# Patient Record
Sex: FEMALE | Race: BLACK OR AFRICAN AMERICAN | Employment: OTHER | URBAN - METROPOLITAN AREA
[De-identification: names, ages, dates, MRNs, and addresses within clinical notes are randomized per-mention and may not be internally consistent; named-entity substitution may affect disease eponyms.]

---

## 2017-02-02 ENCOUNTER — OFFICE VISIT (OUTPATIENT)
Dept: INTERNAL MEDICINE CLINIC | Age: 67
End: 2017-02-02

## 2017-02-02 VITALS
WEIGHT: 168 LBS | SYSTOLIC BLOOD PRESSURE: 115 MMHG | HEART RATE: 77 BPM | RESPIRATION RATE: 16 BRPM | BODY MASS INDEX: 28.68 KG/M2 | DIASTOLIC BLOOD PRESSURE: 76 MMHG | TEMPERATURE: 98.5 F | OXYGEN SATURATION: 97 % | HEIGHT: 64 IN

## 2017-02-02 DIAGNOSIS — I10 ESSENTIAL HYPERTENSION: ICD-10-CM

## 2017-02-02 DIAGNOSIS — Z00.00 MEDICARE ANNUAL WELLNESS VISIT, SUBSEQUENT: Primary | ICD-10-CM

## 2017-02-02 DIAGNOSIS — Z13.39 SCREENING FOR ALCOHOLISM: ICD-10-CM

## 2017-02-02 DIAGNOSIS — M76.31 ILIOTIBIAL BAND SYNDROME OF RIGHT SIDE: ICD-10-CM

## 2017-02-02 DIAGNOSIS — M79.604 PAIN OF RIGHT LOWER EXTREMITY: ICD-10-CM

## 2017-02-02 DIAGNOSIS — Z13.31 SCREENING FOR DEPRESSION: ICD-10-CM

## 2017-02-02 DIAGNOSIS — Z71.89 ADVANCED CARE PLANNING/COUNSELING DISCUSSION: ICD-10-CM

## 2017-02-02 DIAGNOSIS — R73.02 GLUCOSE INTOLERANCE (IMPAIRED GLUCOSE TOLERANCE): ICD-10-CM

## 2017-02-02 DIAGNOSIS — E78.00 PURE HYPERCHOLESTEROLEMIA: ICD-10-CM

## 2017-02-02 DIAGNOSIS — Z00.00 ROUTINE GENERAL MEDICAL EXAMINATION AT A HEALTH CARE FACILITY: ICD-10-CM

## 2017-02-02 NOTE — PROGRESS NOTES
Have you been to the ER or urgent care clinic since your last visit? No     Have you been hospitalized since your last visit? No     Have you been seen or consulted any other health care provider outside of [address] [place] [address] since your last visit (including pap smears, colonoscopy screening)?   No

## 2017-02-02 NOTE — PATIENT INSTRUCTIONS
Medicare Part B Preventive Services Guidelines/Limitations Date last completed and Frequency Due Date   Bone Mass Measurement  (age 72 & older, biennial) Requires diagnosis related to osteoporosis or estrogen deficiency. Biennial benefit unless patient has history of long-term glucocorticoid tx or baseline is needed because initial test was by other method Completed 11/2014    Recommended every 2 years As recommended by your PCP or Specialist     Cardiovascular Screening Blood Tests (every 5 years)  Total cholesterol, HDL, Triglycerides Order as a panel if possible Completed 8/2016    As recommended by your PCP As recommended by your PCP or Specialist   Colorectal Cancer Screening  -Fecal occult blood test (annual)  -Flexible sigmoidoscopy (5y)  -Screening colonoscopy (10y)  -Barium Enema Age 49-80; After age [de-identified] if history of abnormal results Completed 7/17/2015     Recommended follow-up in 3 years  As recommended by your PCP or Specialist     Counseling to Prevent Tobacco Use (up to 8 sessions per year)  - Counseling greater than 3 and up to 10 minutes  - Counseling greater than 10 minutes Patients must be asymptomatic of tobacco-related conditions to receive as preventive service N/A N/A   Diabetes Screening Tests (at least every 3 years, Medicare covers annually or at 6-month intervals for prediabetic patients)    Fasting blood sugar (FBS) or glucose tolerance test (GTT) Patient must be diagnosed with one of the following:  -Hypertension, Dyslipidemia, obesity, previous impaired FBS or GTT  Or any two of the following: overweight, FH of diabetes, age ? 72, history of gestational diabetes, birth of baby weighing more than 9 pounds Completed 8/2016    Recommended every 3 years for non-diabetics     As recommended by your PCP or Specialist     Glaucoma Screening (no USPSTF recommendation) Diabetes mellitus, family history, , age 48 or over,  American, age 72 or over Completed 4 years ago    Recommended annually Due now   Seasonal Influenza Vaccination (annually)  Recommended Annually Patient declined for 2016 flu season. TDAP Vaccination  Completed 7/2010    Recommended every 10 years As recommended by your PCP or Specialist   Zoster (Shingles) Vaccination Covered by Medicare Part D through the pharmacy- PCP provides prescription Never received    Recommended once over age 48  As recommended by your PCP or Specialist   Pneumococcal Vaccination (once after 72)  Pneumo 23- 1/2013  Recommended once over the age of 72    Prevnar 15-  Recommended once over the age of 72 Complete        You are due for a Prevnar 13 vaccine. You can get this at your local pharmacy with a prescription from your PCP. If you think you have already received this vaccine, please notify our office of the administration date. Screening Mammography (biennial age 54-69) Annually (age 36 or over) Completed 12/2016   As recommended by your PCP or Specialist     Screening Pap Tests and Pelvic Examination (up to age 79 and after 79 if unknown history or abnormal study last 8 years) Every 25 months except high risk As recommended by your PCP or Specialist   As recommended by your PCP or Specialist     Ultrasound Screening for Abdominal Aortic Aneurysm (AAA) (once) Patient must be referred through IPPE and not have had a screening for abdominal aortic aneurysm before under Medicare. Limited to patients who meet one of the following criteria:  - Men who are 73-68 years old and have smoked more than 100 cigarettes in their lifetime.  -Anyone with a FH of AAA  -Anyone recommended for screening by USPSTF Not indicated unless recommended by PCP   Not indicated unless recommended by PCP     Family Practice Management 2011    Please bring a copy of your completed advance medical directive to the office so it may be added to your medical record. Thank you.     If you have any questions or concerns please feel free to contact me at [phone]. It was a pleasure meeting you today and participating in your healthcare. Yong Murillo RN         Iliotibial Band Syndrome: Care Instructions  Your Care Instructions  Iliotibial band syndrome is pain and swelling of the iliotibial band (also called the IT band). This is a band of tissue that runs down the outside of your thigh. It connects the side of your hip to the side of your knee. It helps keep your knee and hip stable and in their normal position. When you have IT band syndrome, you may feel pain on the outside of your hip. It happens as your IT band snaps back and forth over the bony point of your hip. Sometimes you may only feel pain on the outside of your knee. You can get this syndrome if the IT band is too tight or if you do certain activities over and over that put pressure on your hip or knee. This is a common problem in runners, cyclists, and people who do other aerobic activities. IT band syndrome is treated with rest and medicines. These relieve swelling and pain. Physical therapy is also used. It may include stretching or doing certain exercises that can help strengthen your IT band and hip muscles. Sometimes a steroid shot is given to help relieve pain at the spot that is most sore. Follow-up care is a key part of your treatment and safety. Be sure to make and go to all appointments, and call your doctor if you are having problems. It's also a good idea to know your test results and keep a list of the medicines you take. How can you care for yourself at home? · Stay at a healthy weight. Being overweight puts extra strain on your hip and knee joints. · Take pain medicines exactly as directed. ¨ If the doctor gave you a prescription medicine for pain, take it as prescribed. ¨ If you are not taking a prescription pain medicine, ask your doctor if you can take an over-the-counter medicine.   · Talk to your doctor or physical therapist about exercises that will help ease hip and knee pain. ¨ Stretch before you exercise. This can help prevent stiffness and injury. You can try gentle forms of yoga to help keep your joints and muscles flexible. ¨ Use exercises that are less stressful on the joints. Walk instead of jog. Ride a stationary bike with little resistance. Or you can swim or try water exercise. ¨ Do exercises that can help strengthen your IT band and hip muscles. Your doctor or physical therapist can tell you what kind of exercises are best for you. He or she can help you learn the right way to do the exercises. When should you call for help? Watch closely for changes in your health, and be sure to contact your doctor if:  · You have pain in your hip or knee that doesn't go away. · You do not get better as expected. Where can you learn more? Go to http://john paul-cirilo.info/. Enter L449 in the search box to learn more about \"Iliotibial Band Syndrome: Care Instructions. \"  Current as of: May 23, 2016  Content Version: 11.1  © 8303-5765 [employer]. Care instructions adapted under license by [employer] (which disclaims liability or warranty for this information). If you have questions about a medical condition or this instruction, always ask your healthcare professional. [address] any warranty or liability for your use of this information. Iliotibial Band Syndrome: Exercises  Your Care Instructions  Here are some examples of typical rehabilitation exercises for your condition. Start each exercise slowly. Ease off the exercise if you start to have pain. Your doctor or physical therapist will tell you when you can start these exercises and which ones will work best for you. How to do the exercises  Iliotibial band stretch    1. Lean sideways against a wall. If you are not steady on your feet, hold on to a chair or counter. 2. Stand on the leg with the affected hip, with that leg close to the wall.  Then cross your other leg in front of it. 3. Let your affected hip drop out to the side of your body and against the wall. Then lean away from your affected hip until you feel a stretch. 4. Hold the stretch for 15 to 30 seconds. 5. Repeat 2 to 4 times. Piriformis stretch    1. Lie on your back with your legs straight. 2. Lift your affected leg and bend your knee. With your opposite hand, reach across your body, and then gently pull your knee toward your opposite shoulder. 3. Hold the stretch for 15 to 30 seconds. 4. Repeat 2 to 4 times. Hamstring wall stretch    1. Lie on your back in a doorway, with your good leg through the open door. 2. Slide your affected leg up the wall to straighten your knee. You should feel a gentle stretch down the back of your leg. ¨ Do not arch your back. ¨ Do not bend either knee. ¨ Keep one heel touching the floor and the other heel touching the wall. Do not point your toes. 3. Hold the stretch for at least 1 minute to begin. Then try to lengthen the time you hold the stretch to as long as 6 minutes. 4. Repeat 2 to 4 times. If you do not have a place to do this exercise in a doorway, there is another way to do it:  1. Lie on your back, and bend the knee of your affected leg. 2. Loop a towel under the ball and toes of that foot, and hold the ends of the towel in your hands. 3. Straighten your knee, and slowly pull back on the towel. You should feel a gentle stretch down the back of your leg. 4. Hold the stretch for 15 to 30 seconds. Or even better, hold the stretch for 1 minute if you can. 5. Repeat 2 to 4 times. Follow-up care is a key part of your treatment and safety. Be sure to make and go to all appointments, and call your doctor if you are having problems. It's also a good idea to know your test results and keep a list of the medicines you take. Where can you learn more? Go to http://john paul-cirilo.info/.   Enter P252 in the search box to learn more about \"Iliotibial Band Syndrome: Exercises. \"  Current as of: May 23, 2016  Content Version: 11.1  © 5768-6555 [employer]. Care instructions adapted under license by [employer] (which disclaims liability or warranty for this information). If you have questions about a medical condition or this instruction, always ask your healthcare professional. [address] any warranty or liability for your use of this information. Hamstring Strain: Rehab Exercises  Your Care Instructions  Here are some examples of typical rehabilitation exercises for your condition. Start each exercise slowly. Ease off the exercise if you start to have pain. Your doctor or physical therapist will tell you when you can start these exercises and which ones will work best for you. How to do the exercises  Hamstring set (heel dig)    6. Sit with your affected leg bent. Your good leg should be straight and supported on the floor. 7. Tighten the muscles on the back of your bent leg (hamstring) by pressing your heel into the floor. 8. Hold for about 6 seconds, and then rest for up to 10 seconds. 9. Repeat 8 to 12 times. Hamstring curl    5. Lie on your stomach with your knees straight. Place a pillow under your stomach. If your kneecap is uncomfortable, roll up a washcloth and put it under your leg just above your kneecap. 6. Lift the foot of your affected leg by bending your knee so that you bring your foot up toward your buttock. If this motion hurts, try it without bending your knee quite as far. This may help you avoid any painful motion. 7. Slowly move your leg up and down. 8. Repeat 8 to 12 times. When you can do this exercise with ease and no pain, add some resistance. To do this:  · Tie the ends of an exercise band together to form a loop. Attach one end of the loop to a secure object or shut a door on it to hold it in place.  (Or you can have someone hold one end of the loop to provide resistance.)  · Loop the other end of the exercise band around the lower part of your affected leg. · Repeat steps 1 through 4, slowly pulling back on the exercise band with your leg. Hip extension    6. Stand facing a wall with your hands on the wall at about chest level. 7. Keeping the knee of your affected leg straight, kick that leg straight back behind you. 8. Relax, and lower your leg back to the starting position. 9. Repeat 8 to 12 times. When you can do this exercise with ease and no pain, add some resistance. To do this:  · Tie the ends of an exercise band together to form a loop. Attach one end of the loop to a secure object or shut a door on it to hold it in place. (Or you can have someone hold one end of the loop to provide resistance.)  · Loop the other end of the exercise band around the lower part of your affected leg. · Repeat steps 1 through 4, slowly pulling back on the exercise band with your leg. Hamstring wall stretch    1. Lie on your back in a doorway, with your good leg through the open door. 2. Slide your affected leg up the wall to straighten your knee. You should feel a gentle stretch down the back of your leg. ¨ Do not arch your back. ¨ Do not bend either knee. ¨ Keep one heel touching the floor and the other heel touching the wall. Do not point your toes. 3. Hold the stretch for at least 1 minute to begin. Then try to lengthen the time you hold the stretch to as long as 6 minutes. 4. Repeat 2 to 4 times. If you do not have a place to do this exercise in a doorway, there is another way to do it:  1. Lie on your back, and bend the knee of your affected leg. 2. Loop a towel under the ball and toes of that foot, and hold the ends of the towel in your hands. 3. Straighten your knee, and slowly pull back on the towel. You should feel a gentle stretch down the back of your leg. 4. Hold the stretch for 15 to 30 seconds.  Or even better, hold the stretch for 1 minute if you can. 5. Repeat 2 to 4 times. Calf stretch    1. Stand facing a wall with your hands on the wall at about eye level. Put your affected leg about a step behind your other leg. 2. Keeping your back leg straight and your back heel on the floor, bend your front knee and gently bring your hip and chest toward the wall until you feel a stretch in the calf of your back leg. 3. Hold the stretch for 15 to 30 seconds. 4. Repeat 2 to 4 times. 5. Repeat steps 1 through 4, but this time keep your back knee bent. Single-leg balance    1. Stand on a flat surface with your arms stretched out to your sides like you are making the letter \"T. \" Then lift your good leg off the floor, bending it at the knee. If you are not steady on your feet, use one hand to hold on to a chair, counter, or wall. 2. Standing on your affected leg, keep that knee straight. Try to balance on that leg for up to 30 seconds. Then rest for up to 10 seconds. 3. Repeat 6 to 8 times. 4. When you can balance on your affected leg for 30 seconds with your eyes open, try to balance on it with your eyes closed. When you can do this exercise with your eyes closed for 30 seconds and with ease and no pain, try standing on a pillow or piece of foam, and repeat steps 1 through 4. Follow-up care is a key part of your treatment and safety. Be sure to make and go to all appointments, and call your doctor if you are having problems. It's also a good idea to know your test results and keep a list of the medicines you take. Where can you learn more? Go to http://john paul-cirilo.info/. Enter [id number] in the search box to learn more about \"Hamstring Strain: Rehab Exercises. \"  Current as of: May 23, 2016  Content Version: 11.1  © 7141-2873 [employer]. Care instructions adapted under license by [employer] (which disclaims liability or warranty for this information).  If you have questions about a medical condition or this instruction, always ask your healthcare professional. [place] [address] any warranty or liability for your use of this information. Hamstring Strain: Rehab Exercises  Your Care Instructions  Here are some examples of typical rehabilitation exercises for your condition. Start each exercise slowly. Ease off the exercise if you start to have pain. Your doctor or physical therapist will tell you when you can start these exercises and which ones will work best for you. How to do the exercises  Hamstring set (heel dig)    10. Sit with your affected leg bent. Your good leg should be straight and supported on the floor. 11. Tighten the muscles on the back of your bent leg (hamstring) by pressing your heel into the floor. 12. Hold for about 6 seconds, and then rest for up to 10 seconds. 13. Repeat 8 to 12 times. Hamstring curl    9. Lie on your stomach with your knees straight. Place a pillow under your stomach. If your kneecap is uncomfortable, roll up a washcloth and put it under your leg just above your kneecap. 10. Lift the foot of your affected leg by bending your knee so that you bring your foot up toward your buttock. If this motion hurts, try it without bending your knee quite as far. This may help you avoid any painful motion. 11. Slowly move your leg up and down. 12. Repeat 8 to 12 times. When you can do this exercise with ease and no pain, add some resistance. To do this:  · Tie the ends of an exercise band together to form a loop. Attach one end of the loop to a secure object or shut a door on it to hold it in place. (Or you can have someone hold one end of the loop to provide resistance.)  · Loop the other end of the exercise band around the lower part of your affected leg. · Repeat steps 1 through 4, slowly pulling back on the exercise band with your leg. Hip extension    10. Stand facing a wall with your hands on the wall at about chest level.   11. Keeping the knee of your affected leg straight, kick that leg straight back behind you. 12. Relax, and lower your leg back to the starting position. 13. Repeat 8 to 12 times. When you can do this exercise with ease and no pain, add some resistance. To do this:  · Tie the ends of an exercise band together to form a loop. Attach one end of the loop to a secure object or shut a door on it to hold it in place. (Or you can have someone hold one end of the loop to provide resistance.)  · Loop the other end of the exercise band around the lower part of your affected leg. · Repeat steps 1 through 4, slowly pulling back on the exercise band with your leg. Hamstring wall stretch    5. Lie on your back in a doorway, with your good leg through the open door. 6. Slide your affected leg up the wall to straighten your knee. You should feel a gentle stretch down the back of your leg. ¨ Do not arch your back. ¨ Do not bend either knee. ¨ Keep one heel touching the floor and the other heel touching the wall. Do not point your toes. 7. Hold the stretch for at least 1 minute to begin. Then try to lengthen the time you hold the stretch to as long as 6 minutes. 8. Repeat 2 to 4 times. If you do not have a place to do this exercise in a doorway, there is another way to do it:  6. Lie on your back, and bend the knee of your affected leg. 7. Loop a towel under the ball and toes of that foot, and hold the ends of the towel in your hands. 8. Straighten your knee, and slowly pull back on the towel. You should feel a gentle stretch down the back of your leg. 9. Hold the stretch for 15 to 30 seconds. Or even better, hold the stretch for 1 minute if you can. 10. Repeat 2 to 4 times. Calf stretch    6. Stand facing a wall with your hands on the wall at about eye level. Put your affected leg about a step behind your other leg.   7. Keeping your back leg straight and your back heel on the floor, bend your front knee and gently bring your hip and chest toward the wall until you feel a stretch in the calf of your back leg. 8. Hold the stretch for 15 to 30 seconds. 9. Repeat 2 to 4 times. 10. Repeat steps 1 through 4, but this time keep your back knee bent. Single-leg balance    5. Stand on a flat surface with your arms stretched out to your sides like you are making the letter \"T. \" Then lift your good leg off the floor, bending it at the knee. If you are not steady on your feet, use one hand to hold on to a chair, counter, or wall. 6. Standing on your affected leg, keep that knee straight. Try to balance on that leg for up to 30 seconds. Then rest for up to 10 seconds. 7. Repeat 6 to 8 times. 8. When you can balance on your affected leg for 30 seconds with your eyes open, try to balance on it with your eyes closed. When you can do this exercise with your eyes closed for 30 seconds and with ease and no pain, try standing on a pillow or piece of foam, and repeat steps 1 through 4. Follow-up care is a key part of your treatment and safety. Be sure to make and go to all appointments, and call your doctor if you are having problems. It's also a good idea to know your test results and keep a list of the medicines you take. Where can you learn more? Go to http://john paul-cirilo.info/. Enter [id number] in the search box to learn more about \"Hamstring Strain: Rehab Exercises. \"  Current as of: May 23, 2016  Content Version: 11.1  © 20062034-1903 [employer]. Care instructions adapted under license by [employer] (which disclaims liability or warranty for this information). If you have questions about a medical condition or this instruction, always ask your healthcare professional. [place] [address] any warranty or liability for your use of this information.        Hamstring Strain: Care Instructions  Your Care Instructions    A hamstring strain happens when you overstretch, or pull, the muscles that run down the back of your thigh. It can happen when you exercise or lift something or if you're injured in an accident. You may feel pain and tenderness that's worse when you move your injured leg. The back of your thigh may be swollen and bruised. If you have a bad strain, you may not be able to move your leg normally. While a minor strain often heals well with rest and other treatment, a severe strain may require medical treatment. If a severe strain isn't treated, you may have long-lasting problems. Follow-up care is a key part of your treatment and safety. Be sure to make and go to all appointments, and call your doctor if you are having problems. It's also a good idea to know your test results and keep a list of the medicines you take. How can you care for yourself at home? · Rest your injured leg. Don't put weight on it for a day or two. If your doctor advises you to, use crutches to rest the leg. · Put ice or a cold pack on the back of your thigh for 10 to 20 minutes at a time to stop swelling. Put a thin cloth between the ice and your skin. · Wrapping your thigh with an elastic bandage (such as an Ace wrap), will help decrease swelling. Don't wrap it too tightly, since this can cause more swelling below the affected area. · Elevate your thigh on pillows while applying ice and anytime you are sitting or lying down. · Ask your doctor if you can take an over-the-counter pain medicine, such as acetaminophen (Tylenol), ibuprofen (Advil, Motrin), or naproxen (Aleve). Be safe with medicines. Read and follow all instructions on the label. · Don't do anything that makes the pain worse. Return to your usual level of activity slowly. When should you call for help? Call your doctor now or seek immediate medical care if:  · You have severe or increasing pain. · You have tingling, weakness, or numbness in your injured leg. · You cannot move your injured leg.   Watch closely for changes in your health, and be sure to contact your doctor if:  · You do not get better as expected. Where can you learn more? Go to http://john paul-cirilo.info/. Enter V117 in the search box to learn more about \"Hamstring Strain: Care Instructions. \"  Current as of: May 23, 2016  Content Version: 11.1  © 0650-6129 [employer]. Care instructions adapted under license by [employer] (which disclaims liability or warranty for this information). If you have questions about a medical condition or this instruction, always ask your healthcare professional. [address] any warranty or liability for your use of this information. Hamstring Strain: Care Instructions  Your Care Instructions    A hamstring strain happens when you overstretch, or pull, the muscles that run down the back of your thigh. It can happen when you exercise or lift something or if you're injured in an accident. You may feel pain and tenderness that's worse when you move your injured leg. The back of your thigh may be swollen and bruised. If you have a bad strain, you may not be able to move your leg normally. While a minor strain often heals well with rest and other treatment, a severe strain may require medical treatment. If a severe strain isn't treated, you may have long-lasting problems. Follow-up care is a key part of your treatment and safety. Be sure to make and go to all appointments, and call your doctor if you are having problems. It's also a good idea to know your test results and keep a list of the medicines you take. How can you care for yourself at home? · Rest your injured leg. Don't put weight on it for a day or two. If your doctor advises you to, use crutches to rest the leg. · Put ice or a cold pack on the back of your thigh for 10 to 20 minutes at a time to stop swelling. Put a thin cloth between the ice and your skin.   · Wrapping your thigh with an elastic bandage (such as an Ace wrap), will help decrease swelling. Don't wrap it too tightly, since this can cause more swelling below the affected area. · Elevate your thigh on pillows while applying ice and anytime you are sitting or lying down. · Ask your doctor if you can take an over-the-counter pain medicine, such as acetaminophen (Tylenol), ibuprofen (Advil, Motrin), or naproxen (Aleve). Be safe with medicines. Read and follow all instructions on the label. · Don't do anything that makes the pain worse. Return to your usual level of activity slowly. When should you call for help? Call your doctor now or seek immediate medical care if:  · You have severe or increasing pain. · You have tingling, weakness, or numbness in your injured leg. · You cannot move your injured leg. Watch closely for changes in your health, and be sure to contact your doctor if:  · You do not get better as expected. Where can you learn more? Go to http://john paul-cirilo.info/. Enter O301 in the search box to learn more about \"Hamstring Strain: Care Instructions. \"  Current as of: May 23, 2016  Content Version: 11.1  © 6640-3819 [employer]. Care instructions adapted under license by [employer] (which disclaims liability or warranty for this information). If you have questions about a medical condition or this instruction, always ask your healthcare professional. [address] any warranty or liability for your use of this information.

## 2017-02-02 NOTE — MR AVS SNAPSHOT
Visit Information Date & Time Provider Department Dept. Phone Encounter #  
 2/2/2017  1:15 PM Concepcion Cordero MD [facility] [facility] [id number] Your Appointments 2/14/2017  9:45 AM  
ROUTINE CARE with Concepcion Cordero MD  
[facility] [address]) Appt Note: 6 mo fu/  
 [address] [facility] [id number]  
[phone]  
  
   
 [address] [facility] [address] Upcoming Health Maintenance Date Due Hepatitis C Screening 1950 GLAUCOMA SCREENING Q2Y 4/21/2013 Pneumococcal 65+ Low/Medium Risk (1 of 2 - PCV13) 1/1/2014 MEDICARE YEARLY EXAM 2/3/2018 COLONOSCOPY 7/17/2018 BREAST CANCER SCRN MAMMOGRAM 12/1/2018 DTaP/Tdap/Td series (2 - Td) 7/1/2020 Allergies as of 2/2/2017  Review Complete On: 2/2/2017 By: Concepcion Cordero MD  
 No Known Allergies Current Immunizations  Reviewed on 10/14/2016 Name Date Influenza Vaccine Whole 9/12/2011 Pneumococcal Polysaccharide (PPSV-23) 1/1/2013 TB Skin Test (PPD) 1/1/2012 TDAP Vaccine 7/1/2010 Not reviewed this visit You Were Diagnosed With   
  
 Codes Comments Essential hypertension    -  Primary ICD-10-CM: I10 
ICD-9-CM: 401.9 Pure hypercholesterolemia     ICD-10-CM: E78.00 ICD-9-CM: 272.0 Pain of right lower extremity     ICD-10-CM: M79.604 ICD-9-CM: 729.5 Iliotibial band syndrome of right side     ICD-10-CM: M76.31 
ICD-9-CM: 728.89 Glucose intolerance (impaired glucose tolerance)     ICD-10-CM: R73.02 
ICD-9-CM: 790.22 Vitals BP Pulse Temp Resp Height(growth percentile) Weight(growth percentile) 115/76 (BP 1 Location: Left arm, BP Patient Position: Sitting) 77 98.5 °F (36.9 °C) (Oral) 16 5' 4\" (1.626 m) 168 lb (76.2 kg) SpO2 BMI OB Status Smoking Status 97% 28.84 kg/m2 Hysterectomy Former Smoker BMI and BSA Data Body Mass Index Body Surface Area  
 28.84 kg/m 2 1.85 m 2 Preferred Pharmacy Pharmacy Name Phone [facility] [address] [phone] Your Updated Medication List  
  
   
This list is accurate as of: 17  2:00 PM.  Always use your most recent med list.  
  
  
  
  
 CENTRUM SILVER PO Take 1 Tab by mouth daily. ESTROVEN MOOD & MEMORY PO Take  by mouth as needed. GERITOL PO Take  by mouth daily. IBUPROFEN PO Take  by mouth as needed. meclizine 25 mg tablet Commonly known as:  ANTIVERT  
take 1 tablet by mouth three times a day if needed  
  
 pantoprazole 40 mg tablet Commonly known as:  PROTONIX Take 1 Tab by mouth daily. pneumococcal 13 aneesh conj dip 0.5 mL Syrg injection Commonly known as:  PREVNAR-13  
0.5 mL by IntraMUSCular route once for 1 dose. potassium 99 mg tablet Take 99 mg by mouth daily. promethazine 25 mg tablet Commonly known as:  PHENERGAN Take 25 mg by mouth every six (6) hours as needed for Nausea. simvastatin 20 mg tablet Commonly known as:  ZOCOR Take 1 Tab by mouth nightly. Do NOT take with verapamil. Take chol medication in the evening  
  
 triamterene-hydroCHLOROthiazide 37.5-25 mg per tablet Commonly known as:  Brooke Do Take 1 Tab by mouth daily. verapamil 40 mg tablet Commonly known as:  CALAN Take  by mouth two (2) times a day. Indications: FOR VERTIGO Prescriptions Printed Refills  
 pneumococcal 13 aneesh conj dip (PREVNAR-13) 0.5 mL syrg injection 0 Si.5 mL by IntraMUSCular route once for 1 dose. Class: Print Route: IntraMUSCular We Performed the Following CK [id number] CPT(R)] D DIMER [id number] CPT(R)] HEMOGLOBIN A1C WITH EAG [69898 CPT(R)] LIPID PANEL [98479 CPT(R)] METABOLIC PANEL, COMPREHENSIVE [68855 CPT(R)] REFERRAL TO OPHTHALMOLOGY [REF57 Custom] Comments:  
 Please evaluate patient for htnsive retinopthy. Referral Information Referral ID Referred By Referred To  
  
 [id number] [place] [facility] Not Available Visits Status Start Date End Date 1 New Request 2/2/17 2/2/18 If your referral has a status of pending review or denied, additional information will be sent to support the outcome of this decision. Patient Instructions Medicare Part B Preventive Services Guidelines/Limitations Date last completed and Frequency Due Date Bone Mass Measurement 
(age 72 & older, biennial) Requires diagnosis related to osteoporosis or estrogen deficiency. Biennial benefit unless patient has history of long-term glucocorticoid tx or baseline is needed because initial test was by other method Completed 11/2014 Recommended every 2 years As recommended by your PCP or Specialist 
  
Cardiovascular Screening Blood Tests (every 5 years) Total cholesterol, HDL, Triglycerides Order as a panel if possible Completed 8/2016 As recommended by your PCP As recommended by your PCP or Specialist  
Colorectal Cancer Screening 
-Fecal occult blood test (annual) -Flexible sigmoidoscopy (5y) 
-Screening colonoscopy (10y) -Barium Enema Age 49-80; After age [de-identified] if history of abnormal results Completed 7/17/2015 Recommended follow-up in 3 years  As recommended by your PCP or Specialist 
  
Counseling to Prevent Tobacco Use (up to 8 sessions per year) - Counseling greater than 3 and up to 10 minutes - Counseling greater than 10 minutes Patients must be asymptomatic of tobacco-related conditions to receive as preventive service N/A N/A Diabetes Screening Tests (at least every 3 years, Medicare covers annually or at 6-month intervals for prediabetic patients) Fasting blood sugar (FBS) or glucose tolerance test (GTT) Patient must be diagnosed with one of the following: 
-Hypertension, Dyslipidemia, obesity, previous impaired FBS or GTT Or any two of the following: overweight, FH of diabetes, age ? 72, history of gestational diabetes, birth of baby weighing more than 9 pounds Completed 8/2016 Recommended every 3 years for non-diabetics As recommended by your PCP or Specialist 
  
Glaucoma Screening (no USPSTF recommendation) Diabetes mellitus, family history, , age 48 or over,  American, age 72 or over Completed 4 years ago Recommended annually Due now Seasonal Influenza Vaccination (annually)  Recommended Annually Patient declined for 2016 flu season. TDAP Vaccination  Completed 7/2010 Recommended every 10 years As recommended by your PCP or Specialist  
Zoster (Shingles) Vaccination Covered by Medicare Part D through the pharmacy- PCP provides prescription Never received Recommended once over age 48  As recommended by your PCP or Specialist  
Pneumococcal Vaccination (once after 65)  Pneumo 23- 1/2013 Recommended once over the age of 72 Prevnar 13-  Recommended once over the age of 72 Complete You are due for a Prevnar 13 vaccine. You can get this at your local pharmacy with a prescription from your PCP. If you think you have already received this vaccine, please notify our office of the administration date. Screening Mammography (biennial age 54-69) Annually (age 36 or over) Completed 12/2016 As recommended by your PCP or Specialist 
  
Screening Pap Tests and Pelvic Examination (up to age 79 and after 79 if unknown history or abnormal study last 10 years) Every 24 months except high risk As recommended by your PCP or Specialist 
 As recommended by your PCP or Specialist 
  
Ultrasound Screening for Abdominal Aortic Aneurysm (AAA) (once) Patient must be referred through IPPE and not have had a screening for abdominal aortic aneurysm before under Medicare.   Limited to patients who meet one of the following criteria: 
 - Men who are 73-68 years old and have smoked more than 100 cigarettes in their lifetime. 
-Anyone with a FH of AAA 
-Anyone recommended for screening by USPSTF Not indicated unless recommended by PCP Not indicated unless recommended by PCP Family Practice Management 2011 Please bring a copy of your completed advance medical directive to the office so it may be added to your medical record. Thank you. If you have any questions or concerns please feel free to contact me at [phone]. It was a pleasure meeting you today and participating in your healthcare. Prerna Randle RN Iliotibial Band Syndrome: Care Instructions Your Care Instructions Iliotibial band syndrome is pain and swelling of the iliotibial band (also called the IT band). This is a band of tissue that runs down the outside of your thigh. It connects the side of your hip to the side of your knee. It helps keep your knee and hip stable and in their normal position. When you have IT band syndrome, you may feel pain on the outside of your hip. It happens as your IT band snaps back and forth over the bony point of your hip. Sometimes you may only feel pain on the outside of your knee. You can get this syndrome if the IT band is too tight or if you do certain activities over and over that put pressure on your hip or knee. This is a common problem in runners, cyclists, and people who do other aerobic activities. IT band syndrome is treated with rest and medicines. These relieve swelling and pain. Physical therapy is also used. It may include stretching or doing certain exercises that can help strengthen your IT band and hip muscles. Sometimes a steroid shot is given to help relieve pain at the spot that is most sore. Follow-up care is a key part of your treatment and safety. Be sure to make and go to all appointments, and call your doctor if you are having problems.  It's also a good idea to know your test results and keep a list of the medicines you take. How can you care for yourself at home? · Stay at a healthy weight. Being overweight puts extra strain on your hip and knee joints. · Take pain medicines exactly as directed. ¨ If the doctor gave you a prescription medicine for pain, take it as prescribed. ¨ If you are not taking a prescription pain medicine, ask your doctor if you can take an over-the-counter medicine. · Talk to your doctor or physical therapist about exercises that will help ease hip and knee pain. ¨ Stretch before you exercise. This can help prevent stiffness and injury. You can try gentle forms of yoga to help keep your joints and muscles flexible. ¨ Use exercises that are less stressful on the joints. Walk instead of jog. Ride a stationary bike with little resistance. Or you can swim or try water exercise. ¨ Do exercises that can help strengthen your IT band and hip muscles. Your doctor or physical therapist can tell you what kind of exercises are best for you. He or she can help you learn the right way to do the exercises. When should you call for help? Watch closely for changes in your health, and be sure to contact your doctor if: 
· You have pain in your hip or knee that doesn't go away. · You do not get better as expected. Where can you learn more? Go to http://john paul-cirilo.info/. Enter L449 in the search box to learn more about \"Iliotibial Band Syndrome: Care Instructions. \" Current as of: May 23, 2016 Content Version: 11.1 © 6173-5103 [employer]. Care instructions adapted under license by [employer] (which disclaims liability or warranty for this information). If you have questions about a medical condition or this instruction, always ask your healthcare professional. [place] [address] any warranty or liability for your use of this information. Iliotibial Band Syndrome: Exercises Your Care Instructions Here are some examples of typical rehabilitation exercises for your condition. Start each exercise slowly. Ease off the exercise if you start to have pain. Your doctor or physical therapist will tell you when you can start these exercises and which ones will work best for you. How to do the exercises Iliotibial band stretch 1. Lean sideways against a wall. If you are not steady on your feet, hold on to a chair or counter. 2. Stand on the leg with the affected hip, with that leg close to the wall. Then cross your other leg in front of it. 3. Let your affected hip drop out to the side of your body and against the wall. Then lean away from your affected hip until you feel a stretch. 4. Hold the stretch for 15 to 30 seconds. 5. Repeat 2 to 4 times. Piriformis stretch 1. Lie on your back with your legs straight. 2. Lift your affected leg and bend your knee. With your opposite hand, reach across your body, and then gently pull your knee toward your opposite shoulder. 3. Hold the stretch for 15 to 30 seconds. 4. Repeat 2 to 4 times. Hamstring wall stretch 1. Lie on your back in a doorway, with your good leg through the open door. 2. Slide your affected leg up the wall to straighten your knee. You should feel a gentle stretch down the back of your leg. ¨ Do not arch your back. ¨ Do not bend either knee. ¨ Keep one heel touching the floor and the other heel touching the wall. Do not point your toes. 3. Hold the stretch for at least 1 minute to begin. Then try to lengthen the time you hold the stretch to as long as 6 minutes. 4. Repeat 2 to 4 times. If you do not have a place to do this exercise in a doorway, there is another way to do it: 1. Lie on your back, and bend the knee of your affected leg. 2. Loop a towel under the ball and toes of that foot, and hold the ends of the towel in your hands. 3. Straighten your knee, and slowly pull back on the towel.  You should feel a gentle stretch down the back of your leg. 4. Hold the stretch for 15 to 30 seconds. Or even better, hold the stretch for 1 minute if you can. 5. Repeat 2 to 4 times. Follow-up care is a key part of your treatment and safety. Be sure to make and go to all appointments, and call your doctor if you are having problems. It's also a good idea to know your test results and keep a list of the medicines you take. Where can you learn more? Go to http://john paul-cirilo.info/. Enter P252 in the search box to learn more about \"Iliotibial Band Syndrome: Exercises. \" Current as of: May 23, 2016 Content Version: 11.1 © 1747-0705 [employer]. Care instructions adapted under license by [employer] (which disclaims liability or warranty for this information). If you have questions about a medical condition or this instruction, always ask your healthcare professional. [place] [address] any warranty or liability for your use of this information. Hamstring Strain: Rehab Exercises Your Care Instructions Here are some examples of typical rehabilitation exercises for your condition. Start each exercise slowly. Ease off the exercise if you start to have pain. Your doctor or physical therapist will tell you when you can start these exercises and which ones will work best for you. How to do the exercises Hamstring set (heel dig) 6. Sit with your affected leg bent. Your good leg should be straight and supported on the floor. 7. Tighten the muscles on the back of your bent leg (hamstring) by pressing your heel into the floor. 8. Hold for about 6 seconds, and then rest for up to 10 seconds. 9. Repeat 8 to 12 times. Hamstring curl 5. Lie on your stomach with your knees straight. Place a pillow under your stomach. If your kneecap is uncomfortable, roll up a washcloth and put it under your leg just above your kneecap. 6. Lift the foot of your affected leg by bending your knee so that you bring your foot up toward your buttock. If this motion hurts, try it without bending your knee quite as far. This may help you avoid any painful motion. 7. Slowly move your leg up and down. 8. Repeat 8 to 12 times. When you can do this exercise with ease and no pain, add some resistance. To do this: · Tie the ends of an exercise band together to form a loop. Attach one end of the loop to a secure object or shut a door on it to hold it in place. (Or you can have someone hold one end of the loop to provide resistance.) · Loop the other end of the exercise band around the lower part of your affected leg. · Repeat steps 1 through 4, slowly pulling back on the exercise band with your leg. Hip extension 6. Stand facing a wall with your hands on the wall at about chest level. 7. Keeping the knee of your affected leg straight, kick that leg straight back behind you. 8. Relax, and lower your leg back to the starting position. 9. Repeat 8 to 12 times. When you can do this exercise with ease and no pain, add some resistance. To do this: · Tie the ends of an exercise band together to form a loop. Attach one end of the loop to a secure object or shut a door on it to hold it in place. (Or you can have someone hold one end of the loop to provide resistance.) · Loop the other end of the exercise band around the lower part of your affected leg. · Repeat steps 1 through 4, slowly pulling back on the exercise band with your leg. Hamstring wall stretch 1. Lie on your back in a doorway, with your good leg through the open door. 2. Slide your affected leg up the wall to straighten your knee. You should feel a gentle stretch down the back of your leg. ¨ Do not arch your back. ¨ Do not bend either knee. ¨ Keep one heel touching the floor and the other heel touching the wall. Do not point your toes. 3. Hold the stretch for at least 1 minute to begin. Then try to lengthen the time you hold the stretch to as long as 6 minutes. 4. Repeat 2 to 4 times. If you do not have a place to do this exercise in a doorway, there is another way to do it: 1. Lie on your back, and bend the knee of your affected leg. 2. Loop a towel under the ball and toes of that foot, and hold the ends of the towel in your hands. 3. Straighten your knee, and slowly pull back on the towel. You should feel a gentle stretch down the back of your leg. 4. Hold the stretch for 15 to 30 seconds. Or even better, hold the stretch for 1 minute if you can. 5. Repeat 2 to 4 times. Calf stretch 1. Stand facing a wall with your hands on the wall at about eye level. Put your affected leg about a step behind your other leg. 2. Keeping your back leg straight and your back heel on the floor, bend your front knee and gently bring your hip and chest toward the wall until you feel a stretch in the calf of your back leg. 3. Hold the stretch for 15 to 30 seconds. 4. Repeat 2 to 4 times. 5. Repeat steps 1 through 4, but this time keep your back knee bent. Single-leg balance 1. Stand on a flat surface with your arms stretched out to your sides like you are making the letter \"T. \" Then lift your good leg off the floor, bending it at the knee. If you are not steady on your feet, use one hand to hold on to a chair, counter, or wall. 2. Standing on your affected leg, keep that knee straight. Try to balance on that leg for up to 30 seconds. Then rest for up to 10 seconds. 3. Repeat 6 to 8 times. 4. When you can balance on your affected leg for 30 seconds with your eyes open, try to balance on it with your eyes closed. When you can do this exercise with your eyes closed for 30 seconds and with ease and no pain, try standing on a pillow or piece of foam, and repeat steps 1 through 4. Follow-up care is a key part of your treatment and safety. Be sure to make and go to all appointments, and call your doctor if you are having problems. It's also a good idea to know your test results and keep a list of the medicines you take. Where can you learn more? Go to http://john paul-cirilo.info/. Enter [id number] in the search box to learn more about \"Hamstring Strain: Rehab Exercises. \" Current as of: May 23, 2016 Content Version: 11.1 © 0949-7001 [employer]. Care instructions adapted under license by [employer] (which disclaims liability or warranty for this information). If you have questions about a medical condition or this instruction, always ask your healthcare professional. [address] any warranty or liability for your use of this information. Hamstring Strain: Rehab Exercises Your Care Instructions Here are some examples of typical rehabilitation exercises for your condition. Start each exercise slowly. Ease off the exercise if you start to have pain. Your doctor or physical therapist will tell you when you can start these exercises and which ones will work best for you. How to do the exercises Hamstring set (heel dig) 10. Sit with your affected leg bent. Your good leg should be straight and supported on the floor. 11. Tighten the muscles on the back of your bent leg (hamstring) by pressing your heel into the floor. 12. Hold for about 6 seconds, and then rest for up to 10 seconds. 13. Repeat 8 to 12 times. Hamstring curl 9. Lie on your stomach with your knees straight. Place a pillow under your stomach. If your kneecap is uncomfortable, roll up a washcloth and put it under your leg just above your kneecap. 10. Lift the foot of your affected leg by bending your knee so that you bring your foot up toward your buttock. If this motion hurts, try it without bending your knee quite as far.  This may help you avoid any painful motion. 11. Slowly move your leg up and down. 12. Repeat 8 to 12 times. When you can do this exercise with ease and no pain, add some resistance. To do this: · Tie the ends of an exercise band together to form a loop. Attach one end of the loop to a secure object or shut a door on it to hold it in place. (Or you can have someone hold one end of the loop to provide resistance.) · Loop the other end of the exercise band around the lower part of your affected leg. · Repeat steps 1 through 4, slowly pulling back on the exercise band with your leg. Hip extension 10. Stand facing a wall with your hands on the wall at about chest level. 11. Keeping the knee of your affected leg straight, kick that leg straight back behind you. 12. Relax, and lower your leg back to the starting position. 13. Repeat 8 to 12 times. When you can do this exercise with ease and no pain, add some resistance. To do this: · Tie the ends of an exercise band together to form a loop. Attach one end of the loop to a secure object or shut a door on it to hold it in place. (Or you can have someone hold one end of the loop to provide resistance.) · Loop the other end of the exercise band around the lower part of your affected leg. · Repeat steps 1 through 4, slowly pulling back on the exercise band with your leg. Hamstring wall stretch 5. Lie on your back in a doorway, with your good leg through the open door. 6. Slide your affected leg up the wall to straighten your knee. You should feel a gentle stretch down the back of your leg. ¨ Do not arch your back. ¨ Do not bend either knee. ¨ Keep one heel touching the floor and the other heel touching the wall. Do not point your toes. 7. Hold the stretch for at least 1 minute to begin. Then try to lengthen the time you hold the stretch to as long as 6 minutes. 8. Repeat 2 to 4 times. If you do not have a place to do this exercise in a doorway, there is another way to do it: 6. Lie on your back, and bend the knee of your affected leg. 7. Loop a towel under the ball and toes of that foot, and hold the ends of the towel in your hands. 8. Straighten your knee, and slowly pull back on the towel. You should feel a gentle stretch down the back of your leg. 9. Hold the stretch for 15 to 30 seconds. Or even better, hold the stretch for 1 minute if you can. 10. Repeat 2 to 4 times. Calf stretch 6. Stand facing a wall with your hands on the wall at about eye level. Put your affected leg about a step behind your other leg. 7. Keeping your back leg straight and your back heel on the floor, bend your front knee and gently bring your hip and chest toward the wall until you feel a stretch in the calf of your back leg. 8. Hold the stretch for 15 to 30 seconds. 9. Repeat 2 to 4 times. 10. Repeat steps 1 through 4, but this time keep your back knee bent. Single-leg balance 5. Stand on a flat surface with your arms stretched out to your sides like you are making the letter \"T. \" Then lift your good leg off the floor, bending it at the knee. If you are not steady on your feet, use one hand to hold on to a chair, counter, or wall. 6. Standing on your affected leg, keep that knee straight. Try to balance on that leg for up to 30 seconds. Then rest for up to 10 seconds. 7. Repeat 6 to 8 times. 8. When you can balance on your affected leg for 30 seconds with your eyes open, try to balance on it with your eyes closed. When you can do this exercise with your eyes closed for 30 seconds and with ease and no pain, try standing on a pillow or piece of foam, and repeat steps 1 through 4. Follow-up care is a key part of your treatment and safety. Be sure to make and go to all appointments, and call your doctor if you are having problems. It's also a good idea to know your test results and keep a list of the medicines you take. Where can you learn more? Go to http://john paul-cirilo.info/. Enter [id number] in the search box to learn more about \"Hamstring Strain: Rehab Exercises. \" Current as of: May 23, 2016 Content Version: 11.1 © 0583-8156 "[employer] Part of [employer]". Care instructions adapted under license by [employer] (which disclaims liability or warranty for this information). If you have questions about a medical condition or this instruction, always ask your healthcare professional. [place] [address] any warranty or liability for your use of this information. Hamstring Strain: Care Instructions Your Care Instructions A hamstring strain happens when you overstretch, or pull, the muscles that run down the back of your thigh. It can happen when you exercise or lift something or if you're injured in an accident. You may feel pain and tenderness that's worse when you move your injured leg. The back of your thigh may be swollen and bruised. If you have a bad strain, you may not be able to move your leg normally. While a minor strain often heals well with rest and other treatment, a severe strain may require medical treatment. If a severe strain isn't treated, you may have long-lasting problems. Follow-up care is a key part of your treatment and safety. Be sure to make and go to all appointments, and call your doctor if you are having problems. It's also a good idea to know your test results and keep a list of the medicines you take. How can you care for yourself at home? · Rest your injured leg. Don't put weight on it for a day or two. If your doctor advises you to, use crutches to rest the leg. · Put ice or a cold pack on the back of your thigh for 10 to 20 minutes at a time to stop swelling. Put a thin cloth between the ice and your skin. · Wrapping your thigh with an elastic bandage (such as an Ace wrap), will help decrease swelling.  Don't wrap it too tightly, since this can cause more swelling below the affected area. · Elevate your thigh on pillows while applying ice and anytime you are sitting or lying down. · Ask your doctor if you can take an over-the-counter pain medicine, such as acetaminophen (Tylenol), ibuprofen (Advil, Motrin), or naproxen (Aleve). Be safe with medicines. Read and follow all instructions on the label. · Don't do anything that makes the pain worse. Return to your usual level of activity slowly. When should you call for help? Call your doctor now or seek immediate medical care if: 
· You have severe or increasing pain. · You have tingling, weakness, or numbness in your injured leg. · You cannot move your injured leg. Watch closely for changes in your health, and be sure to contact your doctor if: 
· You do not get better as expected. Where can you learn more? Go to http://john paul-cirilo.info/. Enter H055 in the search box to learn more about \"Hamstring Strain: Care Instructions. \" Current as of: May 23, 2016 Content Version: 11.1 © 3106-8459 [employer]. Care instructions adapted under license by [employer] (which disclaims liability or warranty for this information). If you have questions about a medical condition or this instruction, always ask your healthcare professional. [address] any warranty or liability for your use of this information. Hamstring Strain: Care Instructions Your Care Instructions A hamstring strain happens when you overstretch, or pull, the muscles that run down the back of your thigh. It can happen when you exercise or lift something or if you're injured in an accident. You may feel pain and tenderness that's worse when you move your injured leg. The back of your thigh may be swollen and bruised. If you have a bad strain, you may not be able to move your leg normally.  
While a minor strain often heals well with rest and other treatment, a severe strain may require medical treatment. If a severe strain isn't treated, you may have long-lasting problems. Follow-up care is a key part of your treatment and safety. Be sure to make and go to all appointments, and call your doctor if you are having problems. It's also a good idea to know your test results and keep a list of the medicines you take. How can you care for yourself at home? · Rest your injured leg. Don't put weight on it for a day or two. If your doctor advises you to, use crutches to rest the leg. · Put ice or a cold pack on the back of your thigh for 10 to 20 minutes at a time to stop swelling. Put a thin cloth between the ice and your skin. · Wrapping your thigh with an elastic bandage (such as an Ace wrap), will help decrease swelling. Don't wrap it too tightly, since this can cause more swelling below the affected area. · Elevate your thigh on pillows while applying ice and anytime you are sitting or lying down. · Ask your doctor if you can take an over-the-counter pain medicine, such as acetaminophen (Tylenol), ibuprofen (Advil, Motrin), or naproxen (Aleve). Be safe with medicines. Read and follow all instructions on the label. · Don't do anything that makes the pain worse. Return to your usual level of activity slowly. When should you call for help? Call your doctor now or seek immediate medical care if: 
· You have severe or increasing pain. · You have tingling, weakness, or numbness in your injured leg. · You cannot move your injured leg. Watch closely for changes in your health, and be sure to contact your doctor if: 
· You do not get better as expected. Where can you learn more? Go to http://john paul-cirilo.info/. Enter T337 in the search box to learn more about \"Hamstring Strain: Care Instructions. \" Current as of: May 23, 2016 Content Version: 11.1 © 9340-5861 [employer].  Care instructions adapted under license by [address] (which disclaims liability or warranty for this information). If you have questions about a medical condition or this instruction, always ask your healthcare professional. [address] any warranty or liability for your use of this information. Introducing [facility]! Dear Palma Dunlap: Thank you for requesting a Otogami account. Our records indicate that you already have an active Otogami account. You can access your account anytime at [email or web address] Did you know that you can access your hospital and ER discharge instructions at any time in Otogami? You can also review all of your test results from your hospital stay or ER visit. Additional Information If you have questions, please visit the Frequently Asked Questions section of the Otogami website at [email or web address] Remember, Otogami is NOT to be used for urgent needs. For medical emergencies, dial 911. Now available from your iPhone and Android! Please provide this summary of care documentation to your next provider. Your primary care clinician is listed as Yolanda Plata. If you have any questions after today's visit, please call [phone].

## 2017-02-02 NOTE — PROGRESS NOTES
Chief Complaint   Patient presents with    Leg Swelling     and right knee  weak and painful       chronic   x 1 year    Annual Wellness Visit       Please see JORDY Quan for medicare portion of visit. Pt did not have any additional questions for me and Whitney infomration reviewed with pt. Leg pain  Pt reports right leg pain for 1 year. She has been dealing with it and even continued to go to exercise classes. 7-8/10 pain in side of hip down to knee. She has 5/10 pain on side of right leg and behind leg. She notes she is constantly on the go and exercising vigorously. Subjective:   Stormy Logan is a 74 y.o. female with hypertension. Hypertension ROS: taking medications as instructed, no medication side effects noted, no TIA's, no chest pain on exertion, no dyspnea on exertion, no swelling of ankles. New concerns: none, she is exercisng with a class      Cholesterol  She is no longer taking simvastatin due to elevated cpk but did take 1 dose due to eating lots of ice cream  No cp or sob  Eating a heart healthy diet    Glucose intolerance  Labs and diet reviewed with pt    Past Medical History   Diagnosis Date    Arthritis      IN HIPS    Brain tumor ([facility] [address]      Pt being monitored by Dr Dorinda Diaz Environmental allergies     GERD (gastroesophageal reflux disease)     High cholesterol     Hypertension     Migraine      \"AFFECTS EYES\"; EPISODES START WITH EAR FULLNESS SENSATION    PUD (peptic ulcer disease)     S/P chemotherapy, time since greater than 12 weeks     Uterine cancer ([facility] [address] 1996      was seen by Dr. 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     Hx hysterectomy       WITH BSO    Hx  section  [address] Hx heent       shunt placed behind left ear to help with vertigo Social History     Social History    Marital status:      Spouse name: N/A    Number of children: N/A    Years of education: N/A     Social History Main Topics    Smoking status: Former Smoker     Packs/day: 1.00     Years: 30.00     Types: Cigarettes     Quit date: 1/1/1994    Smokeless tobacco: Never Used    Alcohol use No    Drug use: No    Sexual activity: Not Asked      Comment: no     Other Topics Concern    None     Social History Narrative    Retired at 69 yo but had to stop working due to vertigo        Grandson temporarily living with her        2 daughters both healthy     Family History   Problem Relation Age of Onset    Cancer Mother      colon    Stroke Mother     Heart Disease Mother     Diabetes Father     Cancer Father      kidney    Diabetes Sister     Elevated Lipids Sister     Heart Disease Sister     Hypertension Sister     Breast Cancer Sister     Heart Disease Brother     Breast Cancer Other     Breast Cancer Other     Anesth Problems Neg Hx      Current Outpatient Prescriptions   Medication Sig Dispense Refill    triamterene-hydrochlorothiazide (MAXZIDE) 37.5-25 mg per tablet Take 1 Tab by mouth daily. 90 Tab 1    simvastatin (ZOCOR) 20 mg tablet Take 1 Tab by mouth nightly. Do NOT take with verapamil. Take chol medication in the evening 90 Tab 0    pantoprazole (PROTONIX) 40 mg tablet Take 1 Tab by mouth daily. 90 Tab 0    FOLIC ACID/MULTIVIT-MIN/LUTEIN (CENTRUM SILVER PO) Take 1 Tab by mouth daily.  verapamil (CALAN) 40 mg tablet Take  by mouth two (2) times a day. Indications: FOR VERTIGO      IRON/VITAMIN B COMPLEX (GERITOL PO) Take  by mouth daily.  potassium 99 mg tablet Take 99 mg by mouth daily.  IBUPROFEN PO Take  by mouth as needed.  promethazine (PHENERGAN) 25 mg tablet Take 25 mg by mouth every six (6) hours as needed for Nausea.  MV,CA,MIN/FA/HERBAL COMP #223 (ESTROVEN MOOD & MEMORY PO) Take  by mouth as needed.  meclizine (ANTIVERT) 25 mg tablet take 1 tablet by mouth three times a day if needed 90 Tab 0     No Known Allergies    Review of Systems - General ROS: negative for - chills, fatigue, fever, hot flashes, malaise or night sweats  Cardiovascular ROS: no chest pain or dyspnea on exertion  Respiratory ROS: no cough, shortness of breath, or wheezing    Visit Vitals    /76 (BP 1 Location: Left arm, BP Patient Position: Sitting)    Pulse 77    Temp 98.5 °F (36.9 °C) (Oral)    Resp 16    Ht 5' 4\" (1.626 m)    Wt 168 lb (76.2 kg)    SpO2 97%    BMI 28.84 kg/m2     General Appearance:  Well developed, well nourished,alert and oriented x 3, and individual in no acute distress. Ears/Nose/Mouth/Throat:   Hearing grossly normal.         Neck: Supple, no lad, no bruits   Chest:   Lungs clear to auscultation bilaterally. Cardiovascular:  Regular rate and rhythm, S1, S2 normal, no murmur. Abdomen:   Soft, non-tender, bowel sounds are active. Extremities: No edema bilaterally. Pain on palpation of IT band and posterior knee and hamstring area, neg straight leg raise   Skin: Warm and dry, no suspicious lesions                 Melissa Grant was seen today for leg swelling and annual wellness visit.     Diagnoses and all orders for this visit:    Essential hypertension  Cont meds  Cont exercisie  -     REFERRAL TO OPHTHALMOLOGY  -     METABOLIC PANEL, COMPREHENSIVE    Pure hypercholesterolemia  Cont off statin  Recheck ck  Cont exercise  -     REFERRAL TO OPHTHALMOLOGY  -     METABOLIC PANEL, COMPREHENSIVE  -     LIPID PANEL  -     CK    Pain of right lower extremity  Concern for dvt but pt very active may be hamstring issues  -     D DIMER    Iliotibial band syndrome of right side  Exercises given to pt and discussed on avs with pt    Glucose intolerance (impaired glucose tolerance)  Lab checked with pt  -     HEMOGLOBIN A1C WITH EAG  -     METABOLIC PANEL, COMPREHENSIVE    Other orders  -     pneumococcal 13 aneesh conj dip (PREVNAR-13) 0.5 mL syrg injection; 0.5 mL by IntraMUSCular route once for 1 dose. This note will not be viewable in [address]         Follow up in 6 monhts or earlier if sx not improving

## 2017-02-02 NOTE — PROGRESS NOTES
Nurse Navigator Medicare Wellness Visit performed by RICHARD Sevilla RN    This is a Subsequent Danita Visit providing Personalized Prevention Plan Services (PPPS) (Performed 12 months after initial AWV and PPPS )    I have reviewed the patient's medical history in detail and updated the computerized patient record. History     Past Medical History   Diagnosis Date    Arthritis      IN HIPS    Brain tumor ([facility] [address]      Pt being monitored by Dr Holli Madrigal Environmental allergies     GERD (gastroesophageal reflux disease)     High cholesterol     Hypertension     Migraine      \"AFFECTS EYES\"; EPISODES START WITH EAR FULLNESS SENSATION    PUD (peptic ulcer disease)     S/P chemotherapy, time since greater than 12 weeks     Uterine cancer ([facility] [address]       was seen by Dr. 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  Hx hysterectomy       WITH BSO    Hx  section  ,     Hx heent       shunt placed behind left ear to help with vertigo     Current Outpatient Prescriptions   Medication Sig Dispense Refill    pneumococcal 13 aneesh conj dip (PREVNAR-13) 0.5 mL syrg injection 0.5 mL by IntraMUSCular route once for 1 dose. 0.5 mL 0    triamterene-hydrochlorothiazide (MAXZIDE) 37.5-25 mg per tablet Take 1 Tab by mouth daily. 90 Tab 1    simvastatin (ZOCOR) 20 mg tablet Take 1 Tab by mouth nightly. Do NOT take with verapamil. Take chol medication in the evening 90 Tab 0    pantoprazole (PROTONIX) 40 mg tablet Take 1 Tab by mouth daily. 90 Tab 0    FOLIC ACID/MULTIVIT-MIN/LUTEIN (CENTRUM SILVER PO) Take 1 Tab by mouth daily.  verapamil (CALAN) 40 mg tablet Take  by mouth two (2) times a day. Indications: FOR VERTIGO      IRON/VITAMIN B COMPLEX (GERITOL PO) Take  by mouth daily.       potassium 99 mg tablet Take 99 mg by mouth daily.  IBUPROFEN PO Take  by mouth as needed.  promethazine (PHENERGAN) 25 mg tablet Take 25 mg by mouth every six (6) hours as needed for Nausea.  MV,CA,MIN/FA/HERBAL COMP #223 (ESTROVEN MOOD & MEMORY PO) Take  by mouth as needed.  meclizine (ANTIVERT) 25 mg tablet take 1 tablet by mouth three times a day if needed 90 Tab 0     No Known Allergies  Family History   Problem Relation Age of Onset    Cancer Mother      colon    Stroke Mother     Heart Disease Mother     Diabetes Father     Cancer Father      kidney    Diabetes Sister     Elevated Lipids Sister     Heart Disease Sister     Hypertension Sister     Breast Cancer Sister     Heart Disease Brother     Breast Cancer Other     Breast Cancer Other     Anesth Problems Neg Hx      Social History   Substance Use Topics    Smoking status: Former Smoker     Packs/day: 1.00     Years: 30.00     Types: Cigarettes     Quit date: 1/1/1994    Smokeless tobacco: Never Used    Alcohol use No     Patient Active Problem List   Diagnosis Code    HTN (hypertension) I10    Hyperlipemia E78.5    S/P WILFRID-BSO Z90.710, Z90.722, Z90.79    Gastric ulcer, unspecified as acute or chronic, without mention of hemorrhage, perforation, or obstruction K25.9    Vertigo or labyrinthine disorder H81.90    Pap smear for cervical cancer screening Z12.4    Hx of screening mammography Z92.89    Screening for colon cancer Z12.11    Hypertension I10    Migraine G43.909    GERD (gastroesophageal reflux disease) K21.9    Advanced care planning/counseling discussion Z71.89       Depression Risk Factor Screening:   Patient denies feelings of being down, depressed or hopeless at this time. Patient states that they have a strong support system within their family & friends.    PHQ 2 / 9, over the last two weeks 2/2/2017   Little interest or pleasure in doing things Not at all   Feeling down, depressed or hopeless Not at all   Total Score PHQ 2 0     Alcohol Risk Factor Screening: On any occasion during the past 3 months, have you had more than 3 drinks containing alcohol? No    Do you average more than 7 drinks per week? No      Functional Ability and Level of Safety:     Hearing Loss   normal-to-mild    Activities of Daily Living   Self-care. Patient states that she lives in a private residence. Patient states independence in all ADLs & denies the use of assistive devices for ambulation. NN encouraged patient to continue and/ or introduce routine physical exercise into their daily routine as applicable & as recommended by PCP. Patient verbalized understanding & agreement to take this into consideration; however, patient states that she has been experiencing right leg/ hip/ knee pain & weakness which intermittently limits her physical activity. Patient will discuss this in more detail with PCP today. Requires assistance with:   ADL Assessment 2/2/2017   Feeding yourself No Help Needed   Getting from bed to chair No Help Needed   Getting dressed No Help Needed   Bathing or showering No Help Needed   Walk across the room (includes cane/walker) No Help Needed   Using the telphone No Help Needed   Taking your medications No Help Needed   Preparing meals No Help Needed   Managing money (expenses/bills) No Help Needed   Moderately strenuous housework (laundry) No Help Needed   Shopping for personal items (toiletries/medicines) No Help Needed   Shopping for groceries No Help Needed   Driving No Help Needed   Climbing a flight of stairs No Help Needed   Getting to places beyond walking distances No Help Needed       Fall Risk   Patient denies falls within the past year & verbalizes awareness of fall prevention strategies. Fall Risk Assessment, last 12 mths 2/2/2017   Able to walk? Yes   Fall in past 12 months?  No     Abuse Screen   Patient is not abused    Review of Systems   Medicare Wellness Visit    Physical Examination Evaluation of Cognitive Function:  Mood/affect:  happy  Appearance: age appropriate and casually dressed  Family member/caregiver input: None present; however, patient reports a strong support system. No exam performed today, Medicare Wellness Visit. Patient Care Team:  Christina Mosley MD as PCP - General (Internal Medicine)  Tim Gomez MD (Gastroenterology)    Advice/Referrals/Counseling   Education and counseling provided:  End-of-Life planning (with patient's consent)  Pneumococcal Vaccine  Influenza Vaccine  Screening Mammography  Screening Pap and pelvic (covered once every 2 years)  Colorectal cancer screening tests  Bone mass measurement (DEXA)  Screening for glaucoma  tdap & shingles vaccinations    Assessment/Plan   1. Patient states conversation about AMD has been started, but nothing written down yet. NN encouraged patient to complete AMD paperwork & bring a copy to the office for scanning into the medical record. Patient verbalized understanding & agreement. 2. Patient is up to date on the following immunizations: tdap vaccine (admin 7/2010), pnuemonia 23 vaccine (admin 1/2013). Patient confirmed the aforementioned preventative immunization dates are correct. Patient declined 2016 flu vaccine. PCP notified. Patient denies receiving a shingles vaccine in the past & denies ever having a case of shingles in the past. Patient is due for Prevnar 13. Patient is aware that Prevnar 13 can be given at their local pharmacy with a prescription from their PCP. Patient verbalized understanding. PCP notified. Today, PCP provided patient with a Prevnar 13 vaccination prescription. Patient's health maintenance immunization record has been updated & is current.      3. Patient states that she follows the PCP's recommendations for the following screenings: Mammography (report on file from 12/2016), pap/ pelvic, DEXA scan (report on file from 11/2014) & colonoscopy (report on file from 7/17/2015 performed by Dr. Navarro Loaiza at [place] [address] with recommended routine screening follow-up in 3 years, 2018). Patient confirmed the aforementioned preventative screening dates. 4. Patient wears corrective lenses. Patient admits that she has not been for a routine eye exam in 4 years. Patient adds that at the visit 4 years ago, she did not receive a glaucoma screening due to experiencing vertigo symptoms. NN informed patient of the benefits to annual, routine eye exams & glaucoma screenings. Patient verbalized understanding & awareness of her need to complete. Today, PCP provided patient with a referral to an ophthalmologist.    Patient verbalized understanding of all information discussed. Patient was given the opportunity to ask questions. Medication reconciliation completed by MA/ LPN and reviewed by PCP. Patient provided AVS which includes Medicare Wellness Preventative Screening Table.

## 2017-02-03 LAB
ALBUMIN SERPL-MCNC: 4.3 G/DL (ref 3.6–4.8)
ALBUMIN/GLOB SERPL: 1.3 {RATIO} (ref 1.1–2.5)
ALP SERPL-CCNC: 58 IU/L (ref 39–117)
ALT SERPL-CCNC: 19 IU/L (ref 0–32)
AST SERPL-CCNC: 15 IU/L (ref 0–40)
BILIRUB SERPL-MCNC: 0.3 MG/DL (ref 0–1.2)
BUN SERPL-MCNC: 18 MG/DL (ref 8–27)
BUN/CREAT SERPL: 19 (ref 11–26)
CALCIUM SERPL-MCNC: 9.7 MG/DL (ref 8.7–10.3)
CHLORIDE SERPL-SCNC: 104 MMOL/L (ref 96–106)
CHOLEST SERPL-MCNC: 208 MG/DL (ref 100–199)
CK SERPL-CCNC: 211 U/L (ref 24–173)
CO2 SERPL-SCNC: 22 MMOL/L (ref 18–29)
CREAT SERPL-MCNC: 0.97 MG/DL (ref 0.57–1)
D DIMER PPP FEU-MCNC: 0.95 MG/L FEU (ref 0–0.49)
EST. AVERAGE GLUCOSE BLD GHB EST-MCNC: 137 MG/DL
GLOBULIN SER CALC-MCNC: 3.2 G/DL (ref 1.5–4.5)
GLUCOSE SERPL-MCNC: 80 MG/DL (ref 65–99)
HBA1C MFR BLD: 6.4 % (ref 4.8–5.6)
HDLC SERPL-MCNC: 56 MG/DL
INTERPRETATION, 910389: NORMAL
LDLC SERPL CALC-MCNC: 121 MG/DL (ref 0–99)
POTASSIUM SERPL-SCNC: 4 MMOL/L (ref 3.5–5.2)
PROT SERPL-MCNC: 7.5 G/DL (ref 6–8.5)
SODIUM SERPL-SCNC: 144 MMOL/L (ref 134–144)
TRIGL SERPL-MCNC: 153 MG/DL (ref 0–149)
VLDLC SERPL CALC-MCNC: 31 MG/DL (ref 5–40)

## 2017-02-05 DIAGNOSIS — M79.604 RIGHT LEG PAIN: Primary | ICD-10-CM

## 2017-02-05 NOTE — PROGRESS NOTES
Please contact pt and give her information to set up her doppler us of her right leg to ensure no clot. Please mychart if she can not do.

## 2017-02-06 ENCOUNTER — HOSPITAL ENCOUNTER (OUTPATIENT)
Dept: ULTRASOUND IMAGING | Age: 67
Discharge: HOME | End: 2017-02-06
Attending: INTERNAL MEDICINE
Payer: MEDICARE

## 2017-02-06 DIAGNOSIS — M79.604 RIGHT LEG PAIN: ICD-10-CM

## 2017-02-06 PROCEDURE — 93971 EXTREMITY STUDY: CPT

## 2017-02-13 ENCOUNTER — TELEPHONE (OUTPATIENT)
Dept: INTERNAL MEDICINE CLINIC | Age: 67
End: 2017-02-13

## 2017-02-13 NOTE — TELEPHONE ENCOUNTER
----- Message from Mingo Han sent at 2/11/2017  3:28 PM EST -----  Regarding: Dr. Dave Lim/Telephone  Contact: [phone]  Patient requesting Rx for right leg muscle pain.  Best contact number is [phone]

## 2017-03-16 RX ORDER — TRIAMTERENE/HYDROCHLOROTHIAZID 37.5-25 MG
1 TABLET ORAL DAILY
Qty: 90 TAB | Refills: 1 | Status: SHIPPED | OUTPATIENT
Start: 2017-03-16 | End: 2017-09-20 | Stop reason: SDUPTHER

## 2017-04-10 ENCOUNTER — TELEPHONE (OUTPATIENT)
Dept: INTERNAL MEDICINE CLINIC | Age: 67
End: 2017-04-10

## 2017-04-10 DIAGNOSIS — M25.561 ACUTE PAIN OF RIGHT KNEE: Primary | ICD-10-CM

## 2017-04-10 NOTE — TELEPHONE ENCOUNTER
Referral has been Obtained & Faxed To Dr Marcell Feldman #  [id number]  No # Visits 99  Dates Covered  04/10/2017 - 07/08/2017

## 2017-04-10 NOTE — TELEPHONE ENCOUNTER
Referral request    Dr Nicole Saha  APT 4/25/2017 @  8:50 AM  Dx Right Knee Pain M25.561  p [phone]  f [phone]    [employer] [id number]  [address]

## 2017-06-29 ENCOUNTER — OFFICE VISIT (OUTPATIENT)
Dept: INTERNAL MEDICINE CLINIC | Age: 67
End: 2017-06-29

## 2017-06-29 VITALS
DIASTOLIC BLOOD PRESSURE: 75 MMHG | OXYGEN SATURATION: 98 % | WEIGHT: 161.12 LBS | RESPIRATION RATE: 18 BRPM | HEIGHT: 64 IN | TEMPERATURE: 98.2 F | BODY MASS INDEX: 27.51 KG/M2 | HEART RATE: 78 BPM | SYSTOLIC BLOOD PRESSURE: 110 MMHG

## 2017-06-29 DIAGNOSIS — I10 ESSENTIAL HYPERTENSION: ICD-10-CM

## 2017-06-29 DIAGNOSIS — R73.02 GLUCOSE INTOLERANCE (IMPAIRED GLUCOSE TOLERANCE): Primary | ICD-10-CM

## 2017-06-29 DIAGNOSIS — R10.13 EPIGASTRIC PAIN: ICD-10-CM

## 2017-06-29 DIAGNOSIS — E78.2 MIXED HYPERLIPIDEMIA: ICD-10-CM

## 2017-06-29 DIAGNOSIS — R73.02 GLUCOSE INTOLERANCE (IMPAIRED GLUCOSE TOLERANCE): ICD-10-CM

## 2017-06-29 NOTE — MR AVS SNAPSHOT
Visit Information Date & Time Provider Department Dept. Phone Encounter #  
 6/29/2017 11:00 AM Riley Cervantes MD [facility] [address] [id number] Upcoming Health Maintenance Date Due Hepatitis C Screening 1950 GLAUCOMA SCREENING Q2Y 4/21/2013 Pneumococcal 65+ Low/Medium Risk (1 of 2 - PCV13) 1/1/2014 INFLUENZA AGE 9 TO ADULT 8/1/2017 MEDICARE YEARLY EXAM 2/3/2018 COLONOSCOPY 7/17/2018 BREAST CANCER SCRN MAMMOGRAM 12/1/2018 DTaP/Tdap/Td series (2 - Td) 7/1/2020 Allergies as of 6/29/2017  Review Complete On: 6/29/2017 By: Riley Cervantes MD  
 No Known Allergies Current Immunizations  Reviewed on 10/14/2016 Name Date Influenza Vaccine Whole 9/12/2011 Pneumococcal Polysaccharide (PPSV-23) 1/1/2013 TB Skin Test (PPD) 1/1/2012 TDAP Vaccine 7/1/2010 Not reviewed this visit You Were Diagnosed With   
  
 Codes Comments Glucose intolerance (impaired glucose tolerance)    -  Primary ICD-10-CM: R73.02 
ICD-9-CM: 790.22 Essential hypertension     ICD-10-CM: I10 
ICD-9-CM: 401.9 Mixed hyperlipidemia     ICD-10-CM: E78.2 ICD-9-CM: 272.2 Epigastric pain     ICD-10-CM: R10.13 ICD-9-CM: 789.06 Vitals BP Pulse Temp Resp Height(growth percentile) Weight(growth percentile) 110/75 78 98.2 °F (36.8 °C) (Oral) 18 5' 4\" (1.626 m) 161 lb 1.9 oz (73.1 kg) SpO2 BMI OB Status Smoking Status 98% 27.66 kg/m2 Hysterectomy Former Smoker Vitals History BMI and BSA Data Body Mass Index Body Surface Area  
 27.66 kg/m 2 1.82 m 2 Preferred Pharmacy Pharmacy Name Phone [facility] [address] [phone] Your Updated Medication List  
  
   
This list is accurate as of: 6/29/17 12:24 PM.  Always use your most recent med list.  
  
  
  
  
 CENTRUM SILVER PO Take 1 Tab by mouth daily. ESTROVEN MOOD & MEMORY PO Take  by mouth as needed. GERITOL PO Take  by mouth daily. IBUPROFEN PO Take  by mouth as needed. meclizine 25 mg tablet Commonly known as:  ANTIVERT  
take 1 tablet by mouth three times a day if needed  
  
 pantoprazole 40 mg tablet Commonly known as:  PROTONIX Take 1 Tab by mouth daily. potassium 99 mg tablet Take 99 mg by mouth daily. promethazine 25 mg tablet Commonly known as:  PHENERGAN Take 25 mg by mouth every six (6) hours as needed for Nausea. simvastatin 20 mg tablet Commonly known as:  ZOCOR Take 1 Tab by mouth nightly. Do NOT take with verapamil. Take chol medication in the evening  
  
 triamterene-hydroCHLOROthiazide 37.5-25 mg per tablet Commonly known as:  Goldberg Rota Take 1 Tab by mouth daily. verapamil 40 mg tablet Commonly known as:  CALAN Take  by mouth two (2) times a day. Indications: FOR VERTIGO To-Do List   
 06/29/2017 Lab:  CBC W/O DIFF   
  
 06/29/2017 Lab:  HEMOGLOBIN A1C WITH EAG   
  
 06/29/2017 Lab:  LIPID PANEL   
  
 06/29/2017 Lab:  METABOLIC PANEL, COMPREHENSIVE [facility]! Dear Aimee Dies: Thank you for requesting a [employer] account. Our records indicate that you already have an active [employer] account. You can access your account anytime at [email or web address] Did you know that you can access your hospital and ER discharge instructions at any time in Green[employer]? You can also review all of your test results from your hospital stay or ER visit. Additional Information If you have questions, please visit the Frequently Asked Questions section of the Green[employer] website at [email or web address] Remember, [employer] is NOT to be used for urgent needs. For medical emergencies, dial 911. Now available from your iPhone and Android! Please provide this summary of care documentation to your next provider. Your primary care clinician is listed as Lillian Boykin. If you have any questions after today's visit, please call [phone].

## 2017-06-29 NOTE — PROGRESS NOTES
Chief Complaint   Patient presents with    Hip Injury     F/U from Hip Replacement Surgery         Leg pain  She is s/p hip surgery and doing well. She does have fatigue and some cold intolerance. Her gait is good. She is doing PT with rehab    Subjective:   Aguila Arriola is a 69 y.o. female with hypertension. Hypertension ROS: taking medications as instructed, no medication side effects noted, no TIA's, no chest pain on exertion, no dyspnea on exertion, no swelling of ankles. New concerns: none, she is losing weight      Cholesterol  She is no longer taking simvastatin due to elevated cpk but did take 1 dose due to eating lots of ice cream  No cp or sob  Eating a heart healthy diet    Glucose intolerance  Labs and diet reviewed with pt    gerd  She has lost 10 pounds since surgery  She notes she initially has appetite but when eats her appetite goes away and has burning in her upper abdomen    Past Medical History:   Diagnosis Date    Arthritis     IN HIPS    Brain tumor ([facility] [address]     Pt being monitored by Dr Raul Dyer Environmental allergies     GERD (gastroesophageal reflux disease)     High cholesterol     Hypertension     Migraine     \"AFFECTS EYES\"; EPISODES START WITH EAR FULLNESS SENSATION    PUD (peptic ulcer disease)     S/P chemotherapy, time since greater than 12 weeks 1996    Uterine cancer ([facility] [address] 1996     was seen by Dr. 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    Jazmyn HX HEENT  2012    shunt placed behind left ear to help with vertigo    HX HYSTERECTOMY  1996    WITH BSO     Social History     Social History    Marital status:      Spouse name: N/A    Number of children: N/A    Years of education: N/A     Social History Main Topics    Smoking status: Former Smoker     Packs/day: 1.00     Years: 30.00     Types: Cigarettes     Quit date: 1/1/1994    Smokeless tobacco: Never Used    Alcohol use No    Drug use: No    Sexual activity: Not on file      Comment: no     Other Topics Concern    Not on file     Social History Narrative    Retired at 69 yo but had to stop working due to vertigo        Grandson temporarily living with her        2 daughters both healthy     Family History   Problem Relation Age of Onset    Cancer Mother      colon    Stroke Mother     Heart Disease Mother     Diabetes Father     Cancer Father      kidney    Diabetes Sister     Elevated Lipids Sister     Heart Disease Sister     Hypertension Sister     Breast Cancer Sister     Heart Disease Brother     Breast Cancer Other     Breast Cancer Other     Anesth Problems Neg Hx      Current Outpatient Prescriptions   Medication Sig Dispense Refill    triamterene-hydroCHLOROthiazide (MAXZIDE) 37.5-25 mg per tablet Take 1 Tab by mouth daily. 90 Tab 1    pantoprazole (PROTONIX) 40 mg tablet Take 1 Tab by mouth daily. 90 Tab 0    FOLIC ACID/MULTIVIT-MIN/LUTEIN (CENTRUM SILVER PO) Take 1 Tab by mouth daily.  verapamil (CALAN) 40 mg tablet Take  by mouth two (2) times a day. Indications: FOR VERTIGO      IRON/VITAMIN B COMPLEX (GERITOL PO) Take  by mouth daily.  potassium 99 mg tablet Take 99 mg by mouth daily.  IBUPROFEN PO Take  by mouth as needed.  simvastatin (ZOCOR) 20 mg tablet Take 1 Tab by mouth nightly. Do NOT take with verapamil. Take chol medication in the evening 90 Tab 0    promethazine (PHENERGAN) 25 mg tablet Take 25 mg by mouth every six (6) hours as needed for Nausea.  MV,CA,MIN/FA/HERBAL COMP #223 (ESTROVEN MOOD & MEMORY PO) Take  by mouth as needed.       meclizine (ANTIVERT) 25 mg tablet take 1 tablet by mouth three times a day if needed 90 Tab 0     No Known Allergies    Review of Systems - General ROS: negative for - chills, fatigue, fever, hot flashes, malaise or night sweats  Cardiovascular ROS: no chest pain or dyspnea on exertion  Respiratory ROS: no cough, shortness of breath, or wheezing    Visit Vitals    /75    Pulse 78    Temp 98.2 °F (36.8 °C) (Oral)    Resp 18    Ht 5' 4\" (1.626 m)    Wt 161 lb 1.9 oz (73.1 kg)    SpO2 98%    BMI 27.66 kg/m2     General Appearance:  Well developed, well nourished,alert and oriented x 3, and individual in no acute distress. Ears/Nose/Mouth/Throat:   Hearing grossly normal.         Neck: Supple, no lad, no bruits   Chest:   Lungs clear to auscultation bilaterally. Cardiovascular:  Regular rate and rhythm, S1, S2 normal, no murmur. Abdomen:   Soft, epigastric pain on palpation, bowel sounds are active. Extremities: No edema bilaterally. Pain on palpation of IT band and posterior knee and hamstring area, neg straight leg raise   Skin: Warm and dry, no suspicious lesions                 Nimo Castro was seen today for hip injury. Diagnoses and all orders for this visit:    Glucose intolerance (impaired glucose tolerance)  + weight loss  Anticipate improvement   -     HEMOGLOBIN A1C WITH EAG; Future    Essential hypertension  Cont meds  Monitor for hypotension with weight loss, pt is awre  -     METABOLIC PANEL, COMPREHENSIVE; Future    Mixed hyperlipidemia  -     HEMOGLOBIN A1C WITH EAG; Future  -     LIPID PANEL; Future    Epigastric pain  Will check labs to ensure no anemia  Will restart 1 month of protonix as she may have gastritis  -     CBC W/O DIFF; Future        Follow up in 3 monhts or earlier if sx not improving    Monitor weight loss and assess in NON intentional    This note will not be viewable in MyChart.

## 2017-07-16 LAB
ALBUMIN SERPL-MCNC: 4.2 G/DL (ref 3.6–4.8)
ALBUMIN/GLOB SERPL: 1.3 {RATIO} (ref 1.2–2.2)
ALP SERPL-CCNC: 72 IU/L (ref 39–117)
ALT SERPL-CCNC: 14 IU/L (ref 0–32)
AST SERPL-CCNC: 18 IU/L (ref 0–40)
BILIRUB SERPL-MCNC: 0.4 MG/DL (ref 0–1.2)
BUN SERPL-MCNC: 16 MG/DL (ref 8–27)
BUN/CREAT SERPL: 15 (ref 12–28)
CALCIUM SERPL-MCNC: 10.1 MG/DL (ref 8.7–10.3)
CHLORIDE SERPL-SCNC: 100 MMOL/L (ref 96–106)
CHOLEST SERPL-MCNC: 217 MG/DL (ref 100–199)
CO2 SERPL-SCNC: 23 MMOL/L (ref 18–29)
CREAT SERPL-MCNC: 1.1 MG/DL (ref 0.57–1)
ERYTHROCYTE [DISTWIDTH] IN BLOOD BY AUTOMATED COUNT: 16.5 % (ref 12.3–15.4)
EST. AVERAGE GLUCOSE BLD GHB EST-MCNC: 123 MG/DL
GLOBULIN SER CALC-MCNC: 3.3 G/DL (ref 1.5–4.5)
GLUCOSE SERPL-MCNC: 96 MG/DL (ref 65–99)
HBA1C MFR BLD: 5.9 % (ref 4.8–5.6)
HCT VFR BLD AUTO: 34.2 % (ref 34–46.6)
HDLC SERPL-MCNC: 58 MG/DL
HGB BLD-MCNC: 10.9 G/DL (ref 11.1–15.9)
INTERPRETATION, 910389: NORMAL
INTERPRETATION: NORMAL
LDLC SERPL CALC-MCNC: 139 MG/DL (ref 0–99)
MCH RBC QN AUTO: 24.8 PG (ref 26.6–33)
MCHC RBC AUTO-ENTMCNC: 31.9 G/DL (ref 31.5–35.7)
MCV RBC AUTO: 78 FL (ref 79–97)
PDF IMAGE, 910387: NORMAL
PLATELET # BLD AUTO: 295 X10E3/UL (ref 150–379)
POTASSIUM SERPL-SCNC: 3.8 MMOL/L (ref 3.5–5.2)
PROT SERPL-MCNC: 7.5 G/DL (ref 6–8.5)
RBC # BLD AUTO: 4.39 X10E6/UL (ref 3.77–5.28)
SODIUM SERPL-SCNC: 140 MMOL/L (ref 134–144)
TRIGL SERPL-MCNC: 98 MG/DL (ref 0–149)
VLDLC SERPL CALC-MCNC: 20 MG/DL (ref 5–40)
WBC # BLD AUTO: 5.5 X10E3/UL (ref 3.4–10.8)

## 2017-08-25 ENCOUNTER — TELEPHONE (OUTPATIENT)
Dept: INTERNAL MEDICINE CLINIC | Age: 67
End: 2017-08-25

## 2017-12-08 ENCOUNTER — TELEPHONE (OUTPATIENT)
Dept: INTERNAL MEDICINE CLINIC | Age: 67
End: 2017-12-08

## 2017-12-08 DIAGNOSIS — H81.4 VERTIGO OF CENTRAL ORIGIN, UNSPECIFIED LATERALITY: ICD-10-CM

## 2017-12-08 DIAGNOSIS — R42 VERTIGO: Primary | ICD-10-CM

## 2017-12-08 NOTE — TELEPHONE ENCOUNTER
Ref to:    Dr Roxie Domingo. [facility]  At [address], [facility], [address]    Patient states she sees him for her vertigo   Apt : 12/13/2017  Fax [phone]        [employer] (MEDICARE REPLACEMENT/ADVANTAGE - HMO)             Pt ID : [id number] MASSEY H 3 G M

## 2017-12-12 NOTE — TELEPHONE ENCOUNTER
Referral has been Obtained & Faxed To Dr Jeni Goltz (F) [phone] #  [id number]  No # Visits 99  Dates Covered  12/12/2017 - 03/11/2018

## 2018-01-05 ENCOUNTER — HOSPITAL ENCOUNTER (OUTPATIENT)
Dept: MRI IMAGING | Age: 68
Discharge: HOME | End: 2018-01-05
Attending: SPECIALIST
Payer: MEDICARE

## 2018-01-05 DIAGNOSIS — G43.009 MIGRAINE WITHOUT AURA, NOT INTRACTABLE, WITHOUT STATUS MIGRAINOSUS: ICD-10-CM

## 2018-01-05 DIAGNOSIS — D32.0 BENIGN NEOPLASM OF CEREBRAL MENINGES (HCC): ICD-10-CM

## 2018-01-05 DIAGNOSIS — H90.3 SENSORY HEARING LOSS, BILATERAL: ICD-10-CM

## 2018-01-05 LAB — CREAT BLD-MCNC: 0.9 MG/DL (ref 0.6–1.3)

## 2018-01-05 PROCEDURE — 74011250636 HC RX REV CODE- 250/636: Performed by: SPECIALIST

## 2018-01-05 PROCEDURE — A9576 INJ PROHANCE MULTIPACK: HCPCS | Performed by: SPECIALIST

## 2018-01-05 PROCEDURE — 70553 MRI BRAIN STEM W/O & W/DYE: CPT

## 2018-01-05 PROCEDURE — 82565 ASSAY OF CREATININE: CPT

## 2018-01-05 RX ADMIN — GADOTERIDOL 15 ML: 279.3 INJECTION, SOLUTION INTRAVENOUS at 09:34

## 2018-01-15 ENCOUNTER — HOSPITAL ENCOUNTER (OUTPATIENT)
Dept: MAMMOGRAPHY | Age: 68
Discharge: HOME | End: 2018-01-15
Attending: INTERNAL MEDICINE
Payer: MEDICARE

## 2018-01-15 DIAGNOSIS — Z12.31 VISIT FOR SCREENING MAMMOGRAM: ICD-10-CM

## 2018-01-15 PROCEDURE — 77067 SCR MAMMO BI INCL CAD: CPT

## 2018-02-12 ENCOUNTER — TELEPHONE (OUTPATIENT)
Dept: OBGYN CLINIC | Age: 68
End: 2018-02-12

## 2018-02-20 ENCOUNTER — OFFICE VISIT (OUTPATIENT)
Dept: INTERNAL MEDICINE CLINIC | Age: 68
End: 2018-02-20

## 2018-02-20 VITALS
RESPIRATION RATE: 16 BRPM | BODY MASS INDEX: 28.34 KG/M2 | HEIGHT: 64 IN | WEIGHT: 166 LBS | SYSTOLIC BLOOD PRESSURE: 122 MMHG | OXYGEN SATURATION: 98 % | HEART RATE: 75 BPM | DIASTOLIC BLOOD PRESSURE: 85 MMHG | TEMPERATURE: 97.2 F

## 2018-02-20 DIAGNOSIS — Z23 IMMUNIZATION DUE: ICD-10-CM

## 2018-02-20 DIAGNOSIS — R53.83 FATIGUE, UNSPECIFIED TYPE: ICD-10-CM

## 2018-02-20 DIAGNOSIS — Z12.31 ENCOUNTER FOR SCREENING MAMMOGRAM FOR MALIGNANT NEOPLASM OF BREAST: ICD-10-CM

## 2018-02-20 DIAGNOSIS — Z00.00 MEDICARE ANNUAL WELLNESS VISIT, SUBSEQUENT: Primary | ICD-10-CM

## 2018-02-20 DIAGNOSIS — M89.9 DISORDER OF BONE AND CARTILAGE: ICD-10-CM

## 2018-02-20 DIAGNOSIS — D50.9 IRON DEFICIENCY ANEMIA, UNSPECIFIED IRON DEFICIENCY ANEMIA TYPE: ICD-10-CM

## 2018-02-20 DIAGNOSIS — Z12.11 SCREEN FOR COLON CANCER: ICD-10-CM

## 2018-02-20 DIAGNOSIS — Z13.31 SCREENING FOR DEPRESSION: ICD-10-CM

## 2018-02-20 DIAGNOSIS — Z13.39 SCREENING FOR ALCOHOLISM: ICD-10-CM

## 2018-02-20 DIAGNOSIS — E55.9 VITAMIN D DEFICIENCY: ICD-10-CM

## 2018-02-20 DIAGNOSIS — E78.2 MIXED HYPERLIPIDEMIA: ICD-10-CM

## 2018-02-20 DIAGNOSIS — Z78.0 POST-MENOPAUSE: ICD-10-CM

## 2018-02-20 DIAGNOSIS — Z11.59 ENCOUNTER FOR HEPATITIS C SCREENING TEST FOR LOW RISK PATIENT: ICD-10-CM

## 2018-02-20 DIAGNOSIS — M94.9 DISORDER OF BONE AND CARTILAGE: ICD-10-CM

## 2018-02-20 DIAGNOSIS — R73.02 GLUCOSE INTOLERANCE (IMPAIRED GLUCOSE TOLERANCE): ICD-10-CM

## 2018-02-20 NOTE — PROGRESS NOTES
Pt presents for her medicare wellness visit. She is also here to follow up on her anemia and htn. This part of visit will be in latter half of note    This is a Subsequent Medicare Annual Wellness Exam (AWV) (Performed 12 months after IPPE or effective date of Medicare Part B enrollment)    I have reviewed the patient's medical history in detail and updated the computerized patient record. History     Past Medical History:   Diagnosis Date    Arthritis     IN HIPS    Brain tumor ([facility] [address]     Pt being monitored by Dr Halle Mason Environmental allergies     GERD (gastroesophageal reflux disease)     High cholesterol     Hypertension     Migraine     \"AFFECTS EYES\"; EPISODES START WITH EAR FULLNESS SENSATION    PUD (peptic ulcer disease)     S/P chemotherapy, time since greater than 12 weeks 1996    Uterine cancer ([facility] [address] 1996     was seen by Dr. Hollie Cuevas Vertigo or labyrinthine disorder 10/31/2011    ENT shunt placed on left side behind ear      Past Surgical History:   Procedure Laterality Date    ENDOSCOPY, COLON, DIAGNOSTIC  3/12/2010    (Gelrud)-f/u 5 yrs. Jazmyn HX HEENT  2012    shunt placed behind left ear to help with vertigo    HX HYSTERECTOMY  1996    WITH BSO     Current Outpatient Prescriptions   Medication Sig Dispense Refill    pneumococcal 13 aneesh conj dip (PREVNAR 13, PF,) 0.5 mL syrg injection 0.5 mL by IntraMUSCular route once for 1 dose. 0.5 mL 0    pneumococcal 23-aneesh ps vaccine (PNEUMOVAX 23) 25 mcg/0.5 mL injection 0.5 mL by IntraMUSCular route once for 1 dose. 0.5 mL 0    triamterene-hydroCHLOROthiazide (MAXZIDE) 37.5-25 mg per tablet take 1 tablet by mouth once daily 90 Tab 1    pantoprazole (PROTONIX) 40 mg tablet Take 1 Tab by mouth daily. 90 Tab 0    FOLIC ACID/MULTIVIT-MIN/LUTEIN (CENTRUM SILVER PO) Take 1 Tab by mouth daily.  verapamil (CALAN) 40 mg tablet Take  by mouth two (2) times a day.  Indications: FOR VERTIGO      IRON/VITAMIN B COMPLEX (GERITOL PO) Take  by mouth daily.  potassium 99 mg tablet Take 99 mg by mouth daily.  IBUPROFEN PO Take  by mouth as needed.  simvastatin (ZOCOR) 20 mg tablet Take 1 Tab by mouth nightly. Do NOT take with verapamil. Take chol medication in the evening 90 Tab 0    promethazine (PHENERGAN) 25 mg tablet Take 25 mg by mouth every six (6) hours as needed for Nausea.  MV,CA,MIN/FA/HERBAL COMP #223 (ESTROVEN MOOD & MEMORY PO) Take  by mouth as needed.       meclizine (ANTIVERT) 25 mg tablet take 1 tablet by mouth three times a day if needed 90 Tab 0     No Known Allergies  Family History   Problem Relation Age of Onset    Cancer Mother      colon   [facility] Stroke Mother     Heart Disease Mother     Diabetes Father     Cancer Father      kidney    Diabetes Sister     Elevated Lipids Sister     Heart Disease Sister     Hypertension Sister     Breast Cancer Sister 61    Heart Disease Brother     Breast Cancer Other     Breast Cancer Other     Anesth Problems Neg Hx      Social History   Substance Use Topics    Smoking status: Former Smoker     Packs/day: 1.00     Years: 30.00     Types: Cigarettes     Quit date: 1/1/1994    Smokeless tobacco: Never Used    Alcohol use No     Patient Active Problem List   Diagnosis Code    HTN (hypertension) I10    Hyperlipemia E78.5    S/P WILFRID-BSO Z90.710, Z90.722, Z90.79    Gastric ulcer, unspecified as acute or chronic, without mention of hemorrhage, perforation, or obstruction K25.9    Vertigo or labyrinthine disorder H81.90    Pap smear for cervical cancer screening Z12.4    Hx of screening mammography Z92.89    Screening for colon cancer Z12.11    Hypertension I10    Migraine G43.909    GERD (gastroesophageal reflux disease) K21.9    Advanced care planning/counseling discussion Z71.89       Depression Risk Factor Screening:     PHQ over the last two weeks 2/20/2018   Little interest or pleasure in doing things Not at all Feeling down, depressed or hopeless Not at all   Total Score PHQ 2 0     Alcohol Risk Factor Screening: You do not drink alcohol or very rarely. Functional Ability and Level of Safety:   Hearing Loss  Hearing is good. Activities of Daily Living  The home contains: no safety equipment. Patient does total self care    Fall Risk  Fall Risk Assessment, last 12 mths 2/20/2018   Able to walk? Yes   Fall in past 12 months? No       Abuse Screen  Patient is not abused    Cognitive Screening   Evaluation of Cognitive Function:  Has your family/caregiver stated any concerns about your memory: no  Normal    Patient Care Team   Patient Care Team:  Ajay Davila MD as PCP - General (Internal Medicine)  Eric Jc MD (Gastroenterology)    Assessment/Plan   Education and counseling provided:  Are appropriate based on today's review and evaluation  End-of-Life planning (with patient's consent) pt does not have advance directives. Discussed with pt and will give her booklet and form and she will return to me. Pneumococcal Vaccine prv 13 rx written  Influenza Vaccine received 2017  Screening Mammography ordered should get annually  Screening Pap and pelvic (covered once every 2 years) no sx, WILFRID BSO no vaginal bleeding, pelvic pain or bloating  Colorectal cancer screening tests ordered screening  Cardiovascular screening blood test 10/2017 wnl  Bone mass measurement (DEXA) ordered  Diabetes screening  ttet ordered    Diagnoses and all orders for this visit:    1. Immunization due  -     pneumococcal 13 aneesh conj dip (PREVNAR 13, PF,) 0.5 mL syrg injection; 0.5 mL by IntraMUSCular route once for 1 dose. -     pneumococcal 23-aneesh ps vaccine (PNEUMOVAX 23) 25 mcg/0.5 mL injection; 0.5 mL by IntraMUSCular route once for 1 dose. 2. Encounter for hepatitis C screening test for low risk patient  -     HEPATITIS C AB    3. Post-menopause  -     Dexa Bone Density Study Axial ([id number]); Future    4.  Medicare annual wellness visit, subsequent    5. Screening for alcoholism    6. Screening for depression    7. Screen for colon cancer  -     OCCULT BLOOD, IMMUNOASSAY (FIT) (53595); Future  -     Referral for Colonoscopy (options for GI, Colon &  Rectal Surgery, & General Surgery)    8. Encounter for screening mammogram for malignant neoplasm of breast  -     Bilateral Digital Screening Mammography; Future    9. Disorder of bone and cartilage        Health Maintenance Due   Topic Date Due    Hepatitis C Screening  1950    GLAUCOMA SCREENING Q2Y  04/21/2013    Pneumococcal 65+ Low/Medium Risk (1 of 2 - PCV13) 01/01/2014    Influenza Age 9 to Adult  08/01/2017    MEDICARE YEARLY EXAM  02/03/2018    COLONOSCOPY  07/17/2018     Chief Complaint   Patient presents with    Annual Wellness Visit     Please see [address] first part of note. 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 She is taking an otc every other day with some vit c. She has some fatigue. 6/10. She denies cold intolerance. cholsterol  Eating heart healthy, Not eating fast food  Exercise at home twice a week does video will increase to 3-4 /week  She is no longer taking simvastatin due to elevated cpk but did take 1 dose due to eating lots of ice cream in her past but no se      Leg pain  She is s/p right hip surgery and doing well. She was anemic after surgery still taking iron every other day. Subjective:   Latrice Miranda is a 69 y.o. female with hypertension. Hypertension ROS: taking medications as instructed, no medication side effects noted, no TIA's, no chest pain on exertion, no dyspnea on exertion, no swelling of ankles.    New concerns: none      Glucose intolerance  Labs and diet reviewed with pt    Past Medical History:   Diagnosis Date    Arthritis     IN HIPS    Brain tumor ([facility]     Pt being monitored by Dr Tristen Roth, meningioma    Environmental allergies     GERD (gastroesophageal reflux disease)     High cholesterol     Hypertension     Migraine     \"AFFECTS EYES\"; EPISODES START WITH EAR FULLNESS SENSATION    S/P chemotherapy, time since greater than 12 weeks 1996    [facility]     was seen by Dr. 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    Jazmyn HX HEENT  2012    shunt placed behind left ear to help with vertigo    HX HYSTERECTOMY  1996    WITH BSO     Social History     Social History    Marital status:      Spouse name: N/A    Number of children: N/A    Years of education: N/A     Social History Main Topics    Smoking status: Former Smoker     Packs/day: 0.50     Years: 20.00     Types: Cigarettes     Quit date: 1/1/1994    Smokeless tobacco: Never Used      Comment: not anymore    Alcohol use No    Drug use: No    Sexual activity: Not Asked      Comment: no     Other Topics Concern    None     Social History Narrative    Retired at 71 yo but had to stop working due to vertigo    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        Grandson, 25, great nephew,21,  temporarily living with her        2 daughters both healthy     Family History   Problem Relation Age of Onset    Cancer Mother      colon    Stroke Mother     Heart Disease Mother     Diabetes Father     Cancer Father      kidney    Diabetes Sister     Elevated Lipids Sister     Heart Disease Sister     Hypertension Sister     Breast Cancer Sister 61    Heart Disease Brother     Breast Cancer Other     Breast Cancer Other     Anesth Problems Neg Hx      Current Outpatient Prescriptions   Medication Sig Dispense Refill    pneumococcal 13 aneesh conj dip (PREVNAR 13, PF,) 0.5 mL syrg injection 0.5 mL by IntraMUSCular route once for 1 dose. 0.5 mL 0    pneumococcal 23-aneesh ps vaccine (PNEUMOVAX 23) 25 mcg/0.5 mL injection 0.5 mL by IntraMUSCular route once for 1 dose. 0.5 mL 0    triamterene-hydroCHLOROthiazide (MAXZIDE) 37.5-25 mg per tablet take 1 tablet by mouth once daily 90 Tab 1    pantoprazole (PROTONIX) 40 mg tablet Take 1 Tab by mouth daily. 90 Tab 0    FOLIC ACID/MULTIVIT-MIN/LUTEIN (CENTRUM SILVER PO) Take 1 Tab by mouth daily.  verapamil (CALAN) 40 mg tablet Take  by mouth two (2) times a day. Indications: FOR VERTIGO      IRON/VITAMIN B COMPLEX (GERITOL PO) Take  by mouth daily.  potassium 99 mg tablet Take 99 mg by mouth daily.  IBUPROFEN PO Take  by mouth as needed.  simvastatin (ZOCOR) 20 mg tablet Take 1 Tab by mouth nightly. Do NOT take with verapamil. Take chol medication in the evening 90 Tab 0    promethazine (PHENERGAN) 25 mg tablet Take 25 mg by mouth every six (6) hours as needed for Nausea.  MV,CA,MIN/FA/HERBAL COMP #223 (ESTROVEN MOOD & MEMORY PO) Take  by mouth as needed.  meclizine (ANTIVERT) 25 mg tablet take 1 tablet by mouth three times a day if needed 90 Tab 0     No Known Allergies    Review of Systems - General ROS: negative for - chills, fatigue, fever, hot flashes, malaise or night sweats  Cardiovascular ROS: no chest pain or dyspnea on exertion  Respiratory ROS: no cough, shortness of breath, or wheezing    Visit Vitals    /85 (BP 1 Location: Left arm, BP Patient Position: Sitting)    Pulse 75    Temp 97.2 °F (36.2 °C) (Oral)    Resp 16    Ht 5' 4\" (1.626 m)    Wt 166 lb (75.3 kg)    SpO2 98%    BMI 28.49 kg/m2     General Appearance:  Well developed, well nourished,alert and oriented x 3, and individual in no acute distress. Ears/Nose/Mouth/Throat:   Hearing grossly normal.         Neck: Supple, no lad, no bruits   Chest:   Lungs clear to auscultation bilaterally. Cardiovascular:  Regular rate and rhythm, S1, S2 normal, no murmur. Abdomen:   Soft, epigastric pain on palpation, bowel sounds are active. Extremities: No edema bilaterally. Pain on palpation of IT band and posterior knee and hamstring area, neg straight leg raise   Skin: Warm and dry, no suspicious lesions                 Diagnoses and all orders for this visit:    Pt presents for follow up on her medications. She reports still taking iron supplement. She still has some fatigue. 10. Iron deficiency anemia, unspecified iron deficiency anemia type  Need to assess if she still needs repletion  -     CBC W/O DIFF  -     FERRITIN  -     IRON PROFILE  -     METABOLIC PANEL, COMPREHENSIVE    11. Glucose intolerance (impaired glucose tolerance)  -     HEMOGLOBIN A1C WITH EAG  -     METABOLIC PANEL, COMPREHENSIVE    12. Vitamin D deficiency  -     VITAMIN D, 25 HYDROXY    13. Mixed hyperlipidemia  Cont meds  Will check 10 year cv risk and increase statin if needed  -     LIPID PANEL    14. Fatigue, unspecified type  -     CBC W/O DIFF  -     HEMOGLOBIN A1C WITH EAG  -     FERRITIN  -     IRON PROFILE  -     TSH 3RD GENERATION  Follow up in 6 months or prn    This note will not be viewable in MyChart.

## 2018-02-20 NOTE — MR AVS SNAPSHOT
[facility] 
 
 
 [address] [place] [address] 
[phone] Patient: Fred Arredondo MRN: [id number] Visit Information Date & Time Provider Department Dept. Phone Encounter #  
 2/20/2018  9:00 AM Aleksandra Stone MD [facility] [address] [facility] [id number] Upcoming Health Maintenance Date Due Hepatitis C Screening 1950 GLAUCOMA SCREENING Q2Y 4/21/2013 Pneumococcal 65+ Low/Medium Risk (1 of 2 - PCV13) 1/1/2014 Influenza Age 5 to Adult 8/1/2017 MEDICARE YEARLY EXAM 2/3/2018 COLONOSCOPY 7/17/2018 BREAST CANCER SCRN MAMMOGRAM 1/15/2020 DTaP/Tdap/Td series (2 - Td) 7/1/2020 Allergies as of 2/20/2018  Review Complete On: 2/20/2018 By: Aleksandra Stone MD  
 No Known Allergies Current Immunizations  Reviewed on 10/14/2016 Name Date Influenza Vaccine Whole 9/12/2011 Pneumococcal Polysaccharide (PPSV-23) 1/1/2013 TB Skin Test (PPD) 1/1/2012 TDAP Vaccine 7/1/2010 Not reviewed this visit You Were Diagnosed With   
  
 Codes Comments Medicare annual wellness visit, subsequent    -  Primary ICD-10-CM: Z00.00 ICD-9-CM: V70.0 Immunization due     ICD-10-CM: Z23 ICD-9-CM: V05.9 Encounter for hepatitis C screening test for low risk patient     ICD-10-CM: Z11.59 
ICD-9-CM: V73.89 Post-menopause     ICD-10-CM: Z78.0 ICD-9-CM: V49.81 Screening for alcoholism     ICD-10-CM: Z13.89 ICD-9-CM: V79.1 Screening for depression     ICD-10-CM: Z13.89 ICD-9-CM: V79.0 Screen for colon cancer     ICD-10-CM: Z12.11 ICD-9-CM: V76.51 Encounter for screening mammogram for malignant neoplasm of breast     ICD-10-CM: Z12.31 
ICD-9-CM: V76.12 Disorder of bone and cartilage     ICD-10-CM: M89.9, M94.9 ICD-9-CM: 733.90 Iron deficiency anemia, unspecified iron deficiency anemia type     ICD-10-CM: D50.9 ICD-9-CM: 280.9 Glucose intolerance (impaired glucose tolerance)     ICD-10-CM: R73.02 
ICD-9-CM: 790.22 Vitamin D deficiency     ICD-10-CM: E55.9 ICD-9-CM: 268.9 Mixed hyperlipidemia     ICD-10-CM: E78.2 ICD-9-CM: 272.2 Fatigue, unspecified type     ICD-10-CM: R53.83 ICD-9-CM: 780.79 Vitals BP Pulse Temp Resp Height(growth percentile) Weight(growth percentile) 122/85 (BP 1 Location: Left arm, BP Patient Position: Sitting) 75 97.2 °F (36.2 °C) (Oral) 16 5' 4\" (1.626 m) 166 lb (75.3 kg) SpO2 BMI OB Status Smoking Status 98% 28.49 kg/m2 Hysterectomy Former Smoker BMI and BSA Data Body Mass Index Body Surface Area  
 28.49 kg/m 2 1.84 m 2 Your Updated Medication List  
  
   
This list is accurate as of: 2/20/18 10:01 AM.  Always use your most recent med list.  
  
  
  
  
 CENTRUM SILVER PO Take 1 Tab by mouth daily. ESTROVEN MOOD & MEMORY PO Take  by mouth as needed. GERITOL PO Take  by mouth daily. IBUPROFEN PO Take  by mouth as needed. meclizine 25 mg tablet Commonly known as:  ANTIVERT  
take 1 tablet by mouth three times a day if needed  
  
 pantoprazole 40 mg tablet Commonly known as:  PROTONIX Take 1 Tab by mouth daily. pneumococcal 13 aneesh conj dip 0.5 mL Syrg injection Commonly known as:  PREVNAR 13 (PF)  
0.5 mL by IntraMUSCular route once for 1 dose. potassium 99 mg tablet Take 99 mg by mouth daily. promethazine 25 mg tablet Commonly known as:  PHENERGAN Take 25 mg by mouth every six (6) hours as needed for Nausea. simvastatin 20 mg tablet Commonly known as:  ZOCOR Take 1 Tab by mouth nightly. Do NOT take with verapamil. Take chol medication in the evening  
  
 triamterene-hydroCHLOROthiazide 37.5-25 mg per tablet Commonly known as:  MAXZIDE  
take 1 tablet by mouth once daily  
  
 verapamil 40 mg tablet Commonly known as:  CALAN  
 Take  by mouth two (2) times a day. Indications: FOR VERTIGO Prescriptions Printed Refills  
 pneumococcal 13 aneesh conj dip (PREVNAR 13, PF,) 0.5 mL syrg injection 0 Si.5 mL by IntraMUSCular route once for 1 dose. Class: Print Route: IntraMUSCular We Performed the Following CBC W/O DIFF [08691 CPT(R)] FERRITIN [91317 CPT(R)] HEMOGLOBIN A1C WITH EAG [55314 CPT(R)] HEPATITIS C AB [65324 CPT(R)] IRON PROFILE V8229793 CPT(R)] LIPID PANEL [10437 CPT(R)] METABOLIC PANEL, COMPREHENSIVE [37547 CPT(R)] REFERRAL FOR COLONOSCOPY [ZDK590 Custom] TSH 3RD GENERATION [73406 CPT(R)] VITAMIN D, 25 HYDROXY D3435418 CPT(R)] To-Do List   
 2018 Imaging:  DEXA BONE DENSITY STUDY AXIAL   
  
 2018 Imaging:  EZRA MAMMO BI SCREENING INCL CAD   
  
 2018 Lab:  OCCULT BLOOD, IMMUNOASSAY (FIT) Referral Information Referral ID Referred By Referred To  
  
 [id number] Himanshu PUENTES Not Available Visits Status Start Date End Date 1 New Request 18 If your referral has a status of pending review or denied, additional information will be sent to support the outcome of this decision. Patient Instructions Medicare Wellness Visit, Female The best way to live healthy is to have a healthy lifestyle by eating a well-balanced diet, exercising regularly, limiting alcohol and stopping smoking. Regular physical exams and screening tests are another way to keep healthy. Preventive exams provided by your health care provider can find health problems before they become diseases or illnesses. Preventive services including immunizations, screening tests, monitoring and exams can help you take care of your own health. All people over age 72 should have a pneumovax  and and a prevnar shot to prevent pneumonia. These are once in a lifetime unless you and your provider decide differently. All people over 65 should have a yearly flu shot and a tetanus vaccine every 10 years. A bone mass density to screen for osteoporosis or thinning of the bones should be done every 2 years after 65. Screening for diabetes mellitus with a blood sugar test should be done every year. Glaucoma is a disease of the eye due to increased ocular pressure that can lead to blindness and it should be done every year by an eye professional. 
 
Cardiovascular screening tests that check for elevated lipids (fatty part of blood) which can lead to heart disease and strokes should be done every 5 years. Colorectal screening that evaluates for blood or polyps in your colon should be done yearly as a stool test or every five years as a flexible sigmoidoscope or every 10 years as a colonoscopy up to age 76. Breast cancer screening with a mammogram is recommended biennially  for women age 54-69. Screening for cervical cancer with a pap smear and pelvic exam is recommended for women after age 72 years every 2 years up to age 79 or when the provider and patient decide to stop. If there is a history of cervical abnormalities or other increased risk for cancer then the test is recommended yearly. Hepatitis C screening is also recommended for anyone born between [birth year] through [address]. A shingles vaccine is also recommended once in a lifetime after age 61. Your Medicare Wellness Exam is recommended annually. Here is a list of your current Health Maintenance items with a due date: 
Health Maintenance Due Topic Date Due  
 Hepatitis C Test  1950  Glaucoma Screening   04/21/2013  Pneumococcal Vaccine (1 of 2 - PCV13) 01/01/2014  Flu Vaccine  08/01/2017 [employer] Annual Well Visit  02/03/2018  Colonoscopy  07/17/2018 Introducing [facility]! Dear Awilda Sosa: Thank you for requesting a [employer] account. Our records indicate that you already have an active [employer] [facility] account.   You can access your account anytime at [email or web address] Did you know that you can access your hospital and ER discharge instructions at any time in [employer]? You can also review all of your test results from your hospital stay or ER visit. Additional Information If you have questions, please visit the Frequently Asked Questions section of the [employer] website at [email or web address] Remember, [employer] is NOT to be used for urgent needs. For medical emergencies, dial 911. Now available from your iPhone and Android! Please provide this summary of care documentation to your next provider. Your primary care clinician is listed as [facility]. If you have any questions after today's visit, please call [phone].

## 2018-02-20 NOTE — PROGRESS NOTES
Chief Complaint   Patient presents with   [facility] Annual Wellness Visit     1. Have you been to the ER, urgent care clinic since your last visit? Hospitalized since your last visit? No    2. Have you seen or consulted any other health care providers outside of the [address] [place] [address] since your last visit? Include any pap smears or colon screening.  no

## 2018-02-20 NOTE — PATIENT INSTRUCTIONS

## 2018-02-21 LAB
25(OH)D3+25(OH)D2 SERPL-MCNC: 30.5 NG/ML (ref 30–100)
ALBUMIN SERPL-MCNC: 4.3 G/DL (ref 3.6–4.8)
ALBUMIN/GLOB SERPL: 1.3 {RATIO} (ref 1.2–2.2)
ALP SERPL-CCNC: 63 IU/L (ref 39–117)
ALT SERPL-CCNC: 16 IU/L (ref 0–32)
AST SERPL-CCNC: 16 IU/L (ref 0–40)
BILIRUB SERPL-MCNC: 0.4 MG/DL (ref 0–1.2)
BUN SERPL-MCNC: 14 MG/DL (ref 8–27)
BUN/CREAT SERPL: 16 (ref 12–28)
CALCIUM SERPL-MCNC: 9.9 MG/DL (ref 8.7–10.3)
CHLORIDE SERPL-SCNC: 101 MMOL/L (ref 96–106)
CHOLEST SERPL-MCNC: 252 MG/DL (ref 100–199)
CO2 SERPL-SCNC: 23 MMOL/L (ref 18–29)
CREAT SERPL-MCNC: 0.86 MG/DL (ref 0.57–1)
ERYTHROCYTE [DISTWIDTH] IN BLOOD BY AUTOMATED COUNT: 16 % (ref 12.3–15.4)
EST. AVERAGE GLUCOSE BLD GHB EST-MCNC: 126 MG/DL
FERRITIN SERPL-MCNC: 138 NG/ML (ref 15–150)
GFR SERPLBLD CREATININE-BSD FMLA CKD-EPI: 69 ML/MIN/{1.73_M2}
GFR SERPLBLD CREATININE-BSD FMLA CKD-EPI: 80 ML/MIN/{1.73_M2}
GLOBULIN SER CALC-MCNC: 3.4 G/L (ref 1.5–4.5)
GLUCOSE SERPL-MCNC: 96 MG/DL (ref 65–99)
HBA1C MFR BLD: 6 % (ref 4.8–5.6)
HCT VFR BLD AUTO: 39 % (ref 34–46.6)
HCV AB S/CO SERPL IA: 0.3 S/CO RATIO (ref 0–0.9)
HDLC SERPL-MCNC: 59 MG/DL
HGB BLD-MCNC: 12.6 G/DL (ref 11.1–15.9)
INTERPRETATION, 910389: NORMAL
IRON SATN MFR SERPL: 21 % (ref 15–55)
IRON SERPL-MCNC: 71 UG/DL (ref 27–139)
LDLC SERPL CALC-MCNC: 169 MG/DL (ref 0–99)
MCH RBC QN AUTO: 25.1 PG (ref 26.6–33)
MCHC RBC AUTO-ENTMCNC: 32.3 G/DL (ref 31.5–35.7)
MCV RBC AUTO: 78 FL (ref 79–97)
PLATELET # BLD AUTO: 289 X10E3/UL (ref 150–379)
POTASSIUM SERPL-SCNC: 3.7 MMOL/L (ref 3.5–5.2)
PROT SERPL-MCNC: 7.7 G/DL (ref 6–8.5)
RBC # BLD AUTO: 5.01 X10E6/UL (ref 3.77–5.28)
SODIUM SERPL-SCNC: 139 MMOL/L (ref 134–144)
TIBC SERPL-MCNC: 335 UG/DL (ref 250–450)
TRIGL SERPL-MCNC: 121 MG/DL (ref 0–149)
TSH SERPL DL<=0.005 MIU/L-ACNC: 3.16 UIU/ML (ref 0.45–4.5)
UIBC SERPL-MCNC: 264 UG/DL (ref 118–369)
VLDLC SERPL CALC-MCNC: 24 MG/DL (ref 5–40)
WBC # BLD AUTO: 6.3 X10E3/UL (ref 3.4–10.8)

## 2018-02-24 RX ORDER — SIMVASTATIN 40 MG/1
40 TABLET, FILM COATED ORAL
Qty: 90 TAB | Refills: 1 | Status: SHIPPED | OUTPATIENT
Start: 2018-02-24 | End: 2018-02-28

## 2018-02-28 ENCOUNTER — OFFICE VISIT (OUTPATIENT)
Dept: OBGYN CLINIC | Age: 68
End: 2018-02-28

## 2018-02-28 VITALS — BODY MASS INDEX: 29.01 KG/M2 | SYSTOLIC BLOOD PRESSURE: 124 MMHG | DIASTOLIC BLOOD PRESSURE: 80 MMHG | WEIGHT: 169 LBS

## 2018-02-28 DIAGNOSIS — Z01.419 ENCOUNTER FOR GYNECOLOGICAL EXAMINATION WITHOUT ABNORMAL FINDING: Primary | ICD-10-CM

## 2018-02-28 DIAGNOSIS — Z85.42 HISTORY OF ENDOMETRIAL CANCER: ICD-10-CM

## 2018-02-28 NOTE — PROGRESS NOTES
Annual exam ages 40-58 post hysterectomy    Philip Tanner is a No obstetric history on file. ,  69 y.o. female [address] No LMP recorded. Patient has had a hysterectomy. .    She presents for her annual checkup. She is having a vaginal odor. . Noticed odor approx 9m ago. Last sexual intercourse 2 years ago  S/p WILFRID/ BSO  for cervical cancer; also received chemo    Hormonal status:  She reports no perimenstrual type symptoms. She is not having vasomotor symptoms. The patient is not using any ERT. Sexual history:    She  reports that she does not currently engage in sexual activity. Medical conditions:    Since her last annual GYN exam about 10 years ago, she has not the following changes in her health history: none. Surgical history confirmed with patient. has a past surgical history that includes endoscopy, colon, diagnostic (3/12/2010); hx hysterectomy (); hx  section (, ); and hx heent (). Pap and Mammogram History:    Her most recent Pap smear was normal, obtained many year(s) ago. The patient had a recent mammogram in October which was negative for malignancy. Breast Cancer History/Substance Abuse: negative    Osteoporosis History:    Family history does not include a first or second degree relative with osteopenia or osteoporosis.       Past Medical History:   Diagnosis Date    Arthritis     IN HIPS    Brain tumor ([address]     Pt being monitored by Dr Warner Barnes, meningioma    Environmental allergies     GERD (gastroesophageal reflux disease)     High cholesterol     Hypertension     Migraine     \"AFFECTS EYES\"; EPISODES START WITH EAR FULLNESS SENSATION    S/P chemotherapy, time since greater than 12 weeks     Uterine cancer ([address]      was seen by Dr. 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 J[facility]yn HX HEENT  2012    shunt placed behind left ear to help with vertigo    HX HYSTERECTOMY  1996    WITH BSO       Current Outpatient Prescriptions   Medication Sig Dispense Refill    triamterene-hydroCHLOROthiazide (MAXZIDE) 37.5-25 mg per tablet take 1 tablet by mouth once daily 90 Tab 1    pantoprazole (PROTONIX) 40 mg tablet Take 1 Tab by mouth daily. 90 Tab 0    verapamil (CALAN) 40 mg tablet Take  by mouth two (2) times a day. Indications: FOR VERTIGO      IRON/VITAMIN B COMPLEX (GERITOL PO) Take  by mouth daily.  potassium 99 mg tablet Take 99 mg by mouth daily.  MV,CA,MIN/FA/HERBAL COMP #223 (ESTROVEN MOOD & MEMORY PO) Take  by mouth as needed.  IBUPROFEN PO Take  by mouth as needed.  meclizine (ANTIVERT) 25 mg tablet take 1 tablet by mouth three times a day if needed 90 Tab 0    simvastatin (ZOCOR) 40 mg tablet Take 1 Tab by mouth nightly. Do NOT take with verapamil. Take chol medication in the evening 90 Tab 1    FOLIC ACID/MULTIVIT-MIN/LUTEIN (CENTRUM SILVER PO) Take 1 Tab by mouth daily.  promethazine (PHENERGAN) 25 mg tablet Take 25 mg by mouth every six (6) hours as needed for Nausea. Allergies: Review of patient's allergies indicates no known allergies. Tobacco History:  reports that she quit smoking about 24 years ago. Her smoking use included Cigarettes. She has a 10.00 pack-year smoking history. She has never used smokeless tobacco.  Alcohol Abuse:  reports that she does not drink alcohol. Drug Abuse:  reports that she does not use illicit drugs.     Family Medical/Cancer History:   Family History   Problem Relation Age of Onset    Cancer Mother      colon    Stroke Mother     Heart Disease Mother     Diabetes Father     Cancer Father      kidney    Diabetes Sister     Elevated Lipids Sister     Heart Disease Sister     Hypertension Sister     Breast Cancer Sister 61  Heart Disease Brother     Breast Cancer Other     Breast Cancer Other     Anesth Problems Neg Hx         Review of Systems - History obtained from the patient  Constitutional: negative for weight loss, fever, night sweats  HEENT: negative for hearing loss, earache, congestion, snoring, sorethroat  CV: negative for chest pain, palpitations, edema  Resp: negative for cough, shortness of breath, wheezing  GI: negative for change in bowel habits, abdominal pain, black or bloody stools  : negative for frequency, dysuria, hematuria, vaginal discharge  MSK: negative for back pain, joint pain, muscle pain  Breast: negative for breast lumps, nipple discharge, galactorrhea  Skin :negative for itching, rash, hives  Neuro: negative for dizziness, headache, confusion, weakness  Psych: negative for anxiety, depression, change in mood  Heme/lymph: negative for bleeding, bruising, pallor    Physical Exam    Visit Vitals    /80 (BP 1 Location: Left arm, BP Patient Position: Sitting)    Wt 169 lb (76.7 kg)    BMI 29.01 kg/m2     Constitutional  · Appearance: well-nourished, well developed, alert, in no acute distress    HENT  · Head and Face: appears normal      Chest  · Respiratory Effort: breathing unlabored    Breasts  · Inspection of Breasts: breasts symmetrical, no skin changes, no discharge present, nipple appearance normal, no skin retraction present  · Palpation of Breasts and Axillae: no masses present on palpation, no breast tenderness  · Axillary Lymph Nodes: no lymphadenopathy present    Gastrointestinal  · Abdominal Examination: abdomen non-tender to palpation, normal bowel sounds, no masses present  · Liver and spleen: no hepatomegaly present, spleen not palpable  · Hernias: no hernias identified    Genitourinary  · External Genitalia: normal appearance for age, no discharge present, no tenderness present, no inflammatory lesions present, no masses present, no atrophy present  · Vagina: normal vaginal vault without central or paravaginal defects, scant yellow discharge present, no inflammatory lesions present, no masses present  · Bladder: non-tender to palpation  · Urethra: appears normal  · Cervix: absent  · Uterus: absent  · Adnexa: no adnexal tenderness present, no adnexal masses present  · Perineum: perineum within normal limits, no evidence of trauma, no rashes or skin lesions present  · Anus: anus within normal limits, no hemorrhoids present  · Inguinal Lymph Nodes: no lymphadenopathy present    Skin  · General Inspection: no rash, no lesions identified    Neurologic/Psychiatric  · Mental Status:  · Orientation: grossly oriented to person, place and time  · Mood and Affect: mood normal, affect appropriate    Assessment:  Routine gynecologic examination; postmenopausal  Hx cervical cancer; s/p WILFRID/ BSO and chemo 1996  Her current medical status is satisfactory with no evidence of significant gynecologic issues.     Plan:  Pap sent today  Recommend trial of cultrelle; fu for cultures if not marked improvement  Counseled re: diet, exercise, healthy lifestyle  Return for yearly wellness visits  Rec annual mammogram

## 2018-02-28 NOTE — PATIENT INSTRUCTIONS
Pelvic Exam: Care Instructions  Your Care Instructions    When your doctor examines all of your pelvic organs, it's called a pelvic exam. Two good reasons to have this kind of exam are to check for sexually transmitted infections (STIs) and to get a Pap test. A Pap test is also called a Pap smear. It checks for early changes that can lead to cancer of the cervix. Sometimes a pelvic exam is part of a regular checkup. In this case, you can do some things to make your test results as accurate as possible. · Try to schedule the exam when you don't have your period. · Don't use douches, tampons, or vaginal medicines, sprays, or powders for 24 hours before your exam.  · Don't have sex for 24 hours before your exam.  Other times, women have this kind of exam at any time of the month. This is because they have pelvic pain, bleeding, or discharge. Or they may have another pelvic problem. Before your exam, it's important to share some information with your doctor. For example, if you are a survivor of rape or sexual abuse, you can talk about any concerns you may have. Your doctor will also want to know if you are pregnant or use birth control. And he or she will want to hear about any problems, surgeries, or procedures you have had in your pelvic area. You will also need to tell your doctor when your last period was. Follow-up care is a key part of your treatment and safety. Be sure to make and go to all appointments, and call your doctor if you are having problems. It's also a good idea to know your test results and keep a list of the medicines you take. How is a pelvic exam done? · During a pelvic exam, you will:  ¨ Take off your clothes below the waist. You will get a paper or cloth cover to put over the lower half of your body. Yoseph Alar on your back on an exam table. Your feet will be raised above you. Stirrups will support your feet. · The doctor will:  Umm Mckeono you to relax your knees.  Your knees need to lean out, toward the walls. ¨ Check the opening of your vagina for sores or swelling. ¨ Gently put a tool called a speculum into your vagina. It opens the vagina a little bit. You will feel some pressure. But if you are relaxed, it will not hurt. It lets your doctor see inside the vagina. ¨ Use a small brush, spatula, or swab to get a sample of cells, if you are having a Pap test or culture. The doctor then removes the speculum. ¨ Put on gloves and put one or two fingers of one hand into your vagina. The other hand goes on your lower belly. This lets your doctor feel your pelvic organs. You will probably feel some pressure. Try to stay relaxed. ¨ Put one gloved finger into your rectum and one into your vagina, if needed. This can also help check your pelvic organs. This exam takes about 10 minutes. At the end, you will get a washcloth or tissue to clean your vaginal area. It's normal to have some discharge after this exam. You can then get dressed. Some test results may be ready right away. But results from a culture or a Pap test may take several days or a few weeks. Why should you have a pelvic exam?  · You want to have recommended screening tests. This includes a Pap test.  · You think you have a vaginal infection. Signs include itching, burning, or unusual discharge. · You might have been exposed to a sexually transmitted infection (STI), such as chlamydia or herpes. · You have vaginal bleeding that is not part of your normal menstrual period. · You have pain in your belly or pelvis. · You have been sexually assaulted. A pelvic exam lets your doctor collect evidence and check for STIs. · You are pregnant. · You are having trouble getting pregnant. What are the risks of a pelvic exam?  There are no risks from a pelvic exam.  When should you call for help? Watch closely for changes in your health, and be sure to contact your doctor if you have any problems. Where can you learn more?   Go to http://john paul-cirilo.info/. Enter K935 in the search box to learn more about \"Pelvic Exam: Care Instructions. \"  Current as of: October 13, 2016  Content Version: 11.4  © 7234-0646 Healthwise, [employer]. Care instructions adapted under license by [employer] (which disclaims liability or warranty for this information). If you have questions about a medical condition or this instruction, always ask your healthcare professional. [place] [address] any warranty or liability for your use of this information.

## 2018-03-02 LAB
CYTOLOGIST CVX/VAG CYTO: NORMAL
CYTOLOGY CVX/VAG DOC THIN PREP: NORMAL
DX ICD CODE: NORMAL
LABCORP, 190119: NORMAL
Lab: NORMAL
OTHER STN SPEC: NORMAL
PATH REPORT.FINAL DX SPEC: NORMAL
STAT OF ADQ CVX/VAG CYTO-IMP: NORMAL

## 2018-03-20 RX ORDER — TRIAMTERENE/HYDROCHLOROTHIAZID 37.5-25 MG
TABLET ORAL
Qty: 90 TAB | Refills: 1 | Status: SHIPPED | OUTPATIENT
Start: 2018-03-20 | End: 2018-08-21 | Stop reason: SDUPTHER

## 2018-03-22 DIAGNOSIS — Z12.11 SCREEN FOR COLON CANCER: ICD-10-CM

## 2018-03-24 LAB — HEMOCCULT STL QL IA: NEGATIVE

## 2018-05-15 ENCOUNTER — OFFICE VISIT (OUTPATIENT)
Dept: INTERNAL MEDICINE CLINIC | Age: 68
End: 2018-05-15

## 2018-05-15 VITALS
HEART RATE: 83 BPM | TEMPERATURE: 98.2 F | HEIGHT: 64 IN | RESPIRATION RATE: 16 BRPM | SYSTOLIC BLOOD PRESSURE: 118 MMHG | OXYGEN SATURATION: 98 % | BODY MASS INDEX: 28.85 KG/M2 | DIASTOLIC BLOOD PRESSURE: 80 MMHG | WEIGHT: 169 LBS

## 2018-05-15 DIAGNOSIS — E78.2 MIXED HYPERLIPIDEMIA: Primary | ICD-10-CM

## 2018-05-15 DIAGNOSIS — J01.00 SUBACUTE MAXILLARY SINUSITIS: ICD-10-CM

## 2018-05-15 DIAGNOSIS — I10 ESSENTIAL HYPERTENSION: ICD-10-CM

## 2018-05-15 RX ORDER — MONTELUKAST SODIUM 10 MG/1
10 TABLET ORAL DAILY
Qty: 30 TAB | Refills: 3 | Status: SHIPPED | OUTPATIENT
Start: 2018-05-15 | End: 2018-08-21 | Stop reason: ALTCHOICE

## 2018-05-15 RX ORDER — PRAVASTATIN SODIUM 10 MG/1
10 TABLET ORAL
Qty: 90 TAB | Refills: 3 | Status: SHIPPED | OUTPATIENT
Start: 2018-05-15 | End: 2018-05-15 | Stop reason: SDUPTHER

## 2018-05-15 RX ORDER — DOXYCYCLINE 100 MG/1
100 TABLET ORAL 2 TIMES DAILY
Qty: 20 TAB | Refills: 0 | Status: SHIPPED | OUTPATIENT
Start: 2018-05-15 | End: 2018-08-21 | Stop reason: ALTCHOICE

## 2018-05-15 RX ORDER — PRAVASTATIN SODIUM 10 MG/1
10 TABLET ORAL
Qty: 30 TAB | Refills: 0 | Status: SHIPPED | OUTPATIENT
Start: 2018-05-15 | End: 2018-08-21 | Stop reason: SDUPTHER

## 2018-05-15 NOTE — PROGRESS NOTES
Chief Complaint   Patient presents with    Cold Symptoms     Sinusitis  Pt reports developed cold sx 3 weeks ago. She reports she actually got better but was still nasal.  She took lewis seltzer plus, mucinex, dayquil. No fevers. She has sinus pain and pressure up in her head. She initially had green thick nasal discharge. No sob, no wheezing. She report she is feeling better but just not resolving. Anemia  She reports long hx of anemia as a child. cholsterol  Eating heart healthy, Not eating fast food  Exercise at home twice a week does video will increase to 3-4 /week  She is no longer taking simvastatin due to elevated cpk but did take 1 dose due to eating lots of ice cream in her past but no se      Leg pain  She is s/p right hip surgery and doing well. She was anemic after surgery still taking iron every other day. Subjective:   Meera Brasher is a 77 y.o. female with hypertension. Hypertension ROS: taking medications as instructed, no medication side effects noted, no TIA's, no chest pain on exertion, no dyspnea on exertion, no swelling of ankles. New concerns: none      Glucose intolerance  Labs and diet reviewed with pt    Past Medical History:   Diagnosis Date    Arthritis     IN HIPS    Brain tumor ([address]     Pt being monitored by Dr Dajuan Gómez, meningioma    Environmental allergies     GERD (gastroesophageal reflux disease)     High cholesterol     Hypertension     Migraine     \"AFFECTS EYES\"; EPISODES START WITH EAR FULLNESS SENSATION    S/P chemotherapy, time since greater than 12 weeks 1996    Uterine cancer ([address] 1996     was seen by Dr. Eligio Qureshi Vertigo or labyrinthine disorder 10/31/2011    ENT shunt placed on left side behind ear     Past Surgical History:   Procedure Laterality Date    ENDOSCOPY, COLON, DIAGNOSTIC  3/12/2010    ([facility])-f/u 5 yrs.     [address] HX HEENT  2012    shunt placed behind left ear to help with vertigo  HX HYSTERECTOMY  1996    WITH BSO     Social History     Social History    Marital status:      Spouse name: N/A    Number of children: N/A    Years of education: N/A     Social History Main Topics    Smoking status: Former Smoker     Packs/day: 0.50     Years: 20.00     Types: Cigarettes     Quit date: 1/1/1994    Smokeless tobacco: Never Used      Comment: not anymore    Alcohol use No    Drug use: No    Sexual activity: Not Currently      Comment: no     Other Topics Concern    None     Social History Narrative    Retired at 69 yo but had to stop working due to vertigo    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        Grandson, 25, great nephew,21,  temporarily living with her        2 daughters both healthy     Family History   Problem Relation Age of Onset    Cancer Mother      colon    Stroke Mother     Heart Disease Mother     Diabetes Father     Cancer Father      kidney    Diabetes Sister     Elevated Lipids Sister     Heart Disease Sister     Hypertension Sister     Breast Cancer Sister 61    Heart Disease Brother     Breast Cancer Other     Breast Cancer Other     Anesth Problems Neg Hx      Current Outpatient Prescriptions   Medication Sig Dispense Refill    folic acid/multivit-min/lutein (CENTRUM SILVER PO) Take  by mouth.  cyanocobalamin, vitamin B-12, (VITAMIN B-12 PO) Take  by mouth.  triamterene-hydroCHLOROthiazide (MAXZIDE) 37.5-25 mg per tablet take 1 tablet by mouth once daily 90 Tab 1    pantoprazole (PROTONIX) 40 mg tablet Take 1 Tab by mouth daily. 90 Tab 0    verapamil (CALAN) 40 mg tablet Take  by mouth two (2) times a day. Indications: FOR VERTIGO      potassium 99 mg tablet Take 99 mg by mouth daily.  IRON/VITAMIN B COMPLEX (GERITOL PO) Take  by mouth daily.  MV,CA,MIN/FA/HERBAL COMP #223 (ESTROVEN MOOD & MEMORY PO) Take  by mouth as needed.  IBUPROFEN PO Take  by mouth as needed.       meclizine (ANTIVERT) 25 mg tablet take 1 tablet by mouth three times a day if needed 90 Tab 0     No Known Allergies    Review of Systems - General ROS: negative for - chills, fatigue, fever, hot flashes, malaise or night sweats  Cardiovascular ROS: no chest pain or dyspnea on exertion  Respiratory ROS: no cough, shortness of breath, or wheezing    Visit Vitals    /80 (BP 1 Location: Left arm, BP Patient Position: Sitting)    Pulse 83    Temp 98.2 °F (36.8 °C) (Oral)    Resp 16    Ht 5' 4\" (1.626 m)    Wt 169 lb (76.7 kg)    SpO2 98%    BMI 29.01 kg/m2     General Appearance:  Well developed, well nourished,alert and oriented x 3, and individual in no acute distress. Ears/Nose/Mouth/Throat:   Hearing grossly normal.enlarged right nasal turbinate         Neck: Supple, no lad, no bruits   Chest:   Lungs clear to auscultation bilaterally. Cardiovascular:  Regular rate and rhythm, S1, S2 normal, no murmur. Abdomen:   Soft, epigastric pain on palpation, bowel sounds are active. Extremities: No edema bilaterally. Pain on palpation of IT band and posterior knee and hamstring area, neg straight leg raise   Skin: Warm and dry, no suspicious lesions                 Diagnoses and all orders for this visit:    1. Mixed hyperlipidemia  Take 1/2 po daily  Cholesterol not controlled but with se increased cpk with simvastatin  Will try prav and if sx with 1/2 tablet will not take  -     pravastatin (PRAVACHOL) 10 mg tablet; Take 1 Tab by mouth nightly. 2. Essential hypertension  Cont meds  stable    3. Subacute maxillary sinusitis  I think this is allergy related   If sx increase /ini will start doxy due to longevity of sx  -     montelukast (SINGULAIR) 10 mg tablet; Take 1 Tab by mouth daily. -     doxycycline (ADOXA) 100 mg tablet; Take 1 Tab by mouth two (2) times a day. Do not take any mvi with med      This note will not be viewable in MyChart.

## 2018-05-15 NOTE — PATIENT INSTRUCTIONS
Saline Nasal Washes: Care Instructions  Your Care Instructions  Saline nasal washes help keep the nasal passages open by washing out thick or dried mucus. This simple remedy can help relieve symptoms of allergies, sinusitis, and colds. It also can make the nose feel more comfortable by keeping the mucous membranes moist. You may notice a little burning sensation in your nose the first few times you use the solution, but this usually gets better in a few days. Follow-up care is a key part of your treatment and safety. Be sure to make and go to all appointments, and call your doctor if you are having problems. It's also a good idea to know your test results and keep a list of the medicines you take. How can you care for yourself at home? · You can buy premixed saline solution in a squeeze bottle or other sinus rinse products at a drugstore. Read and follow the instructions on the label. · You also can make your own saline solution by adding 1 teaspoon of salt and 1 teaspoon of baking soda to 2 cups of distilled water. · If you use a homemade solution, pour a small amount into a clean bowl. Using a rubber bulb syringe, squeeze the syringe and place the tip in the salt water. Pull a small amount of the salt water into the syringe by relaxing your hand. · Sit down with your head tilted slightly back. Do not lie down. Put the tip of the bulb syringe or the squeeze bottle a little way into one of your nostrils. Gently drip or squirt a few drops into the nostril. Repeat with the other nostril. Some sneezing and gagging are normal at first.  · Gently blow your nose. · Wipe the syringe or bottle tip clean after each use. · Repeat this 2 or 3 times a day. · Use nasal washes gently if you have nosebleeds often. When should you call for help? Watch closely for changes in your health, and be sure to contact your doctor if:  ? · You often get nosebleeds. ? · You have problems doing the nasal washes.    Where can you learn more? Go to http://john paul-cirilo.info/. Enter [id number] [id number] [phone] in the search box to learn more about \"Saline Nasal Washes: Care Instructions. \"  Current as of: May 12, 2017  Content Version: 11.4  © 0628-5764 Healthwise, [employer]. Care instructions adapted under license by [employer] (which disclaims liability or warranty for this information). If you have questions about a medical condition or this instruction, always ask your healthcare professional. [address] any warranty or liability for your use of this information.

## 2018-05-15 NOTE — MR AVS SNAPSHOT
[address] [place] [address] 
 
 
 [address] [place] [address] [place] 
[phone] Patient: Anjelica Lowry MRN: [id number] Visit Information Date & Time Provider Department Dept. Phone Encounter #  
 5/15/2018  2:00 PM Juancarlos Monroe MD [facility] [id number] Your Appointments 8/21/2018  9:40 AM  
ROUTINE CARE with Juancarlos Monroe MD  
[facility] Appt Note: 6 month [address] [place], [place] [address] [address]  
[phone]  
  
   
 [address] [place] [id number] Upcoming Health Maintenance Date Due  
 GLAUCOMA SCREENING Q2Y 4/21/2013 Pneumococcal 65+ Low/Medium Risk (1 of 2 - PCV13) 1/1/2014 COLONOSCOPY 7/17/2018 Influenza Age 5 to Adult 8/1/2018 MEDICARE YEARLY EXAM 2/21/2019 BREAST CANCER SCRN MAMMOGRAM 1/15/2020 DTaP/Tdap/Td series (2 - Td) 7/1/2020 Allergies as of 5/15/2018  Review Complete On: 5/15/2018 By: Juancarlos Monroe MD  
 No Known Allergies Current Immunizations  Reviewed on 10/14/2016 Name Date Influenza Vaccine Whole 9/12/2011 Pneumococcal Polysaccharide (PPSV-23) 1/1/2013 TB Skin Test (PPD) 1/1/2012 TDAP Vaccine 7/1/2010 Not reviewed this visit You Were Diagnosed With   
  
 Codes Comments Mixed hyperlipidemia    -  Primary ICD-10-CM: U38.7 ICD-9-CM: 272.2 Essential hypertension     ICD-10-CM: I10 
ICD-9-CM: 401.9 Subacute maxillary sinusitis     ICD-10-CM: J01.00 ICD-9-CM: 461.0 Vitals BP Pulse Temp Resp Height(growth percentile) Weight(growth percentile) 118/80 (BP 1 Location: Left arm, BP Patient Position: Sitting) 83 98.2 °F (36.8 °C) (Oral) 16 5' 4\" (1.626 m) 169 lb (76.7 kg) SpO2 BMI OB Status Smoking Status 98% 29.01 kg/m2 Hysterectomy Former Smoker BMI and BSA Data Body Mass Index Body Surface Area  
 29.01 kg/m 2 1.86 m 2 [facility]erred [facility] [address] [phone] Your Updated Medication List  
  
   
This list is accurate as of 5/15/18  2:47 PM.  Always use your most recent med list.  
  
  
  
  
 CENTRUM SILVER PO Take  by mouth. doxycycline 100 mg tablet Commonly known as:  ADOXA Take 1 Tab by mouth two (2) times a day. Do not take any mvi with med ESTROVEN MOOD & MEMORY PO Take  by mouth as needed. GERITOL PO Take  by mouth daily. IBUPROFEN PO Take  by mouth as needed. meclizine 25 mg tablet Commonly known as:  ANTIVERT  
take 1 tablet by mouth three times a day if needed  
  
 montelukast 10 mg tablet Commonly known as:  SINGULAIR Take 1 Tab by mouth daily. pantoprazole 40 mg tablet Commonly known as:  PROTONIX Take 1 Tab by mouth daily. potassium 99 mg tablet Take 99 mg by mouth daily. pravastatin 10 mg tablet Commonly known as:  PRAVACHOL Take 1 Tab by mouth nightly. triamterene-hydroCHLOROthiazide 37.5-25 mg per tablet Commonly known as:  MAXZIDE  
take 1 tablet by mouth once daily  
  
 verapamil 40 mg tablet Commonly known as:  CALAN Take  by mouth two (2) times a day. Indications: FOR VERTIGO  
  
 VITAMIN B-12 PO Take  by mouth. Prescriptions Sent to Pharmacy Refills  
 pravastatin (PRAVACHOL) 10 mg tablet 3 Sig: Take 1 Tab by mouth nightly. Class: Normal  
 [facility] Ph #: [phone] Route: Oral  
 montelukast (SINGULAIR) 10 mg tablet 3 Sig: Take 1 Tab by mouth daily. Class: Normal  
 [facility]acy: [facility] Ph #: [phone]  Route: Oral  
 doxycycline (ADOXA) 100 mg tablet 0  
 Sig: Take 1 Tab by mouth two (2) times a day. Do not take any mvi with med Class: Normal  
 [facility]acy: [facility] Ph #: [phone] Route: Oral  
  
Patient Instructions Saline Nasal Washes: Care Instructions Your Care Instructions Saline nasal washes help keep the nasal passages open by washing out thick or dried mucus. This simple remedy can help relieve symptoms of allergies, sinusitis, and colds. It also can make the nose feel more comfortable by keeping the mucous membranes moist. You may notice a little burning sensation in your nose the first few times you use the solution, but this usually gets better in a few days. Follow-up care is a key part of your treatment and safety. Be sure to make and go to all appointments, and call your doctor if you are having problems. It's also a good idea to know your test results and keep a list of the medicines you take. How can you care for yourself at home? · You can buy premixed saline solution in a squeeze bottle or other sinus rinse products at a drugstore. Read and follow the instructions on the label. · You also can make your own saline solution by adding 1 teaspoon of salt and 1 teaspoon of baking soda to 2 cups of distilled water. · If you use a homemade solution, pour a small amount into a clean bowl. Using a rubber bulb syringe, squeeze the syringe and place the tip in the salt water. Pull a small amount of the salt water into the syringe by relaxing your hand. · Sit down with your head tilted slightly back. Do not lie down. Put the tip of the bulb syringe or the squeeze bottle a little way into one of your nostrils. Gently drip or squirt a few drops into the nostril. Repeat with the other nostril. Some sneezing and gagging are normal at first. 
· Gently blow your nose. · Wipe the syringe or bottle tip clean after each use. · Repeat this 2 or 3 times a day. · Use nasal washes gently if you have nosebleeds often. When should you call for help? Watch closely for changes in your health, and be sure to contact your doctor if: 
? · You often get nosebleeds. ? · You have problems doing the nasal washes. Where can you learn more? Go to http://john paul-cirilo.info/. Enter [phone] in the search box to learn more about \"Saline Nasal Washes: Care Instructions. \" Current as of: May 12, 2017 Content Version: 11.4 © 1198-5941 [employer]. Care instructions adapted under license by [employer] (which disclaims liability or warranty for this information). If you have questions about a medical condition or this instruction, always ask your healthcare professional. [address] any warranty or liability for your use of this information. Introducing [facility]! Dear David Jimenez: Thank you for requesting a iBio account. Our records indicate that you already have an active iBio account. You can access your account anytime at [email or web address] Did you know that you can access your hospital and ER discharge instructions at any time in iBio? You can also review all of your test results from your hospital stay or ER visit. Additional Information If you have questions, please visit the Frequently Asked Questions section of the iBio website at [email or web address] Remember, iBio is NOT to be used for urgent needs. For medical emergencies, dial 911. Now available from your iPhone and Android! Please provide this summary of care documentation to your next provider. Your primary care clinician is listed as Prabhu Ricketts. If you have any questions after today's visit, please call [phone].

## 2018-06-18 ENCOUNTER — TELEPHONE (OUTPATIENT)
Dept: INTERNAL MEDICINE CLINIC | Age: 68
End: 2018-06-18

## 2018-06-18 NOTE — TELEPHONE ENCOUNTER
Patient states her arm has been causing her a lot of pain and is having issues sleeping at nigh. Requesting a name of a doctor she can see to help assist her with her pain.  She can be reached at [phone] vm is ok

## 2018-08-21 ENCOUNTER — OFFICE VISIT (OUTPATIENT)
Dept: INTERNAL MEDICINE CLINIC | Age: 68
End: 2018-08-21

## 2018-08-21 VITALS
TEMPERATURE: 97.8 F | HEART RATE: 75 BPM | DIASTOLIC BLOOD PRESSURE: 78 MMHG | HEIGHT: 64 IN | BODY MASS INDEX: 29.26 KG/M2 | RESPIRATION RATE: 18 BRPM | WEIGHT: 171.38 LBS | OXYGEN SATURATION: 98 % | SYSTOLIC BLOOD PRESSURE: 113 MMHG

## 2018-08-21 DIAGNOSIS — K63.5 SESSILE COLONIC POLYP: ICD-10-CM

## 2018-08-21 DIAGNOSIS — E78.2 MIXED HYPERLIPIDEMIA: ICD-10-CM

## 2018-08-21 DIAGNOSIS — I10 ESSENTIAL HYPERTENSION: Primary | ICD-10-CM

## 2018-08-21 DIAGNOSIS — H93.11 TINNITUS OF RIGHT EAR: ICD-10-CM

## 2018-08-21 RX ORDER — PRAVASTATIN SODIUM 10 MG/1
10 TABLET ORAL
Qty: 90 TAB | Refills: 0 | Status: SHIPPED | OUTPATIENT
Start: 2018-08-21 | End: 2018-11-21

## 2018-08-21 RX ORDER — TRIAMTERENE/HYDROCHLOROTHIAZID 37.5-25 MG
TABLET ORAL
Qty: 90 TAB | Refills: 0 | Status: SHIPPED | OUTPATIENT
Start: 2018-08-21 | End: 2018-12-24 | Stop reason: SDUPTHER

## 2018-08-21 NOTE — MR AVS SNAPSHOT
[address] [facility] [address] 
 
 
 [address] [facility] [address] 
[phone] Patient: Agnieszka Salinas MRN: [id number] Visit Information Date & Time Provider Department Dept. Phone Encounter #  
 8/21/2018  9:40 AM Anthony Machuca MD [facility] [address] [id number] Upcoming Health Maintenance Date Due Pneumococcal 65+ Low/Medium Risk (1 of 2 - PCV13) 1/1/2014 Influenza Age 5 to Adult 8/1/2018 MEDICARE YEARLY EXAM 2/21/2019 BREAST CANCER SCRN MAMMOGRAM 1/15/2020 GLAUCOMA SCREENING Q2Y 6/6/2020 DTaP/Tdap/Td series (2 - Td) 7/1/2020 COLONOSCOPY 8/21/2021 Allergies as of 8/21/2018  Review Complete On: 8/21/2018 By: Anthony Machuca MD  
 No Known Allergies Current Immunizations  Reviewed on 10/14/2016 Name Date Influenza Vaccine Whole 9/12/2011 Pneumococcal Polysaccharide (PPSV-23) 1/1/2013 TB Skin Test (PPD) 1/1/2012 TDAP Vaccine 7/1/2010 Not reviewed this visit You Were Diagnosed With   
  
 Codes Comments Essential hypertension    -  Primary ICD-10-CM: I10 
ICD-9-CM: 401.9 Mixed hyperlipidemia     ICD-10-CM: E78.2 ICD-9-CM: 272.2 Tinnitus of right ear     ICD-10-CM: H93.11 ICD-9-CM: 388.30 Sessile colonic polyp     ICD-10-CM: K63.5 ICD-9-CM: 211.3 Vitals BP Pulse Temp Resp Height(growth percentile) Weight(growth percentile) 113/78 (BP 1 Location: Left arm, BP Patient Position: Sitting) 75 97.8 °F (36.6 °C) (Oral) 18 5' 4\" (1.626 m) 171 lb 6 oz (77.7 kg) SpO2 BMI OB Status Smoking Status 98% 29.42 kg/m2 Hysterectomy Former Smoker Vitals History BMI and BSA Data Body Mass Index Body Surface Area  
 29.42 kg/m 2 1.87 m 2 [facility] [address] [facility] [phone] Your Updated Medication List  
  
 This list is accurate as of 18 10:27 AM.  Always use your most recent med list.  
  
  
  
  
 CENTRUM SILVER PO Take  by mouth. IBUPROFEN PO Take  by mouth as needed. pantoprazole 40 mg tablet Commonly known as:  PROTONIX Take 1 Tab by mouth daily. pneumococcal 13 aneesh conj dip 0.5 mL Syrg injection Commonly known as:  PREVNAR-13  
0.5 mL by IntraMUSCular route once for 1 dose. potassium 99 mg tablet Take 99 mg by mouth daily. pravastatin 10 mg tablet Commonly known as:  PRAVACHOL Take 1 Tab by mouth nightly. Indications: mixed hyperlipidemia  
  
 triamterene-hydroCHLOROthiazide 37.5-25 mg per tablet Commonly known as:  MAXZIDE  
take 1 tablet by mouth once daily  
  
 verapamil 40 mg tablet Commonly known as:  CALAN Take  by mouth two (2) times a day. Indications: FOR VERTIGO  
  
 VITAMIN B-12 PO Take  by mouth. Prescriptions Printed Refills  
 pneumococcal 13 aneesh conj dip (PREVNAR-13) 0.5 mL syrg injection 0 Si.5 mL by IntraMUSCular route once for 1 dose. Class: Print Route: IntraMUSCular Prescriptions Sent to Pharmacy Refills  
 pravastatin (PRAVACHOL) 10 mg tablet 0 Sig: Take 1 Tab by mouth nightly. Indications: mixed hyperlipidemia Class: Normal  
 [facility]acy: [facility] Ph #: [phone] Route: Oral  
 triamterene-hydroCHLOROthiazide (MAXZIDE) 37.5-25 mg per tablet 0 Sig: take 1 tablet by mouth once daily Class: Normal  
 [facility] Ph #: [phone] We Performed the Following REFERRAL FOR COLONOSCOPY [[id number] Custom] Comments:  
 Please evaluate patient for sessile polyp and family hx of colon cancer. Referral Information  Referral ID Referred By Referred To  
  
 [id number] [address]   
 [facility] [place] [address]  [place] [address] Status Start Date End Date 1 New Request 8/21/18 8/21/19 If your referral has a status of pending review or denied, additional information will be sent to support the outcome of this decision. Introducing [facility]! Dear Venu Rasmussen: Thank you for requesting a xPeerient account. Our records indicate that you already have an active xPeerient account. You can access your account anytime at [email or web address] Did you know that you can access your hospital and ER discharge instructions at any time in xPeerient? You can also review all of your test results from your hospital stay or ER visit. Additional Information If you have questions, please visit the Frequently Asked Questions section of the xPeerient website at [email or web address] Remember, xPeerient is NOT to be used for urgent needs. For medical emergencies, dial 911. Now available from your iPhone and Android! Please provide this summary of care documentation to your next provider. Your primary care clinician is listed as Jefferson Ro. If you have any questions after today's visit, please call [phone].

## 2018-08-21 NOTE — PROGRESS NOTES
Chief Complaint   Patient presents with    Hypertension     Patient reports she has been doing well since last visit. 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 cholsterol  Eating heart healthy, Not eating fast food  Patient is not taking any pravastatin. She was on simvastatin but we stopped that due to the interaction with verapamil. She did not start pravastatin. Her LDL was 169 from her prior lab. This was discussed with her. She denies chest pain or shortness of breath      Subjective:   Ankit Adams is a 77 y.o. female with hypertension. Hypertension ROS: taking medications as instructed, no medication side effects noted, no TIA's, no chest pain on exertion, no dyspnea on exertion, no swelling of ankles. New concerns: none        Past Medical History:   Diagnosis Date    Arthritis     IN HIPS    [facility]     Pt being monitored by Dr Jud Coronel, meningioma    Environmental allergies     GERD (gastroesophageal reflux disease)     High cholesterol     Hypertension     Migraine     \"AFFECTS EYES\"; EPISODES START WITH EAR FULLNESS SENSATION    S/P chemotherapy, time since greater than 12 weeks 1996    [facility]ter[facility] ([facility] 1996     was seen by Dr. Michael Marshall Vertigo or labyrinthine disorder 10/31/2011    ENT shunt placed on left side behind ear     Past Surgical History:   Procedure Laterality Date    ENDOSCOPY, COLON, DIAGNOSTIC  3/12/2010    ([place])-f/u 5 yrs.    Jazmyn SPAULDING  2012    shunt placed behind left ear to help with vertigo    HX HYSTERECTOMY  1996    WITH BSO     Social History     Social History    Marital status:      Spouse name: N/A    Number of children: N/A    Years of education: N/A     Social History Main Topics    Smoking status: Former Smoker     Packs/day: 0.50     Years: 20.00     Types: Cigarettes     Quit date: 1/1/1994    Smokeless tobacco: Never Used      Comment: not anymore    Alcohol use No    Drug use: No    Sexual activity: Not Currently      Comment: no     Other Topics Concern    None     Social History Narrative    Retired at 69 yo but had to stop working due to vertigo    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        Grandson, 25, great nephew,21,  temporarily living with her        2 daughters both healthy     Family History   Problem Relation Age of Onset    Cancer Mother      colon    Stroke Mother     Heart Disease Mother     Diabetes Father     Cancer Father      kidney    Diabetes Sister     Elevated Lipids Sister     Heart Disease Sister     Hypertension Sister     Breast Cancer Sister 61    Heart Disease Brother     Breast Cancer Other     Breast Cancer Other     Anesth Problems Neg Hx      Current Outpatient Prescriptions   Medication Sig Dispense Refill    folic acid/multivit-min/lutein (CENTRUM SILVER PO) Take  by mouth.  cyanocobalamin, vitamin B-12, (VITAMIN B-12 PO) Take  by mouth.  triamterene-hydroCHLOROthiazide (MAXZIDE) 37.5-25 mg per tablet take 1 tablet by mouth once daily 90 Tab 1    pantoprazole (PROTONIX) 40 mg tablet Take 1 Tab by mouth daily. 90 Tab 0    verapamil (CALAN) 40 mg tablet Take  by mouth two (2) times a day. Indications: FOR VERTIGO      IRON/VITAMIN B COMPLEX (GERITOL PO) Take  by mouth daily.  potassium 99 mg tablet Take 99 mg by mouth daily.  IBUPROFEN PO Take  by mouth as needed.  pravastatin (PRAVACHOL) 10 mg tablet Take 1 Tab by mouth nightly.  30 Tab 0     No Known Allergies    Review of Systems - General ROS: negative for - chills, fatigue, fever, hot flashes, malaise or night sweats  Cardiovascular ROS: no chest pain or dyspnea on exertion  Respiratory ROS: no cough, shortness of breath, or wheezing    Visit Vitals    /78 (BP 1 Location: Left arm, BP Patient Position: Sitting)    Pulse 75    Temp 97.8 °F (36.6 °C) (Oral)    Resp 18    Ht 5' 4\" (1.626 m)  Wt 171 lb 6 oz (77.7 kg)    SpO2 98%    BMI 29.42 kg/m2     General Appearance:  Well developed, well nourished,alert and oriented x 3, and individual in no acute distress. Ears/Nose/Mouth/Throat:   Hearing grossly normal.enlarged right nasal turbinate         Neck: Supple, no lad, no bruits   Chest:   Lungs clear to auscultation bilaterally. Cardiovascular:  Regular rate and rhythm, S1, S2 normal, no murmur. Abdomen:   Soft, epigastric pain on palpation, bowel sounds are active. Extremities: No edema bilaterally. Pain on palpation of IT band and posterior knee and hamstring area, neg straight leg raise   Skin: Warm and dry, no suspicious lesions                 Diagnoses and all orders for this visit:    1. Essential hypertension  Patient appears to be controlled. Continue meds. She will follow-up in 3 months and we will check her CMP at that time. 2. Mixed hyperlipidemia  Patient has not been on any cholesterol medicine and has poorly controlled cholesterol. I discussed with her and there is no interactions between pravastatin and verapamil so she will take the pravastatin. We can do a CPK level at her next visit if she is having any myalgias  -     pravastatin (PRAVACHOL) 10 mg tablet; Take 1 Tab by mouth nightly. Indications: mixed hyperlipidemia    3. Tinnitus of right ear  Patient will continue on the verapamil per Dr. Aurea Solis. Follow-up with     4. Sessile colonic polyp  -     REFERRAL FOR COLONOSCOPY    Other orders  -     pneumococcal 13 aneesh conj dip (PREVNAR-13) 0.5 mL syrg injection; 0.5 mL by IntraMUSCular route once for 1 dose. -     triamterene-hydroCHLOROthiazide (MAXZIDE) 37.5-25 mg per tablet; take 1 tablet by mouth once daily    This note will not be viewable in [employer]. Follow-up in 3 months and we will check her labs and CK level.

## 2018-09-17 ENCOUNTER — TELEPHONE (OUTPATIENT)
Dept: INTERNAL MEDICINE CLINIC | Age: 68
End: 2018-09-17

## 2018-09-17 RX ORDER — TRIAMTERENE/HYDROCHLOROTHIAZID 37.5-25 MG
TABLET ORAL
Qty: 90 TAB | Refills: 0 | Status: CANCELLED | OUTPATIENT
Start: 2018-09-17

## 2018-09-17 NOTE — TELEPHONE ENCOUNTER
I spoke with patient to advise pcp sent in bp medication # 80 in Aug. Pt states the pharmacy only gave her the pravastatin. Pt will call the pharmacy to make sure they have her rx, if they don't she will call our office back for us to resend medication. Pt was thankful for the call.

## 2018-09-17 NOTE — TELEPHONE ENCOUNTER
----- Message from [facility] sent at 9/15/2018 10:05 AM EDT -----  Regarding: Dr. Hortencia Sims  Pt [phone] needs a refill on \"triamterenate hctz 37.5 mg\" called into [facility] [phone]

## 2018-11-14 RX ORDER — CHOLECALCIFEROL (VITAMIN D3) 125 MCG
CAPSULE ORAL
COMMUNITY
End: 2022-08-01

## 2018-11-19 ENCOUNTER — HOSPITAL ENCOUNTER (OUTPATIENT)
Age: 68
Setting detail: OUTPATIENT SURGERY
Discharge: HOME | End: 2018-11-19
Attending: INTERNAL MEDICINE | Admitting: INTERNAL MEDICINE

## 2018-11-19 RX ORDER — ATROPINE SULFATE 0.1 MG/ML
0.4 INJECTION INTRAVENOUS
Status: CANCELLED | OUTPATIENT
Start: 2018-11-19 | End: 2018-11-20

## 2018-11-19 RX ORDER — DEXTROMETHORPHAN/PSEUDOEPHED 2.5-7.5/.8
1.2 DROPS ORAL
Status: CANCELLED | OUTPATIENT
Start: 2018-11-19

## 2018-11-19 RX ORDER — EPINEPHRINE 0.1 MG/ML
1 INJECTION INTRACARDIAC; INTRAVENOUS
Status: CANCELLED | OUTPATIENT
Start: 2018-11-19 | End: 2018-11-20

## 2018-11-19 RX ORDER — MIDAZOLAM HYDROCHLORIDE 1 MG/ML
.25-5 INJECTION, SOLUTION INTRAMUSCULAR; INTRAVENOUS
Status: CANCELLED | OUTPATIENT
Start: 2018-11-19

## 2018-11-19 RX ORDER — FLUMAZENIL 0.1 MG/ML
0.2 INJECTION INTRAVENOUS
Status: CANCELLED | OUTPATIENT
Start: 2018-11-19 | End: 2018-11-19

## 2018-11-19 RX ORDER — NALOXONE HYDROCHLORIDE 0.4 MG/ML
0.4 INJECTION, SOLUTION INTRAMUSCULAR; INTRAVENOUS; SUBCUTANEOUS
Status: CANCELLED | OUTPATIENT
Start: 2018-11-19 | End: 2018-11-19

## 2018-11-19 RX ORDER — SODIUM CHLORIDE 9 MG/ML
50 INJECTION, SOLUTION INTRAVENOUS CONTINUOUS
Status: CANCELLED | OUTPATIENT
Start: 2018-11-19 | End: 2018-11-19

## 2018-11-19 NOTE — PERIOP NOTES
Patient presented for colonoscopy but her  left the facility. The Endoscopy Department offered the patient additional arrangements in an attempt to perform the procedure today. The patient called @ [phone] and requested that her procedure be \"postponed\". We checked our waiting areas to discuss with the patient but she was gone. Dr. Margie Sousa informed of the events.

## 2018-11-21 NOTE — PERIOP NOTES
[address] Endoscopy Preprocedure Instructions 1. On the day of your surgery, please report to registration located on the 2nd floor of the  [facility]. yes 2. You must have a responsible adult to drive you to the hospital, stay at the hospital during your procedure and drive you home. If they leave your procedure will not be started (no exceptions). yes 3. Do not have anything to eat or drink (including water, gum, mints, coffee, and juice) after midnight. This does not apply to the medications you were instructed to take by your physician. yesIf you are currently taking Plavix, Coumadin, Aspirin, or other blood-thinning agents, contact your physician for special instructions. yes, 
 
4. If you are having a procedure that requires bowel prep: We recommend that you have only clear liquids the day before your procedure and begin your bowel prep by 5:00 pm.  You may continue to drink clear liquids until midnight. If for any reason you are not able to complete your prep please notify your physician immediately. yes 5. Have a list of all current medications, including vitamins, herbal supplements and any other over the counter medications. yes 6. If you wear glasses, contacts, dentures and/or hearing aids, they may be removed prior to procedure, please bring a case to store them in. yes 7. You should understand that if you do not follow these instructions your procedure may be cancelled. If your physical condition changes (I.e. fever, cold or flu) please contact your doctor as soon as possible. 8. It is important that you be on time. If for any reason you are unable to keep your appointment please call )- the day of or your physicians office prior to your scheduled procedure

## 2018-11-27 ENCOUNTER — ANESTHESIA EVENT (OUTPATIENT)
Dept: ENDOSCOPY | Age: 68
End: 2018-11-27
Payer: MEDICARE

## 2018-11-27 ENCOUNTER — ANESTHESIA (OUTPATIENT)
Dept: ENDOSCOPY | Age: 68
End: 2018-11-27
Payer: MEDICARE

## 2018-11-27 ENCOUNTER — HOSPITAL ENCOUNTER (OUTPATIENT)
Age: 68
Setting detail: OUTPATIENT SURGERY
Discharge: HOME | End: 2018-11-27
Attending: INTERNAL MEDICINE | Admitting: INTERNAL MEDICINE
Payer: MEDICARE

## 2018-11-27 VITALS
HEART RATE: 73 BPM | OXYGEN SATURATION: 100 % | HEIGHT: 64 IN | WEIGHT: 171 LBS | SYSTOLIC BLOOD PRESSURE: 129 MMHG | BODY MASS INDEX: 29.19 KG/M2 | TEMPERATURE: 97.6 F | RESPIRATION RATE: 19 BRPM | DIASTOLIC BLOOD PRESSURE: 82 MMHG

## 2018-11-27 PROCEDURE — 76040000019: Performed by: INTERNAL MEDICINE

## 2018-11-27 PROCEDURE — 74011250636 HC RX REV CODE- 250/636: Performed by: INTERNAL MEDICINE

## 2018-11-27 PROCEDURE — 74011000258 HC RX REV CODE- 258: Performed by: INTERNAL MEDICINE

## 2018-11-27 PROCEDURE — 76060000031 HC ANESTHESIA FIRST 0.5 HR: Performed by: INTERNAL MEDICINE

## 2018-11-27 PROCEDURE — 74011250636 HC RX REV CODE- 250/636

## 2018-11-27 RX ORDER — ATROPINE SULFATE 0.1 MG/ML
0.4 INJECTION INTRAVENOUS
Status: DISCONTINUED | OUTPATIENT
Start: 2018-11-27 | End: 2018-11-27 | Stop reason: HOSPADM

## 2018-11-27 RX ORDER — FLUMAZENIL 0.1 MG/ML
0.2 INJECTION INTRAVENOUS
Status: DISCONTINUED | OUTPATIENT
Start: 2018-11-27 | End: 2018-11-27 | Stop reason: HOSPADM

## 2018-11-27 RX ORDER — SODIUM CHLORIDE 9 MG/ML
50 INJECTION, SOLUTION INTRAVENOUS CONTINUOUS
Status: DISCONTINUED | OUTPATIENT
Start: 2018-11-27 | End: 2018-11-27 | Stop reason: HOSPADM

## 2018-11-27 RX ORDER — SODIUM CHLORIDE 9 MG/ML
INJECTION, SOLUTION INTRAVENOUS
Status: DISCONTINUED | OUTPATIENT
Start: 2018-11-27 | End: 2018-11-27 | Stop reason: HOSPADM

## 2018-11-27 RX ORDER — PROPOFOL 10 MG/ML
INJECTION, EMULSION INTRAVENOUS AS NEEDED
Status: DISCONTINUED | OUTPATIENT
Start: 2018-11-27 | End: 2018-11-27 | Stop reason: HOSPADM

## 2018-11-27 RX ORDER — LIDOCAINE HYDROCHLORIDE 20 MG/ML
INJECTION, SOLUTION EPIDURAL; INFILTRATION; INTRACAUDAL; PERINEURAL AS NEEDED
Status: DISCONTINUED | OUTPATIENT
Start: 2018-11-27 | End: 2018-11-27 | Stop reason: HOSPADM

## 2018-11-27 RX ORDER — PROPOFOL 10 MG/ML
INJECTION, EMULSION INTRAVENOUS
Status: DISCONTINUED | OUTPATIENT
Start: 2018-11-27 | End: 2018-11-27 | Stop reason: HOSPADM

## 2018-11-27 RX ORDER — DEXTROMETHORPHAN/PSEUDOEPHED 2.5-7.5/.8
1.2 DROPS ORAL
Status: DISCONTINUED | OUTPATIENT
Start: 2018-11-27 | End: 2018-11-27 | Stop reason: HOSPADM

## 2018-11-27 RX ORDER — MIDAZOLAM HYDROCHLORIDE 1 MG/ML
.25-5 INJECTION, SOLUTION INTRAMUSCULAR; INTRAVENOUS
Status: DISCONTINUED | OUTPATIENT
Start: 2018-11-27 | End: 2018-11-27 | Stop reason: HOSPADM

## 2018-11-27 RX ORDER — EPINEPHRINE 0.1 MG/ML
1 INJECTION INTRACARDIAC; INTRAVENOUS
Status: DISCONTINUED | OUTPATIENT
Start: 2018-11-27 | End: 2018-11-27 | Stop reason: HOSPADM

## 2018-11-27 RX ORDER — NALOXONE HYDROCHLORIDE 0.4 MG/ML
0.4 INJECTION, SOLUTION INTRAMUSCULAR; INTRAVENOUS; SUBCUTANEOUS
Status: DISCONTINUED | OUTPATIENT
Start: 2018-11-27 | End: 2018-11-27 | Stop reason: HOSPADM

## 2018-11-27 RX ADMIN — SODIUM CHLORIDE: 9 INJECTION, SOLUTION INTRAVENOUS at 14:15

## 2018-11-27 RX ADMIN — PROPOFOL 140 MCG/KG/MIN: 10 INJECTION, EMULSION INTRAVENOUS at 15:28

## 2018-11-27 RX ADMIN — PROPOFOL 80 MG: 10 INJECTION, EMULSION INTRAVENOUS at 15:28

## 2018-11-27 RX ADMIN — LIDOCAINE HYDROCHLORIDE 40 MG: 20 INJECTION, SOLUTION EPIDURAL; INFILTRATION; INTRACAUDAL; PERINEURAL at 15:28

## 2018-11-27 NOTE — ROUTINE PROCESS
Warner Jj 1950 
[id number] Situation: 
Verbal report received from: Boni Galicia RN Procedure: Procedure(s): 
COLONOSCOPY Background: 
 
Preoperative diagnosis: PERSONAL HISTORY OF POLYPS Postoperative diagnosis: diverticulosis hemorrhoids :  Dr. Layton Bocanegra Assistant(s): Endoscopy Technician-1: Rohan Power Endoscopy RN-1: Nara Parks RN Specimens: * No specimens in log * H. Pylori  no Assessment: 
Intra-procedure medications Anesthesia gave intra-procedure sedation and medications, see anesthesia flow sheet yes Intravenous fluids: NS@ Ruthanna Jessamine Vital signs stable Abdominal assessment: round and soft Recommendation: 
Discharge patient per MD order. Family or Friend Permission to share finding with family or friend yes Endoscopy discharge instructions have been reviewed and given to patient and niece. The patient and niece verbalized understanding and acceptance of instructions.

## 2018-11-27 NOTE — DISCHARGE INSTRUCTIONS
[facility]. Brendan Barrett M.D.  [phone]            COLON DISCHARGE INSTRUCTIONS       2018    Marta Blanco  :  1950  [facility] Medical Record Number:  [id number]      COLONOSCOPY FINDINGS:  Your colonoscopy showed diverticulosis and small internal hemorrhoids, otherwise looked within normal.    DISCOMFORT:  Redness at IV site- apply warm compress to area; if redness or soreness persist- contact your physician  There may be a slight amount of blood passed from the rectum  Gaseous discomfort- walking, belching will help relieve any discomfort  You may not operate a vehicle for 12 hours  You may not engage in an occupation involving machinery or appliances for rest of today  You may not drink alcoholic beverages for at least 12 hours  Avoid making any critical decisions for at least 24 hour  DIET:   High fiber diet. - however -  remember your colon is empty and a heavy meal will produce gas. Avoid these foods:  vegetables, fried / greasy foods, carbonated drinks for today     ACTIVITY:  You may resume your normal daily activities it is recommended that you spend the remainder of the day resting -  avoid any strenuous activity. CALL M.D. ANY SIGN OF:   Increasing pain, nausea, vomiting  Abdominal distension (swelling)  New increased bleeding (oral or rectal)  Fever (chills)  Pain in chest area  Bloody discharge from nose or mouth   Shortness of breath    Follow-up Instructions:   Call Dr. Cierra Yen if any questions or problems. Telephone # [phone]  High fiber diet  Should have a repeat colonoscopy in 5 years.

## 2018-11-27 NOTE — PROCEDURES
Jerilyn Giles M.D.  [phone]            2018          Colonoscopy Operative Report  Radha Cavazos  :  1950  [facility] Medical Record Number:  [id number]      Indications:    Personal history of colon polyps (screening only)     :  Maikol Benedict MD    Referring Provider: Al Ha MD    Sedation:  MAC anesthesia    Pre-Procedural Exam:      Airway: clear,  No airway problems anticipated  Heart: RRR, without gallops or rubs  Lungs: clear bilaterally without wheezes, crackles, or rhonchi  Abdomen: soft, nontender, nondistended, bowel sounds present  Mental Status: awake, alert and oriented to person, place and time     Procedure Details:  After informed consent was obtained with all risks and benefits of procedure explained and preoperative exam completed, the patient was taken to the endoscopy suite and placed in the left lateral decubitus position. Upon sequential sedation as per above, a digital rectal exam was performed. The Olympus videocolonoscope  was inserted in the rectum and carefully advanced to the cecum, which was identified by the ileocecal valve and appendiceal orifice. The quality of preparation was good. The colonoscope was slowly withdrawn with careful inspection and evaluation between folds. Retroflexion in the rectum was performed. Findings:   Terminal Ileum: not intubated  Cecum: normal  Ascending Colon: normal  Transverse Colon: normal  Descending Colon: normal  Sigmoid: no mucosal lesion appreciated  moderate diverticulosis; Rectum: no mucosal lesion appreciated  Grade 1 internal hemorrhoid(s); Interventions:  none    Specimen Removed:  none    Complications: None. EBL:  None. Impression:  Sigmoid diverticulosis and small internal hemorrhoids, otherwise mucosa within normal throughout the colon. Recommendations:  -Repeat colonoscopy in 5 years.   -High fiber diet.    -Resume normal medication(s). Discharge Disposition:  Home in the company of a  when able to ambulate.     Merlinda Rua, MD  11/27/2018  3:44 PM

## 2018-11-27 NOTE — PERIOP NOTES
Patient tolerated procedure without problems. Abdomen soft and patient arousable and voices no complaints Report received from CRNA, see anesthesia note. Patient transported to endoscopy recovery area via stretcher and verbal bedside report given to Margoth Chiang RN.

## 2018-11-27 NOTE — ANESTHESIA POSTPROCEDURE EVALUATION
Procedure(s): 
COLONOSCOPY. Anesthesia Post Evaluation Patient location during evaluation: bedside Level of consciousness: awake Pain management: satisfactory to patient Airway patency: patent Anesthetic complications: no 
Cardiovascular status: acceptable Respiratory status: acceptable Hydration status: acceptable Visit Vitals /89 Pulse 74 Temp 37.1 °C (98.7 °F) Resp 17 Ht 5' 4\" (1.626 m) Wt 77.6 kg (171 lb) SpO2 100% Breastfeeding? No  
BMI 29.35 kg/m²

## 2018-11-27 NOTE — H&P
Emy Poon M.D. 
[phone] History and Physical    
 
NAME:  Melissa Vu :   1950 MRN:   [id number] Referring Physician:  Dr. Lucina Banegas Consult Date: 2018 2:18 PM 
 
Chief Complaint:  Colon cancer screening History of Present Illness:  Patient is a 77 y.o. who is seen for colon cancer screening and colon polyp surveillance. Denies any ongoing GI complaints. PMH: 
Past Medical History:  
Diagnosis Date  Arthritis IN HIPS - resolved since replacement  [facility] tumor ([facility] Pt being monitored by Dr Bolivar Palacios, meningioma  Environmental allergies  GERD (gastroesophageal reflux disease)  High cholesterol  Hypertension  Migraine \"AFFECTS EYES\"; EPISODES START WITH EAR FULLNESS SENSATION  
 S/P chemotherapy, time since greater than 12 weeks   Uterine cancer ([facility]   
  was seen by Dr. Betty Elizabeth  Vertigo or labyrinthine disorder 10/31/2011 ENT shunt placed on left side behind ear PSH: 
Past Surgical History:  
Procedure Laterality Date  ENDOSCOPY, COLON, DIAGNOSTIC  3/12/2010 ([place])-f/u 5 yrs. [address] [facility]  HX HEENT  2012  
 shunt placed behind left ear to help with vertigo [facility] HIP REPLACEMENT Right [address] [facility] Pawel Barrera WITH BSO  HX ORTHOPAEDIC    
 right CHEPE Allergies: 
No Known Allergies Home Medications: 
Prior to Admission Medications Prescriptions Last Dose Informant Patient Reported? Taking? cyanocobalamin, vitamin B-12, (VITAMIN B-12 PO) 2018 at am  Yes Yes Sig: Take  by mouth. folic acid/multivit-min/lutein (CENTRUM SILVER PO) 2018 at am  Yes Yes Sig: Take  by mouth. naproxen sodium (ALEVE) 220 mg cap 2018 at pm  Yes Yes Sig: Take  by mouth.  
pantoprazole (PROTONIX) 40 mg tablet 2018 at am  No Yes Sig: Take 1 Tab by mouth daily. potassium 99 mg tablet 10/27/2018 at Unknown time  Yes Yes Sig: Take 99 mg by mouth daily. triamterene-hydroCHLOROthiazide (MAXZIDE) 37.5-25 mg per tablet 2018 at 0805  No Yes Sig: take 1 tablet by mouth once daily  
verapamil (CALAN) 40 mg tablet 2018 at [facility]  Yes Yes Sig: Take  by mouth two (2) times a day. Indications: FOR VERTIGO Facility-Administered Medications: None Hospital Medications: No current facility-administered medications for this encounter. Social History: 
Social History Tobacco Use  Smoking status: Former Smoker Packs/day: 0.50 Years: 20.00 Pack years: 10.00 Types: Cigarettes Last attempt to quit: 1994 Years since quittin.9  Smokeless tobacco: Never Used  Tobacco comment: not anymore Substance Use Topics  Alcohol use: No  
  Alcohol/week: 0.0 oz Family History: 
Family History Problem Relation Age of Onset  Cancer Mother   
     colon  Stroke Mother  Heart Disease Mother  Diabetes Father  Cancer Father   
     kidney  Diabetes Sister  Elevated Lipids Sister  Heart Disease Sister  Hypertension Sister  Breast Cancer Sister 61  
 Heart Disease Brother  Breast Cancer Other  Breast Cancer Other  Anesth Problems Neg Hx Review of Systems: 
 
 
Constitutional: negative fever, negative chills, negative weight loss Eyes:   negative visual changes ENT:   negative sore throat, tongue or lip swelling Respiratory:  negative cough, negative dyspnea Cards:  negative for chest pain, palpitations, lower extremity edema GI:   See HPI 
:  negative for frequency, dysuria Integument:  negative for rash and pruritus Heme:  negative for easy bruising and gum/nose bleeding Musculoskel: negative for myalgias,  back pain and muscle weakness Neuro: negative for headaches, dizziness, vertigo Psych:  negative for feelings of anxiety, depression Objective:  
 
Patient Vitals for the past 8 hrs: 
 BP Temp Pulse Resp SpO2 Height Weight 11/27/18 1329 146/89 98.7 °F (37.1 °C) 74 17 100 % 5' 4\" (1.626 m) 77.6 kg (171 lb) No intake/output data recorded. No intake/output data recorded. EXAM:   
 NEURO-a&o HEENT-wnl LUNGS-clear COR-regular rate and rhythym ABD-soft , no tenderness, no rebound, good bs EXT-no edema Data Review No results for input(s): WBC, HGB, HCT, PLT, HGBEXT, HCTEXT, PLTEXT in the last 72 hours. No results for input(s): NA, K, CL, CO2, BUN, CREA, GLU, PHOS, CA in the last 72 hours. No results for input(s): SGOT, GPT, AP, TBIL, TP, ALB, GLOB, GGT, AML, LPSE in the last 72 hours. No lab exists for component: AMYP, HLPSE No results for input(s): INR, PTP, APTT in the last 72 hours. No lab exists for component: INREXT Patient Active Problem List  
Diagnosis Code  
 HTN (hypertension) I10  
 Hyperlipemia E78.5  S/P WILFRID-BSO Z90.710, Z90.722, Z90.79  Gastric ulcer, unspecified as acute or chronic, without mention of hemorrhage, perforation, or obstruction K25.9  Vertigo or labyrinthine disorder H81.90  Pap smear for cervical cancer screening Z12.4  
 Hx of screening mammography Z92.89  Screening for colon cancer Z12.11  
 Hypertension I10  Migraine G43.909  GERD (gastroesophageal reflux disease) K21.9  Advanced care planning/counseling discussion Z71.89 Assessment:  
· Colon cancer screening and colon polyp surveillance Plan:  
· Colonoscopy today.   
 
Signed By: Antoine Paez MD   
 11/27/2018  2:18 PM

## 2018-11-27 NOTE — ANESTHESIA PREPROCEDURE EVALUATION
Anesthetic History Review of Systems / Medical History Pulmonary Neuro/Psych Cardiovascular Hypertension GI/Hepatic/Renal 
  
GERD 
 
 
PUD Endo/Other Arthritis Other Findings Physical Exam 
 
Airway Mallampati: II 
 
Neck ROM: normal range of motion Mouth opening: Normal 
 
 Cardiovascular Rhythm: regular Rate: normal 
 
 
 
 Dental 
No notable dental hx Pulmonary Breath sounds clear to auscultation Abdominal 
GI exam deferred Other Findings Anesthetic Plan ASA: 2 Anesthesia type: MAC Induction: Intravenous Anesthetic plan and risks discussed with: Patient

## 2018-11-27 NOTE — PERIOP NOTES
TRANSFER - IN REPORT: 
 
Verbal report received from [address] on [facility]  being received from [facility] for ordered procedure Report consisted of patients Situation, Background, Assessment and  
Recommendations(SBAR). Information from the following report(s) Intake/Output and MAR was reviewed with the receiving nurse. Opportunity for questions and clarification was provided. Assessment completed upon patients arrival to unit and care assumed.

## 2018-12-24 NOTE — TELEPHONE ENCOUNTER
----- Message from Twyla Madera sent at 12/24/2018  9:22 AM EST -----  Regarding: Dr. Francis Appl: [phone]  Pt is requesting to refill on Triamterene-hctz 37.5/25mg and uses the [employer] on file. Best contact [phone].

## 2018-12-26 RX ORDER — TRIAMTERENE/HYDROCHLOROTHIAZID 37.5-25 MG
TABLET ORAL
Qty: 90 TAB | Refills: 0 | Status: SHIPPED | OUTPATIENT
Start: 2018-12-26 | End: 2019-03-27 | Stop reason: SDUPTHER

## 2019-04-25 RX ORDER — TRIAMTERENE/HYDROCHLOROTHIAZID 37.5-25 MG
TABLET ORAL
Qty: 30 TAB | Refills: 0 | Status: SHIPPED | OUTPATIENT
Start: 2019-04-25 | End: 2019-04-30 | Stop reason: SDUPTHER

## 2019-04-25 RX ORDER — TRIAMTERENE/HYDROCHLOROTHIAZID 37.5-25 MG
TABLET ORAL
Qty: 30 TAB | Refills: 0 | Status: SHIPPED | OUTPATIENT
Start: 2019-04-25 | End: 2019-04-25 | Stop reason: SDUPTHER

## 2019-04-25 NOTE — TELEPHONE ENCOUNTER
Patient called requesting that Maxzide 37.5-25 mg tablet be refilled. Patient reports only having 2 pills left.           [address] [phone]

## 2019-04-30 ENCOUNTER — OFFICE VISIT (OUTPATIENT)
Dept: INTERNAL MEDICINE CLINIC | Age: 69
End: 2019-04-30

## 2019-04-30 VITALS
WEIGHT: 174 LBS | DIASTOLIC BLOOD PRESSURE: 82 MMHG | OXYGEN SATURATION: 96 % | HEIGHT: 64 IN | HEART RATE: 66 BPM | SYSTOLIC BLOOD PRESSURE: 120 MMHG | TEMPERATURE: 97.4 F | BODY MASS INDEX: 29.71 KG/M2 | RESPIRATION RATE: 12 BRPM

## 2019-04-30 DIAGNOSIS — R73.09 ELEVATED GLUCOSE: Primary | ICD-10-CM

## 2019-04-30 DIAGNOSIS — I10 ESSENTIAL HYPERTENSION: ICD-10-CM

## 2019-04-30 DIAGNOSIS — E78.2 MIXED HYPERLIPIDEMIA: ICD-10-CM

## 2019-04-30 DIAGNOSIS — Z12.39 BREAST CANCER SCREENING: ICD-10-CM

## 2019-04-30 RX ORDER — TRIAMTERENE/HYDROCHLOROTHIAZID 37.5-25 MG
1 TABLET ORAL DAILY
Qty: 90 TAB | Refills: 0 | Status: SHIPPED | OUTPATIENT
Start: 2019-04-30 | End: 2019-08-15 | Stop reason: SDUPTHER

## 2019-04-30 NOTE — PROGRESS NOTES
Chief Complaint Patient presents with  Medication Refill Patient presents for follow-up on her blood pressure and cholesterol. She was last seen in August 2018. cholsterol Eating heart healthy, Not eating fast food Patient is not taking any pravastatin. She was on simvastatin but we stopped that due to the interaction with verapamil. Patient reports she still did not start pravastatin. Your LDL from prior lab was reviewed with her. No chest pain or shortness of breath. Subjective:  
Franki Anaya is a 68 y.o. female with hypertension. Hypertension ROS: taking medications as instructed, no medication side effects noted, no TIA's, no chest pain on exertion, no dyspnea on exertion, no swelling of ankles. New concerns: none Migraines Patient is on verapamil for her vertigo and migraines. She reports she has not had any symptoms since last visit. Past Medical History:  
Diagnosis Date  Arthritis IN HIPS - resolved since replacement  Brain tumor ([facility] [address] Pt being monitored by Dr Brianna Levine, meningioma  Environmental allergies  GERD (gastroesophageal reflux disease)  High cholesterol  Hypertension  Migraine \"AFFECTS EYES\"; EPISODES START WITH EAR FULLNESS SENSATION  
 S/P chemotherapy, time since greater than 12 weeks 1996  Uterine cancer ([facility] [address] 1996  
  was seen by Dr. Fredrick Muir  Vertigo or labyrinthine disorder 10/31/2011 ENT shunt placed on left side behind ear Past Surgical History:  
Procedure Laterality Date  COLONOSCOPY N/A 11/27/2018 COLONOSCOPY performed by Dae Hurst MD at [address] [place] [address]  ENDOSCOPY, COLON, DIAGNOSTIC  3/12/2010 ([place])-f/u 5 yrs. [facility]  HX HEENT  2012  
 shunt placed behind left ear to help with vertigo [facility] HIP REPLACEMENT Right 2017 59 Located within [facility] WITH BSO  HX ORTHOPAEDIC    
 right CHEPE Social History Socioeconomic History  Marital status:  Spouse name: Not on file  Number of children: Not on file  Years of education: Not on file  Highest education level: Not on file Tobacco Use  Smoking status: Former Smoker Packs/day: 0.50 Years: 20.00 Pack years: 10.00 Types: Cigarettes Last attempt to quit: 1994 Years since quittin.3  Smokeless tobacco: Never Used  Tobacco comment: not anymore Substance and Sexual Activity  Alcohol use: No  
  Alcohol/week: 0.0 oz  Drug use: No  
 Sexual activity: Not Currently Comment: no  
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  
   
 2 daughters both healthy Family History Problem Relation Age of Onset  Cancer Mother   
     colon  Stroke Mother  Heart Disease Mother  Diabetes Father  Cancer Father   
     kidney  Diabetes Sister  Elevated Lipids Sister  Heart Disease Sister  Hypertension Sister  Breast Cancer Sister 61  
 Heart Disease Brother  Breast Cancer Other  Breast Cancer Other  Anesth Problems Neg Hx Current Outpatient Medications Medication Sig Dispense Refill  triamterene-hydroCHLOROthiazide (MAXZIDE) 37.5-25 mg per tablet Take 1 Tab by mouth daily. 90 Tab 0  
 naproxen sodium (ALEVE) 220 mg cap Take  by mouth.  folic acid/multivit-min/lutein (CENTRUM SILVER PO) Take  by mouth.  cyanocobalamin, vitamin B-12, (VITAMIN B-12 PO) Take  by mouth.  pantoprazole (PROTONIX) 40 mg tablet Take 1 Tab by mouth daily. 90 Tab 0  
 verapamil (CALAN) 40 mg tablet Take  by mouth two (2) times a day. Indications: FOR VERTIGO  potassium 99 mg tablet Take 99 mg by mouth daily. No Known Allergies Review of Systems - General ROS: negative for - chills, fatigue, fever, hot flashes, malaise or night sweats Cardiovascular ROS: no chest pain or dyspnea on exertion Respiratory ROS: no cough, shortness of breath, or wheezing Visit Vitals /82 (BP 1 Location: Left arm, BP Patient Position: Sitting) Pulse 66 Temp 97.4 °F (36.3 °C) (Oral) Resp 12 Ht 5' 4\" (1.626 m) Wt 174 lb (78.9 kg) SpO2 96% BMI 29.87 kg/m² General Appearance:  Well developed, well nourished,alert and oriented x 3, and individual in no acute distress. Ears/Nose/Mouth/Throat:   Hearing grossly normal.enlarged right nasal turbinate Neck: Supple, no lad, no bruits Chest:   Lungs clear to auscultation bilaterally. Cardiovascular:  Regular rate and rhythm, S1, S2 normal, no murmur. Abdomen:   Soft, epigastric pain on palpation, bowel sounds are active. Extremities: No edema bilaterally. Pain on palpation of IT band and posterior knee and hamstring area, neg straight leg raise Skin: Warm and dry, no suspicious lesions Diagnoses and all orders for this visit: 1. Elevated glucose Labs reviewed with patient. 
-     HEMOGLOBIN A1C WITH EAG 2. Mixed hyperlipidemia Labs reviewed with patient. She has not been compliant with her pravastatin so we will not do labs today. She reports she will be compliant and start. She does not need a prescription for pravastatin she reports she has plenty at home -     MICROALBUMIN, UR, RAND W/ MICROALB/CREAT RATIO 
-     METABOLIC PANEL, COMPREHENSIVE 3. Breast cancer screening -     [facility] 3D HOLLIE W MAMMO BI SCREENING INCL CAD; Future Hypertension stable Continue 
-     triamterene-hydroCHLOROthiazide (MAXZIDE) 37.5-25 mg per tablet; Take 1 Tab by mouth daily.

## 2019-05-01 LAB
ALBUMIN SERPL-MCNC: 4.7 G/DL (ref 3.5–4.8)
ALBUMIN/CREAT UR: 7.6 MG/G CREAT (ref 0–30)
ALBUMIN/GLOB SERPL: 1.6 {RATIO} (ref 1.2–2.2)
ALP SERPL-CCNC: 60 IU/L (ref 39–117)
ALT SERPL-CCNC: 21 IU/L (ref 0–32)
AST SERPL-CCNC: 21 IU/L (ref 0–40)
BILIRUB SERPL-MCNC: 0.4 MG/DL (ref 0–1.2)
BUN SERPL-MCNC: 16 MG/DL (ref 8–27)
BUN/CREAT SERPL: 16 (ref 12–28)
CALCIUM SERPL-MCNC: 9.7 MG/DL (ref 8.7–10.3)
CHLORIDE SERPL-SCNC: 102 MMOL/L (ref 96–106)
CO2 SERPL-SCNC: 21 MMOL/L (ref 20–29)
CREAT SERPL-MCNC: 0.98 MG/DL (ref 0.57–1)
CREAT UR-MCNC: 87.9 MG/DL
EST. AVERAGE GLUCOSE BLD GHB EST-MCNC: 137 MG/DL
GLOBULIN SER CALC-MCNC: 2.9 G/DL (ref 1.5–4.5)
GLUCOSE SERPL-MCNC: 81 MG/DL (ref 65–99)
HBA1C MFR BLD: 6.4 % (ref 4.8–5.6)
INTERPRETATION: NORMAL
MICROALBUMIN UR-MCNC: 6.7 UG/ML
POTASSIUM SERPL-SCNC: 3.7 MMOL/L (ref 3.5–5.2)
PROT SERPL-MCNC: 7.6 G/DL (ref 6–8.5)
SODIUM SERPL-SCNC: 141 MMOL/L (ref 134–144)

## 2019-06-04 ENCOUNTER — TELEPHONE (OUTPATIENT)
Dept: INTERNAL MEDICINE CLINIC | Age: 69
End: 2019-06-04

## 2019-06-04 NOTE — TELEPHONE ENCOUNTER
----- Message from Braulio Thomas sent at 6/4/2019 10:56 AM EDT -----  Regarding: Dr. Dianne Bradshaw  Patient still has a virus and is taking the medicine Doxycycline that you prescribed but it is now affecting her sciatic nerve. Please call her and advise what she can take. Her number is [phone].

## 2019-06-04 NOTE — TELEPHONE ENCOUNTER
----- Message from Milo Arias sent at 6/4/2019 12:19 PM EDT -----  Regarding: Dr. Sandra Huertas would like a call back regarding being seen today. She stated she didn't think she could make it in earlier today but now she is able to make it in today if she is still able to be seen. Pt's bets contact is [phone].

## 2019-06-04 NOTE — TELEPHONE ENCOUNTER
Returned call to pt. She states she has been having a sore throat, congestion, cough, and weakness since Friday. She believes she has a virus from her grandchildren. She denies fever and chills. She has has been taking lewis seltzer plus, Robitussin, and Doxycycline she found in her cabinet for several days with no improvement. She states she has been so weak she has a hard time driving home from the bank yesterday. I advised her that I can scheduled her for an appt in the office or [facility] can come to her home to evaluate her today. Pt states she would like to have [facility] come to evaluate her. Information given to pt to call them to schedule them to come. Pt thanks and disconnects the call.

## 2019-06-04 NOTE — TELEPHONE ENCOUNTER
Returned call to pt, she states she is feeling better and does not want to have [facility] come today.  I have scheduled her for an appt in the office Thursday at 1:40pm. Pt was thankful and disconnects the call

## 2019-07-01 ENCOUNTER — HOSPITAL ENCOUNTER (OUTPATIENT)
Dept: MAMMOGRAPHY | Age: 69
Discharge: HOME | End: 2019-07-01
Attending: INTERNAL MEDICINE
Payer: MEDICARE

## 2019-07-01 DIAGNOSIS — Z12.39 BREAST CANCER SCREENING: ICD-10-CM

## 2019-07-01 PROCEDURE — 77063 BREAST TOMOSYNTHESIS BI: CPT

## 2019-08-15 DIAGNOSIS — I10 ESSENTIAL HYPERTENSION: Primary | ICD-10-CM

## 2019-08-15 NOTE — TELEPHONE ENCOUNTER
Ellena Olszewski [facility] [employer]   Phone Number: [phone]             Pt requesting refill for \"Triamterene\" to be sent to [facility] at [address] [facility]. Best Contact [phone].

## 2019-08-16 RX ORDER — TRIAMTERENE/HYDROCHLOROTHIAZID 37.5-25 MG
1 TABLET ORAL DAILY
Qty: 90 TAB | Refills: 0 | Status: SHIPPED | OUTPATIENT
Start: 2019-08-16 | End: 2019-11-27 | Stop reason: SDUPTHER

## 2019-09-03 RX ORDER — VERAPAMIL HYDROCHLORIDE 40 MG/1
40 TABLET ORAL 2 TIMES DAILY
Qty: 60 TAB | Refills: 0 | Status: SHIPPED | OUTPATIENT
Start: 2019-09-03 | End: 2019-10-07 | Stop reason: SDUPTHER

## 2019-09-03 NOTE — TELEPHONE ENCOUNTER
Pt requesting refill on Verapamil with a 90 day supply to [facility]. Mentioned she has only 2 pills left. Thanks.

## 2019-10-07 RX ORDER — VERAPAMIL HYDROCHLORIDE 40 MG/1
40 TABLET ORAL 2 TIMES DAILY
Qty: 60 TAB | Refills: 0 | Status: SHIPPED | OUTPATIENT
Start: 2019-10-07 | End: 2019-11-27 | Stop reason: SDUPTHER

## 2019-10-07 NOTE — TELEPHONE ENCOUNTER
Patient called to request a refill for her Calan 40 MG Tablet 2 times a day.      [facility] [phone]

## 2019-11-27 DIAGNOSIS — I10 ESSENTIAL HYPERTENSION: ICD-10-CM

## 2019-11-27 RX ORDER — VERAPAMIL HYDROCHLORIDE 40 MG/1
40 TABLET ORAL 2 TIMES DAILY
Qty: 60 TAB | Refills: 0 | Status: SHIPPED | OUTPATIENT
Start: 2019-11-27 | End: 2019-12-16

## 2019-11-27 RX ORDER — TRIAMTERENE/HYDROCHLOROTHIAZID 37.5-25 MG
1 TABLET ORAL DAILY
Qty: 90 TAB | Refills: 0 | Status: SHIPPED | OUTPATIENT
Start: 2019-11-27 | End: 2020-02-25 | Stop reason: SDUPTHER

## 2019-11-27 NOTE — TELEPHONE ENCOUNTER
Cleora Romberg D P [facility] Office Pool             Medication Refill     Caller (if not patient):       Relationship of caller (if not patient):       Best contact number(s):[phone]   Name of medication and dosage if known:       Is patient out of this medication (yes/no): yes       Pharmacy name: Rene Samson listed in chart? (yes/no): yes [phone]   Pharmacy phone number:       Details to clarify the request: Patient is requesting a refill of Verapamil and Triamterene sent to [facility]

## 2019-12-16 ENCOUNTER — OFFICE VISIT (OUTPATIENT)
Dept: INTERNAL MEDICINE CLINIC | Age: 69
End: 2019-12-16

## 2019-12-16 ENCOUNTER — HOSPITAL ENCOUNTER (OUTPATIENT)
Dept: LAB | Age: 69
Discharge: HOME | End: 2019-12-16

## 2019-12-16 VITALS
OXYGEN SATURATION: 96 % | TEMPERATURE: 97.5 F | BODY MASS INDEX: 29.91 KG/M2 | RESPIRATION RATE: 12 BRPM | WEIGHT: 175.2 LBS | DIASTOLIC BLOOD PRESSURE: 84 MMHG | HEIGHT: 64 IN | HEART RATE: 79 BPM | SYSTOLIC BLOOD PRESSURE: 123 MMHG

## 2019-12-16 DIAGNOSIS — E78.2 MIXED HYPERLIPIDEMIA: ICD-10-CM

## 2019-12-16 DIAGNOSIS — R42 VERTIGO: Primary | ICD-10-CM

## 2019-12-16 DIAGNOSIS — G89.29 CHRONIC RIGHT SHOULDER PAIN: ICD-10-CM

## 2019-12-16 DIAGNOSIS — M25.511 CHRONIC RIGHT SHOULDER PAIN: ICD-10-CM

## 2019-12-16 DIAGNOSIS — Z23 IMMUNIZATION DUE: ICD-10-CM

## 2019-12-16 DIAGNOSIS — I10 ESSENTIAL HYPERTENSION: ICD-10-CM

## 2019-12-16 DIAGNOSIS — R73.09 ELEVATED GLUCOSE: ICD-10-CM

## 2019-12-16 LAB
ALBUMIN SERPL-MCNC: 3.8 G/DL (ref 3.5–5)
ALBUMIN/GLOB SERPL: 1 {RATIO} (ref 1.1–2.2)
ALP SERPL-CCNC: 61 U/L (ref 45–117)
ALT SERPL-CCNC: 29 U/L (ref 12–78)
ANION GAP SERPL CALC-SCNC: 6 MMOL/L (ref 5–15)
AST SERPL-CCNC: 15 U/L (ref 15–37)
BILIRUB SERPL-MCNC: 0.3 MG/DL (ref 0.2–1)
BUN SERPL-MCNC: 19 MG/DL (ref 6–20)
BUN/CREAT SERPL: 20 (ref 12–20)
CALCIUM SERPL-MCNC: 9.9 MG/DL (ref 8.5–10.1)
CHLORIDE SERPL-SCNC: 107 MMOL/L (ref 97–108)
CHOLEST SERPL-MCNC: 233 MG/DL
CO2 SERPL-SCNC: 25 MMOL/L (ref 21–32)
CREAT SERPL-MCNC: 0.95 MG/DL (ref 0.55–1.02)
EST. AVERAGE GLUCOSE BLD GHB EST-MCNC: 143 MG/DL
GLOBULIN SER CALC-MCNC: 4 G/DL (ref 2–4)
GLUCOSE SERPL-MCNC: 113 MG/DL (ref 65–100)
HBA1C MFR BLD: 6.6 % (ref 4–5.6)
HDLC SERPL-MCNC: 57 MG/DL
HDLC SERPL: 4.1 {RATIO} (ref 0–5)
LDLC SERPL CALC-MCNC: 153 MG/DL (ref 0–100)
LIPID PROFILE,FLP: ABNORMAL
POTASSIUM SERPL-SCNC: 3.8 MMOL/L (ref 3.5–5.1)
PROT SERPL-MCNC: 7.8 G/DL (ref 6.4–8.2)
SODIUM SERPL-SCNC: 138 MMOL/L (ref 136–145)
TRIGL SERPL-MCNC: 115 MG/DL (ref ?–150)
VLDLC SERPL CALC-MCNC: 23 MG/DL

## 2019-12-16 RX ORDER — PRAVASTATIN SODIUM 20 MG/1
20 TABLET ORAL
Qty: 90 TAB | Refills: 0 | Status: SHIPPED | OUTPATIENT
Start: 2019-12-16 | End: 2020-04-06 | Stop reason: SDUPTHER

## 2019-12-16 RX ORDER — VERAPAMIL HYDROCHLORIDE 40 MG/1
40 TABLET ORAL DAILY
Qty: 60 TAB | Refills: 0 | Status: SHIPPED | OUTPATIENT
Start: 2019-12-16 | End: 2020-04-02

## 2019-12-16 NOTE — PATIENT INSTRUCTIONS
DASH Diet: Care Instructions Your Care Instructions The DASH diet is an eating plan that can help lower your blood pressure. DASH stands for Dietary Approaches to Stop Hypertension. Hypertension is high blood pressure. The DASH diet focuses on eating foods that are high in calcium, potassium, and magnesium. These nutrients can lower blood pressure. The foods that are highest in these nutrients are fruits, vegetables, low-fat dairy products, nuts, seeds, and legumes. But taking calcium, potassium, and magnesium supplements instead of eating foods that are high in those nutrients does not have the same effect. The DASH diet also includes whole grains, fish, and poultry. The DASH diet is one of several lifestyle changes your doctor may recommend to lower your high blood pressure. Your doctor may also want you to decrease the amount of sodium in your diet. Lowering sodium while following the DASH diet can lower blood pressure even further than just the DASH diet alone. Follow-up care is a key part of your treatment and safety. Be sure to make and go to all appointments, and call your doctor if you are having problems. It's also a good idea to know your test results and keep a list of the medicines you take. How can you care for yourself at home? Following the DASH diet · Eat 4 to 5 servings of fruit each day. A serving is 1 medium-sized piece of fruit, ½ cup chopped or canned fruit, 1/4 cup dried fruit, or 4 ounces (½ cup) of fruit juice. Choose fruit more often than fruit juice. · Eat 4 to 5 servings of vegetables each day. A serving is 1 cup of lettuce or raw leafy vegetables, ½ cup of chopped or cooked vegetables, or 4 ounces (½ cup) of vegetable juice. Choose vegetables more often than vegetable juice. · Get 2 to 3 servings of low-fat and fat-free dairy each day. A serving is 8 ounces of milk, 1 cup of yogurt, or 1 ½ ounces of cheese. · Eat 6 to 8 servings of grains each day. A serving is 1 slice of bread, 1 ounce of dry cereal, or ½ cup of cooked rice, pasta, or cooked cereal. Try to choose whole-grain products as much as possible. · Limit lean meat, poultry, and fish to 2 servings each day. A serving is 3 ounces, about the size of a deck of cards. · Eat 4 to 5 servings of nuts, seeds, and legumes (cooked dried beans, lentils, and split peas) each week. A serving is 1/3 cup of nuts, 2 tablespoons of seeds, or ½ cup of cooked beans or peas. · Limit fats and oils to 2 to 3 servings each day. A serving is 1 teaspoon of vegetable oil or 2 tablespoons of salad dressing. · Limit sweets and added sugars to 5 servings or less a week. A serving is 1 tablespoon jelly or jam, ½ cup sorbet, or 1 cup of lemonade. · Eat less than 2,300 milligrams (mg) of sodium a day. If you limit your sodium to 1,500 mg a day, you can lower your blood pressure even more. Tips for success · Start small. Do not try to make dramatic changes to your diet all at once. You might feel that you are missing out on your favorite foods and then be more likely to not follow the plan. Make small changes, and stick with them. Once those changes become habit, add a few more changes. · Try some of the following: ? Make it a goal to eat a fruit or vegetable at every meal and at snacks. This will make it easy to get the recommended amount of fruits and vegetables each day. ? Try yogurt topped with fruit and nuts for a snack or healthy dessert. ? Add lettuce, tomato, cucumber, and onion to sandwiches. ? Combine a ready-made pizza crust with low-fat mozzarella cheese and lots of vegetable toppings. Try using tomatoes, squash, spinach, broccoli, carrots, cauliflower, and onions. ? Have a variety of cut-up vegetables with a low-fat dip as an appetizer instead of chips and dip. ? Sprinkle sunflower seeds or chopped almonds over salads.  Or try adding chopped walnuts or almonds to cooked vegetables. ? Try some vegetarian meals using beans and peas. Add garbanzo or kidney beans to salads. Make burritos and tacos with mashed nixon beans or black beans. Where can you learn more? Go to http://john paul-cirilo.info/. Enter S302 in the search box to learn more about \"DASH Diet: Care Instructions. \" Current as of: April 9, 2019 Content Version: 12.2 © 3002-6998 [employer]. Care instructions adapted under license by [employer] (which disclaims liability or warranty for this information). If you have questions about a medical condition or this instruction, always ask your healthcare professional. [address] any warranty or liability for your use of this information. Shoulder Bursitis: Care Instructions Your Care Instructions Bursitis is inflammation of the bursa. A bursa is a small sac of fluid that cushions a joint and helps it move easily. A bursa sits under the highest point of your shoulder. You can get bursitis by overusing your shoulder, which can happen with activities such as lifting, pitching a ball, or painting. Symptoms of bursitis include pain when you move your arm. Your arm may hurt when you try to lift it, and the pain can reach down the side of your arm. You may have trouble sleeping because of the pain. Bursitis usually gets better if you avoid the activity that caused it. If pain lasts or gets worse despite home treatment, your doctor may draw fluid from the bursa through a needle. This may relieve your pain and help your doctor know if you have an infection. If so, your doctor will prescribe antibiotics. If you have inflammation only, you may get a corticosteroid shot to reduce swelling and pain. Sometimes surgery is needed to drain or remove the bursa. Follow-up care is a key part of your treatment and safety.  Be sure to make and go to all appointments, and call your doctor if you are having problems. It's also a good idea to know your test results and keep a list of the medicines you take. How can you care for yourself at home? · Put ice or a cold pack on your shoulder for 10 to 20 minutes at a time. Put a thin cloth between the ice and your skin. · After 3 days of using ice, use heat on your shoulder. You can use a hot water bottle, a heating pad set on low, or a warm, moist towel. Some doctors suggest alternating between hot and cold. · Rest your shoulder. Stop any activities that cause pain. Switch to activities that do not stress your shoulder. · Take your medicines exactly as prescribed. Call your doctor if you think you are having a problem with your medicine. · If your doctor recommends it, take anti-inflammatory medicines to reduce pain. These include ibuprofen (Advil, Motrin) and naproxen (Aleve). Read and follow all instructions on the label. · To prevent stiffness, gently move the shoulder joint through its full range of motion. As the pain gets better, keep doing range-of-motion exercises. Ask your doctor for exercises that will make the muscles around the shoulder joint stronger. Do these as directed. · You can slowly return to the activity that caused the pain, but do it with less effort until you can do it without pain or swelling. Be sure to warm up before and stretch after you do the activity. When should you call for help? Call your doctor now or seek immediate medical care if: 
  · You have a fever.  
  · You have increased swelling or redness in your shoulder.  
  · You cannot use your shoulder, or the pain in your shoulder gets worse.  
 Watch closely for changes in your health, and be sure to contact your doctor if: 
  · You have pain for 2 weeks or longer despite home treatment. Where can you learn more? Go to http://john paul-cirilo.info/. Enter M955 in the search box to learn more about \"Shoulder Bursitis: Care Instructions. \" Current as of: June 26, 2019 Content Version: 12.2 © 4365-9275 [employer] ([employer]). Care instructions adapted under license by [employer] (which disclaims liability or warranty for this information). If you have questions about a medical condition or this instruction, always ask your healthcare professional. [place] [address] any warranty or liability for your use of this information. Shoulder Bursitis: Exercises Introduction Here are some examples of exercises for you to try. The exercises may be suggested for a condition or for rehabilitation. Start each exercise slowly. Ease off the exercises if you start to have pain. You will be told when to start these exercises and which ones will work best for you. How to do the exercises Posterior stretching exercise 1. Hold the elbow of your injured arm with your other hand. 2. Use your hand to pull your injured arm gently up and across your body. You will feel a gentle stretch across the back of your injured shoulder. 3. Hold for at least 15 to 30 seconds. Then slowly lower your arm. 4. Repeat 2 to 4 times. Up-the-back stretch 1. Put your hand in your back pocket. Let it rest there to stretch your shoulder. 2. With your other hand, hold your injured arm (palm outward) behind your back by the wrist. Pull your arm up gently to stretch your shoulder. 3. Next, put a towel over your other shoulder. Put the hand of your injured arm behind your back. Now hold the back end of the towel. With the other hand, hold the front end of the towel in front of your body. Pull gently on the front end of the towel. This will bring your hand farther up your back to stretch your shoulder. Overhead stretch 1. Standing about an arm's length away, grasp onto a solid surface.  You could use a countertop, a doorknob, or the back of a sturdy chair. 2. With your knees slightly bent, bend forward with your arms straight. Lower your upper body, and let your shoulders stretch. 3. As your shoulders are able to stretch farther, you may need to take a step or two backward. 4. Hold for at least 15 to 30 seconds. Then stand up and relax. If you had stepped back during your stretch, step forward so you can keep your hands on the solid surface. 5. Repeat 2 to 4 times. Shoulder flexion (lying down) 1. Lie on your back, holding a wand with both hands. Your palms should face down as you hold the wand. 2. Keeping your elbows straight, slowly raise your arms over your head. Raise them until you feel a stretch in your shoulders, upper back, and chest. 
3. Hold for 15 to 30 seconds. 4. Repeat 2 to 4 times. Shoulder rotation (lying down) 1. Lie on your back. Hold a wand with both hands with your elbows bent and palms up. 2. Keep your elbows close to your body, and move the wand across your body toward the sore arm. 3. Hold for 8 to 12 seconds. 4. Repeat 2 to 4 times. Shoulder blade squeeze 1. Stand with your arms at your sides, and squeeze your shoulder blades together. Do not raise your shoulders up as you squeeze. 2. Hold 6 seconds. 3. Repeat 8 to 12 times. Shoulder flexor and extensor exercise 1. Push forward (flex): Stand facing a wall or doorjamb, about 6 inches or less back. Hold your injured arm against your body. Make a closed fist with your thumb on top. Then gently push your hand forward into the wall with about 25% to 50% of your strength. Don't let your body move backward as you push. Hold for about 6 seconds. Relax for a few seconds. Repeat 8 to 12 times. 2. Push backward (extend): Stand with your back flat against a wall.  Your upper arm should be against the wall, with your elbow bent 90 degrees (your hand straight ahead). Push your elbow gently back against the wall with about 25% to 50% of your strength. Don't let your body move forward as you push. Hold for about 6 seconds. Relax for a few seconds. Repeat 8 to 12 times. Scapular exercise: Wall push-ups 1. Stand facing a wall, about 12 inches to 18 inches away. 2. Place your hands on the wall at shoulder height. 3. Slowly bend your elbows and bring your face to the wall. Keep your back and hips straight. 4. Push back to where you started. 5. Repeat 8 to 12 times. 6. When you can do this exercise against a wall comfortably, you can try it against a counter. You can then slowly progress to the end of a couch, then to a sturdy chair, and finally to the floor. Scapular exercise: Retraction 1. Put the band around a solid object at about waist level. (A bedpost will work well.) Each hand should hold an end of the band. 2. With your elbows at your sides and bent to 90 degrees, pull the band back. Your shoulder blades should move toward each other. Then move your arms back where you started. 3. Repeat 8 to 12 times. 4. If you have good range of motion in your shoulders, try this exercise with your arms lifted out to the sides. Keep your elbows at a 90-degree angle. Raise the elastic band up to about shoulder level. Pull the band back to move your shoulder blades toward each other. Then move your arms back where you started. Internal rotator strengthening exercise 1. Start by tying a piece of elastic exercise material to a doorknob. You can use surgical tubing or Thera-Band. 2. Stand or sit with your shoulder relaxed and your elbow bent 90 degrees. Your upper arm should rest comfortably against your side. Squeeze a rolled towel between your elbow and your body for comfort. This will help keep your arm at your side. 3. Hold one end of the elastic band in the hand of the painful arm. 4. Slowly rotate your forearm toward your body until it touches your belly. Slowly move it back to where you started. 5. Keep your elbow and upper arm firmly tucked against the towel roll or at your side. 6. Repeat 8 to 12 times. External rotator strengthening exercise 1. Start by tying a piece of elastic exercise material to a doorknob. You can use surgical tubing or Thera-Band. (You may also hold one end of the band in each hand.) 2. Stand or sit with your shoulder relaxed and your elbow bent 90 degrees. Your upper arm should rest comfortably against your side. Squeeze a rolled towel between your elbow and your body for comfort. This will help keep your arm at your side. 3. Hold one end of the elastic band with the hand of the painful arm. 4. Start with your forearm across your belly. Slowly rotate the forearm out away from your body. Keep your elbow and upper arm tucked against the towel roll or the side of your body until you begin to feel tightness in your shoulder. Slowly move your arm back to where you started. 5. Repeat 8 to 12 times. Follow-up care is a key part of your treatment and safety. Be sure to make and go to all appointments, and call your doctor if you are having problems. It's also a good idea to know your test results and keep a list of the medicines you take. Where can you learn more? Go to http://john paul-cirilo.info/. Enter B714 in the search box to learn more about \"Shoulder Bursitis: Exercises. \" Current as of: June 26, 2019 Content Version: 12.2 © 6211-0765 [employer]. Care instructions adapted under license by [employer] (which disclaims liability or warranty for this information). If you have questions about a medical condition or this instruction, always ask your healthcare professional. [address] any warranty or liability for your use of this information.

## 2019-12-16 NOTE — PROGRESS NOTES
Chief Complaint   Patient presents with    Medication Evaluation     Patient presents for follow-up on her blood pressure and cholesterol. She was last seen November 2019. cholsterol  Eating heart healthy, Not eating fast food  Patient is taking any pravastatin. She denies chest pain or shortness of breath. She reports that she is not exercising regularly. Subjective:   Liyah Sotelo is a 68 y.o. female with hypertension. Hypertension ROS: taking medications as instructed, no medication side effects noted, no TIA's, no chest pain on exertion, no dyspnea on exertion, no swelling of ankles. New concerns: none    Vertigo  Patient sees ENT Dr. Jonah Garcia. He initiated verapamil 40 mg twice a day. She is elected to go to daily as she feels her symptoms were not improved on the daily pill. Glucose intolerance  Patient has not being eating a heart healthy diabetic diet. Right shoulder pain  Patient reports that she has been to physical therapy and she notes that she has pain in her right shoulder with radiation to her right upper arm. The pain seems to come and go and increases with activity. She is taking Tylenol and Aleve with good results.         Past Medical History:   Diagnosis Date    Arthritis     IN HIPS - resolved since replacement    Brain tumor ([facility] [address]     Pt being monitored by Dr Faiza Francis, meningioma    Environmental allergies     GERD (gastroesophageal reflux disease)     High cholesterol     Hypertension     Migraine     \"AFFECTS EYES\"; EPISODES START WITH EAR FULLNESS SENSATION    S/P chemotherapy, time since greater than 12 weeks 1996    Uterine cancer ([facility] [address] 1996     was seen by Dr. Tess Ku Vertigo or labyrinthine disorder 10/31/2011    ENT shunt placed on left side behind ear     Past Surgical History:   Procedure Laterality Date    COLONOSCOPY N/A 11/27/2018    COLONOSCOPY performed by Taryn Chaney MD at [address] [place] [address], COLON, DIAGNOSTIC  3/12/2010 ([facility])-f/u 5 yrs. [facility] HX HEENT  2012    shunt placed behind left ear to help with vertigo    HX HIP REPLACEMENT Right 2017    HX HYSTERECTOMY  1996    WITH BSO    HX ORTHOPAEDIC      right CHEPE     Social History     Socioeconomic History    Marital status:      Spouse name: Not on file    Number of children: Not on file    Years of education: Not on file    Highest education level: Not on file   Tobacco Use    Smoking status: Former Smoker     Packs/day: 0.50     Years: 20.00     Pack years: 10.00     Types: Cigarettes     Last attempt to quit: 1994     Years since quittin.9    Smokeless tobacco: Never Used    Tobacco comment: not anymore   Substance and Sexual Activity    Alcohol use: No     Alcohol/week: 0.0 standard drinks    Drug use: No    Sexual activity: Not Currently     Comment: no   Social History Narrative    Retired at 69 yo but had to stop working due to vertigo    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        Grandson, 25, great nephew,21,  temporarily living with her        2 daughters both healthy     Family History   Problem Relation Age of Onset    Cancer Mother         colon    Stroke Mother     Heart Disease Mother     Diabetes Father     Cancer Father         kidney    Diabetes Sister     Elevated Lipids Sister     Heart Disease Sister     Hypertension Sister     Breast Cancer Sister 61    Heart Disease Brother     Breast Cancer Other     Breast Cancer Other     Anesth Problems Neg Hx      Current Outpatient Medications   Medication Sig Dispense Refill    verapamil (CALAN) 40 mg tablet Take 1 Tab by mouth two (2) times a day. Indications: FOR VERTIGO 60 Tab 0    triamterene-hydroCHLOROthiazide (MAXZIDE) 37.5-25 mg per tablet Take 1 Tab by mouth daily. 90 Tab 0    naproxen sodium (ALEVE) 220 mg cap Take  by mouth.  folic acid/multivit-min/lutein (CENTRUM SILVER PO) Take  by mouth.       pantoprazole (PROTONIX) 40 mg tablet Take 1 Tab by mouth daily. 90 Tab 0    potassium 99 mg tablet Take 99 mg by mouth daily. No Known Allergies    Review of Systems - General ROS: negative for - chills, fatigue, fever, hot flashes, malaise or night sweats  Cardiovascular ROS: no chest pain or dyspnea on exertion  Respiratory ROS: no cough, shortness of breath, or wheezing    Visit Vitals  /84 (BP 1 Location: Left arm, BP Patient Position: Sitting)   Pulse 79   Temp 97.5 °F (36.4 °C) (Oral)   Resp 12   Ht 5' 4\" (1.626 m)   Wt 175 lb 3.2 oz (79.5 kg)   SpO2 96%   BMI 30.07 kg/m²     General Appearance:  Well developed, well nourished,alert and oriented x 3, and individual in no acute distress. Ears/Nose/Mouth/Throat:   Hearing grossly normal.enlarged right nasal turbinate         Neck: Supple, no lad, no bruits   Chest:   Lungs clear to auscultation bilaterally. Cardiovascular:  Regular rate and rhythm, S1, S2 normal, no murmur. Abdomen:   Soft, epigastric pain on palpation, bowel sounds are active. Extremities: No edema bilaterally. Pain on palpation of IT band and posterior knee and hamstring area, neg straight leg raise   Skin: Warm and dry, no suspicious lesions                 Diagnoses and all orders for this visit:    1. Vertigo  Continue verapamil  Discussed with patient that she needs to follow-up with ENT to let him know that she has decreased from twice a day to daily. -     verapamil (CALAN) 40 mg tablet; Take 1 Tab by mouth daily. rx'd by dr. Jose E Ramirez  Indications: FOR VERTIGO    2. Mixed hyperlipidemia  Continue pravastatin 10 mg however if her hemoglobin A1c is greater than 6.4 then her LDL goal should be less than 100. This was discussed with her.  -     LIPID PANEL; Future  -     METABOLIC PANEL, COMPREHENSIVE; Future    3. Elevated glucose  We will recheck A1c  -     HEMOGLOBIN A1C WITH EAG; Future    4.  Immunization due  -     pneumococcal 13 aneesh conj dip (PREVNAR-13) 0.5 mL syrg injection; 0.5 mL by IntraMUSCular route once for 1 dose. Right shoulder pain  Not improving  Exercises given for bursitis. She will try. If no improvement with home exercises and Tylenol then I can refer to an Ortho, Dr. Angelica Melo    Hypertension  Stable continue meds    Patient will follow-up in 3 months or earlier if needed. Her hemoglobin A1c is greater than 6.4 we may need to initiate oral hypoglycemic or injectable. Further her LDL will have to be less than 100 as noted above.

## 2020-02-25 DIAGNOSIS — I10 ESSENTIAL HYPERTENSION: ICD-10-CM

## 2020-02-25 RX ORDER — TRIAMTERENE/HYDROCHLOROTHIAZID 37.5-25 MG
1 TABLET ORAL DAILY
Qty: 90 TAB | Refills: 0 | Status: SHIPPED | OUTPATIENT
Start: 2020-02-25 | End: 2020-04-06 | Stop reason: SDUPTHER

## 2020-02-25 NOTE — TELEPHONE ENCOUNTER
----- Message from Matt Cuevas sent at 2/25/2020  9:11 AM EST -----  Regarding: Dr. Johanna Hunter  Contact: [phone]  [address] [place] [address] (if not patient):n/a   Relationship of caller (if not patient): n/a  Best contact number(s): [phone]   Name of medication and dosage if known: Triamterene 90 quantity   Is patient out of this medication (yes/no):no   Pharmacy name: [id number] [facility] [facility] [address] Pharmacy listed in chart? (yes/no): yes   Pharmacy phone number: n/a   Date of last visit: Monday, December 16, 2019 08:00 AM   Details to clarify the request: Pt has 3 pills left, will be out of medication by Friday.

## 2020-03-02 NOTE — TELEPHONE ENCOUNTER
----- Message from Caitlin Pollock sent at 8/25/2017  7:46 AM EDT -----  Regarding: Dr. Ronan Camarena  Pt is requesting a referral to a Gynecologist. Pt best contact [phone].
Spoke to pt, per PCP, recommended [facility], gave patient phone number.
yes

## 2020-04-02 DIAGNOSIS — R42 VERTIGO: ICD-10-CM

## 2020-04-02 RX ORDER — VERAPAMIL HYDROCHLORIDE 40 MG/1
TABLET ORAL
Qty: 60 TAB | Refills: 0 | Status: SHIPPED | OUTPATIENT
Start: 2020-04-02 | End: 2020-04-06 | Stop reason: SDUPTHER

## 2020-04-02 RX ORDER — VERAPAMIL HYDROCHLORIDE 40 MG/1
TABLET ORAL
Qty: 60 TAB | Refills: 0 | Status: SHIPPED | OUTPATIENT
Start: 2020-04-02 | End: 2020-04-02 | Stop reason: SDUPTHER

## 2020-04-02 NOTE — TELEPHONE ENCOUNTER
Pt requesting antibiotic to be called into [facility] for a sinus infection/head cold. No video access capability to set up VVS/Telemed visit. Thanks.

## 2020-04-02 NOTE — TELEPHONE ENCOUNTER
Attempted to reach pt to discuss symptoms and schedule a phone appt per Dr. Butch Mckeon, no answer on home or cell.  Left voicemail requesting return call

## 2020-04-03 ENCOUNTER — TELEPHONE (OUTPATIENT)
Dept: INTERNAL MEDICINE CLINIC | Age: 70
End: 2020-04-03

## 2020-04-03 NOTE — TELEPHONE ENCOUNTER
----- Message from Vika Gale sent at 4/2/2020  4:42 PM EDT -----  Regarding: DR Barbara Velez / TELEPHONE  Patient return call    In regard to obtaining medication for head cold and scheduling a virtual visit. She reports that she has an android cell phone.        Best contact number(s) :   [phone]              Vika Gale

## 2020-04-06 ENCOUNTER — VIRTUAL VISIT (OUTPATIENT)
Dept: INTERNAL MEDICINE CLINIC | Age: 70
End: 2020-04-06

## 2020-04-06 DIAGNOSIS — J40 BRONCHITIS: ICD-10-CM

## 2020-04-06 DIAGNOSIS — I10 ESSENTIAL HYPERTENSION: ICD-10-CM

## 2020-04-06 DIAGNOSIS — E11.9 CONTROLLED TYPE 2 DIABETES MELLITUS WITHOUT COMPLICATION, WITHOUT LONG-TERM CURRENT USE OF INSULIN (HCC): Primary | ICD-10-CM

## 2020-04-06 DIAGNOSIS — R42 VERTIGO: ICD-10-CM

## 2020-04-06 DIAGNOSIS — E78.2 MIXED HYPERLIPIDEMIA: ICD-10-CM

## 2020-04-06 RX ORDER — VERAPAMIL HYDROCHLORIDE 40 MG/1
TABLET ORAL
Qty: 60 TAB | Refills: 0 | Status: SHIPPED | OUTPATIENT
Start: 2020-04-06 | End: 2021-06-02

## 2020-04-06 RX ORDER — MINERAL OIL
180 ENEMA (ML) RECTAL
Qty: 60 TAB | Refills: 0 | Status: SHIPPED | OUTPATIENT
Start: 2020-04-06 | End: 2020-08-20 | Stop reason: ALTCHOICE

## 2020-04-06 RX ORDER — TRIAMTERENE/HYDROCHLOROTHIAZID 37.5-25 MG
1 TABLET ORAL DAILY
Qty: 90 TAB | Refills: 0 | Status: SHIPPED | OUTPATIENT
Start: 2020-04-06 | End: 2020-05-26 | Stop reason: SDUPTHER

## 2020-04-06 RX ORDER — PRAVASTATIN SODIUM 20 MG/1
20 TABLET ORAL
Qty: 90 TAB | Refills: 0 | Status: SHIPPED | OUTPATIENT
Start: 2020-04-06 | End: 2020-08-06

## 2020-04-06 RX ORDER — AZITHROMYCIN 250 MG/1
250 TABLET, FILM COATED ORAL SEE ADMIN INSTRUCTIONS
Qty: 6 TAB | Refills: 0 | Status: SHIPPED | OUTPATIENT
Start: 2020-04-06 | End: 2020-04-11

## 2020-04-06 NOTE — PROGRESS NOTES
Consent: Charmel Boast, who was seen by synchronous (real-time) audio-video technology, and/or her healthcare decision maker, is aware that this patient-initiated, Telehealth encounter on 4/6/2020 is a billable service, with coverage as determined by her insurance carrier. She is aware that she may receive a bill and has provided verbal consent to proceed: YES      712  Assessment & Plan:   Diagnoses and all orders for this visit:    1. Controlled type 2 diabetes mellitus without complication, without long-term current use of insulin ([facility] [address]  I discussed medications versus DASH diet. Given the covid issues will hold off on meds. She will need to follow-up with me in June and we will check her hemoglobin A1c    2. Vertigo  Stable continue verapamil    3. Essential hypertension  Requested patient to check her blood pressure and let me know if her blood pressure is running higher than 140/80 consistently  -     triamterene-hydroCHLOROthiazide (MAXZIDE) 37.5-25 mg per tablet; Take 1 Tab by mouth daily. -     verapamiL (CALAN) 40 mg tablet; TAKE 1 TABLET BY MOUTH ONCE DAILY    4. Mixed hyperlipidemia  Patient's cholesterol is not controlled but she is tolerating a higher dose of pravastatin  -     pravastatin (PRAVACHOL) 20 mg tablet; Take 1 Tab by mouth nightly. URI  We will try allergy medication first but if symptoms progress and develops green sputum will send in a Z-Terell  She denies fever  -     fexofenadine (ALLEGRA) 180 mg tablet; Take 1 Tab by mouth nightly. We will send in Z-Terell if symptoms progress. Subjective:   Charmel Boast is a 68 y.o. female who was seen for Cold Symptoms; Nasal Discharge; and Cough (productive)      cholsterol  Labs reviewed with patient from December. Her LDL cholesterol was elevated at 153. She also developed diabetes. She has been compliant and been taking pravastatin 20 mg daily.     Subjective:   Charmel Boast is a 68 y.o. female with hypertension. Hypertension ROS: taking medications as instructed, no medication side effects noted, no TIA's, no chest pain on exertion, no dyspnea on exertion, no swelling of ankles. New concerns: Patient has not been checking her blood pressure at home. She denies chest pain or shortness of breath    Vertigo  Patient sees ENT Dr. Angelo Greene. He initiated verapamil 40 mg twice a day. She reports her vertigo has improved    Diabetes  Labs reviewed with patient from December. She is trying to eat a DASH diet but she is not compliant every day. She notes she eats that occasionally. Discussed with her her lab results findings and she reports that she will be more compliant      URI  Patient reports that she has been having a scratchy throat with increased congestion. She notes that when she coughs she has seen a little bit of blood. She does not have any sinus pain or pressure. She is not coughing up any green nasal sputum. She is not taking any allergy medicine. She denies fever    Current Outpatient Medications   Medication Sig    triamterene-hydroCHLOROthiazide (MAXZIDE) 37.5-25 mg per tablet Take 1 Tab by mouth daily.  pravastatin (PRAVACHOL) 20 mg tablet Take 1 Tab by mouth nightly.  verapamiL (CALAN) 40 mg tablet TAKE 1 TABLET BY MOUTH ONCE DAILY    fexofenadine (ALLEGRA) 180 mg tablet Take 1 Tab by mouth nightly.  naproxen sodium (ALEVE) 220 mg cap Take  by mouth.  folic acid/multivit-min/lutein (CENTRUM SILVER PO) Take  by mouth.  pantoprazole (PROTONIX) 40 mg tablet Take 1 Tab by mouth daily.  potassium 99 mg tablet Take 99 mg by mouth daily. No current facility-administered medications for this visit.         No Known Allergies  Subjective    Past Medical History:   Diagnosis Date    Arthritis     IN HIPS - resolved since replacement    Brain tumor ([facility] [address]     Pt being monitored by Dr Braeden Haley, meningioma    Environmental allergies     GERD (gastroesophageal reflux disease)     High cholesterol     Hypertension     Migraine     \"AFFECTS EYES\"; EPISODES START WITH EAR FULLNESS SENSATION    S/P chemotherapy, time since greater than 12 weeks 1996    Uterine cancer ([facility] [address] 1996     was seen by Dr. Valerie Reilly Vertigo or labyrinthine disorder 10/31/2011    ENT shunt placed on left side behind ear       ROS  All other systems reviewed and negative, unless mentioned in HPI    Objective:   Vital Signs: (As obtained by patient/caregiver at home)  There were no vitals taken for this visit. This is a telephone visit. She has a raspy voice when talking to her on the phone. We discussed the expected course, resolution and complications of the diagnosis(es) in detail. Medication risks, benefits, costs, interactions, and alternatives were discussed as indicated. I advised her to contact the office if her condition worsens, changes or fails to improve as anticipated. She expressed understanding with the diagnosis(es) and plan. Claudette Stern is a 68 y.o. female being evaluated by a video visit encounter for concerns as above. A caregiver was present when appropriate. Due to this being a TeleHealth encounter (During [facility]- public [facility] emergency), evaluation of the following organ systems was limited: Vitals/Constitutional/EENT/Resp/CV/GI//MS/Neuro/Skin/Heme-Lymph-Imm. Pursuant to the emergency declaration under the [facility], [address]5 waiver authority and the [employer] General Act, this Virtual  Visit was conducted, with patient's (and/or legal guardian's) consent, to reduce the patient's risk of exposure to COVID-19 and provide necessary medical care. Services were provided through a video synchronous discussion virtually to substitute for in-person clinic visit. Patient and provider were located at their individual homes.     Truman Robin MD      This is a telephone visit.   I spent 15 min with pt on the phone and >50% of the time was spent on managemetn and counseling re: her Diabetes new dx and cholesterol

## 2020-05-26 DIAGNOSIS — I10 ESSENTIAL HYPERTENSION: ICD-10-CM

## 2020-05-26 RX ORDER — TRIAMTERENE/HYDROCHLOROTHIAZID 37.5-25 MG
1 TABLET ORAL DAILY
Qty: 90 TAB | Refills: 0 | Status: SHIPPED | OUTPATIENT
Start: 2020-05-26 | End: 2020-07-30 | Stop reason: ALTCHOICE

## 2020-05-26 NOTE — TELEPHONE ENCOUNTER
----- Message from Rock Martinez sent at 5/26/2020  9:02 AM EDT -----  Regarding: Maria R Vargas MD/ refill  Medication Refill    Caller (if not patient):Hawa LAMBERT       Relationship of caller (if not patient):      Best contact number(s):[phone]      Name of medication and dosage if known: triamterene       Is patient out of this medication (yes/no): 2 left       [facility]: [facility]     Pharmacy listed in chart? (yes/no): yes  Pharmacy phone number:[phone]      Details to clarify the request:      Rock Martinez

## 2020-07-30 ENCOUNTER — HOSPITAL ENCOUNTER (OUTPATIENT)
Dept: LAB | Age: 70
Discharge: HOME | End: 2020-07-30

## 2020-07-30 ENCOUNTER — OFFICE VISIT (OUTPATIENT)
Dept: INTERNAL MEDICINE CLINIC | Age: 70
End: 2020-07-30

## 2020-07-30 VITALS
OXYGEN SATURATION: 97 % | DIASTOLIC BLOOD PRESSURE: 91 MMHG | WEIGHT: 176.38 LBS | SYSTOLIC BLOOD PRESSURE: 160 MMHG | HEART RATE: 69 BPM | HEIGHT: 64 IN | TEMPERATURE: 98 F | BODY MASS INDEX: 30.11 KG/M2 | RESPIRATION RATE: 18 BRPM

## 2020-07-30 DIAGNOSIS — I10 ESSENTIAL HYPERTENSION: ICD-10-CM

## 2020-07-30 DIAGNOSIS — D32.0 BENIGN NEOPLASM OF CEREBRAL MENINGES (HCC): ICD-10-CM

## 2020-07-30 DIAGNOSIS — G43.809 VESTIBULAR MIGRAINE: ICD-10-CM

## 2020-07-30 DIAGNOSIS — I10 ESSENTIAL HYPERTENSION: Primary | ICD-10-CM

## 2020-07-30 DIAGNOSIS — F43.9 SITUATIONAL STRESS: ICD-10-CM

## 2020-07-30 LAB
ANION GAP SERPL CALC-SCNC: 7 MMOL/L (ref 5–15)
BUN SERPL-MCNC: 20 MG/DL (ref 6–20)
BUN/CREAT SERPL: 22 (ref 12–20)
CALCIUM SERPL-MCNC: 9.2 MG/DL (ref 8.5–10.1)
CHLORIDE SERPL-SCNC: 109 MMOL/L (ref 97–108)
CO2 SERPL-SCNC: 25 MMOL/L (ref 21–32)
CREAT SERPL-MCNC: 0.91 MG/DL (ref 0.55–1.02)
GLUCOSE SERPL-MCNC: 84 MG/DL (ref 65–100)
MAGNESIUM SERPL-MCNC: 2.2 MG/DL (ref 1.6–2.4)
POTASSIUM SERPL-SCNC: 3.6 MMOL/L (ref 3.5–5.1)
SODIUM SERPL-SCNC: 141 MMOL/L (ref 136–145)
TSH SERPL DL<=0.05 MIU/L-ACNC: 2.81 UIU/ML (ref 0.36–3.74)

## 2020-07-30 RX ORDER — BUSPIRONE HYDROCHLORIDE 5 MG/1
5 TABLET ORAL
Qty: 30 TAB | Refills: 0 | Status: SHIPPED | OUTPATIENT
Start: 2020-07-30 | End: 2020-08-20

## 2020-07-30 RX ORDER — TRIAMTERENE/HYDROCHLOROTHIAZID 37.5-25 MG
1 TABLET ORAL DAILY
Qty: 90 TAB | Refills: 0 | Status: SHIPPED | OUTPATIENT
Start: 2020-07-30 | End: 2020-08-05 | Stop reason: SDUPTHER

## 2020-07-30 RX ORDER — PREDNISONE 10 MG/1
TABLET ORAL
COMMUNITY
Start: 2020-07-27 | End: 2020-08-20 | Stop reason: ALTCHOICE

## 2020-07-30 RX ORDER — BACLOFEN 10 MG/1
TABLET ORAL AS NEEDED
COMMUNITY
Start: 2020-07-27 | End: 2020-09-14

## 2020-07-30 NOTE — PROGRESS NOTES
Transitional Care Management Progress Note    Patient: Shaniqua Zimmerman  : 1950  PCP: Dipesh Chávez MD    Date of admission:   Date of discharge:     Patient was contacted by Transitional Care Management services within two days after her discharge: Yes. This encounter and supporting documentation was reviewed if available. Medication reconciliation was performed today (2020). Assessment/Plan:   Diagnoses and all orders for this visit:    Discussion of events leading up to patient's admission to [facility] was reviewed with patient. Per history patient has an extraordinary amount of stress with regards to her family. Her grandson is in legal trouble and was asked by daughter to have him in her house on bond. Patient reports an overwhelming amount of stress. Patient took grandson and in the past and trouble occurred and patient was actually taken out of her own house so her son could stay. Patient was tearful on exam.  I think with patient's level of stress it may have helped to trigger her migraine/vestibular migraine which caused her to have nausea vomiting dehydration and hypokalemia. Patient agrees that her blood pressure and electrolytes were likely off due to her prior stressful events which likely triggered. 1. Essential hypertension  Not controlled today  Patient tearful on exam  We will check labs and restart her Dyazide hydrochlorothiazide. Discussed with patient that stress likely aggravating her blood pressure as well. She will check her blood pressure at home and can decrease to half tablet if needed. She will have close follow-up in 1 week to recheck her blood pressure and we will also recheck her labs if she is on the diuretic. -     MAGNESIUM; Future  -     METABOLIC PANEL, BASIC; Future  -     MAGNESIUM; Future  -     METABOLIC PANEL, BASIC; Future  -     TSH 3RD GENERATION; Future  -     [place] DISCHARGE MEDS RECONCILED W/ CURRENT OUTPATIENT MED LIST    2. Situational stress  Aggravating her blood pressure as well as may be triggering her vestibular migraine and vertigo symptoms  Patient prefers not to be on daily SSRI or long-acting medication.  -     busPIRone (BUSPAR) 5 mg tablet; Take 1 Tab by mouth daily as needed (stress). -     [place] DISCHARGE MEDS RECONCILED W/ CURRENT OUTPATIENT MED LIST    3. Benign neoplasm of cerebral meninges [facility])  Patient needs to have follow-up with her neurologist as meningioma has increased in size    4. Vestibular migraine  Patient will follow-up with neurology  She will also take her BuSpar to see if this helps    Patient will need close follow-up with regards to her blood pressure and her electrolytes. She will be seen next week. We will also follow-up to see how she is doing on her BuSpar and situational stress. She is a good candidate to see  Rosario Mccauley. Subjective:   Harvinder Avila is a 65 y.o. female presenting today for follow-up after being discharged from [facility].  The discharge summary was reviewed or requested. The main problem requiring admission was vestibular migraine. Complications during admission: Patient had an elevated troponin x1 which resolved. She had a notable increase in her meningioma. Interval history/Current status:   Since being at home patient reports she still has some headache type symptoms. She reports increased stress related to her grandson who is in legal trouble. She reports that she was asked to help to house him while he is on bond. She reports that she was actually asked to leave her own house so her grandson could stay there several years ago. Patient reports the grandson is very disruptive smokes and does not listen to what she says. Patient is conflicted because she would like to help but is overwhelming for her to help grandson out. Subjective:   Harvinder Avila is a 65 y.o. female with hypertension.     Hypertension ROS: taking medications as instructed, no medication side effects noted, no TIA's, no chest pain on exertion, no dyspnea on exertion, no swelling of ankles. New concerns: Not controlled patient is not on triamterene or triamterene/hydrochlorothiazide. She would like to restart her blood pressure medication that she was on in the past..         Admitting symptoms have: improved      Medications marked \"taking\" at this time:  Home Medications    Medication Sig Start Date End Date Taking? Authorizing Provider   baclofen (LIORESAL) 10 mg tablet TK 1 T PO BID 7/27/20  Yes Provider, Historical   predniSONE (DELTASONE) 10 mg tablet  7/27/20  Yes Provider, Historical   busPIRone (BUSPAR) 5 mg tablet Take 1 Tab by mouth daily as needed (stress). 7/30/20  Yes Severiano Searing, MD   pravastatin (PRAVACHOL) 20 mg tablet Take 1 Tab by mouth nightly. 4/6/20  Yes Hope Evangelista MD   fexofenadine (ALLEGRA) 180 mg tablet Take 1 Tab by mouth nightly. 4/6/20  Yes Hope Evangelista MD   naproxen sodium (ALEVE) 220 mg cap Take  by mouth. Yes Provider, Historical   folic acid/multivit-min/lutein (CENTRUM SILVER PO) Take  by mouth. Yes Provider, Historical   pantoprazole (PROTONIX) 40 mg tablet Take 1 Tab by mouth daily. 4/20/16  Yes Hope Evangelista MD   potassium 99 mg tablet Take 99 mg by mouth daily. Yes Provider, Historical   triamterene-hydroCHLOROthiazide (MAXZIDE) 37.5-25 mg per tablet Take 1 Tab by mouth daily.  5/26/20 7/30/20  Severiano Searing, MD   verapamiL (CALAN) 40 mg tablet TAKE 1 TABLET BY MOUTH ONCE DAILY 4/6/20   Severiano Searing, MD        Review of Systems:  General ROS: negative for - chills, fever or hot flashes  Psychological ROS: positive for - anxiety and sleep disturbances  Ophthalmic ROS: negative  ENT ROS: negative for - epistaxis, hearing change, nasal congestion, nasal discharge, nasal polyps or sinus pain  Allergy and Immunology ROS: negative  Respiratory ROS: no cough, shortness of breath, or wheezing  Cardiovascular ROS: no chest pain or dyspnea on exertion  Gastrointestinal ROS: no abdominal pain, change in bowel habits, or black or bloody stools       Current Outpatient Medications   Medication Sig Dispense Refill    baclofen (LIORESAL) 10 mg tablet TK 1 T PO BID      predniSONE (DELTASONE) 10 mg tablet       busPIRone (BUSPAR) 5 mg tablet Take 1 Tab by mouth daily as needed (stress). 30 Tab 0    pravastatin (PRAVACHOL) 20 mg tablet Take 1 Tab by mouth nightly. 90 Tab 0    fexofenadine (ALLEGRA) 180 mg tablet Take 1 Tab by mouth nightly. 60 Tab 0    naproxen sodium (ALEVE) 220 mg cap Take  by mouth.  folic acid/multivit-min/lutein (CENTRUM SILVER PO) Take  by mouth.  pantoprazole (PROTONIX) 40 mg tablet Take 1 Tab by mouth daily. 90 Tab 0    potassium 99 mg tablet Take 99 mg by mouth daily.  verapamiL (CALAN) 40 mg tablet TAKE 1 TABLET BY MOUTH ONCE DAILY 60 Tab 0     Past Medical History:   Diagnosis Date    Arthritis     IN HIPS - resolved since replacement    Brain tumor ([facility]     Pt being monitored by Dr Radha Joy, meningioma    Environmental allergies     GERD (gastroesophageal reflux disease)     High cholesterol     Hypertension     Migraine     \"AFFECTS EYES\"; EPISODES START WITH EAR FULLNESS SENSATION    S/P chemotherapy, time since greater than 12 weeks 1996    Uterine cancer ([facility] [address] 1996     was seen by Dr. Katherine Adam Vertigo or labyrinthine disorder 10/31/2011    ENT shunt placed on left side behind ear     Past Surgical History:   Procedure Laterality Date    COLONOSCOPY N/A 11/27/2018    COLONOSCOPY performed by Vanessa Cuba MD at [address] [place] [address] [place], COLON, DIAGNOSTIC  3/12/2010    ([place])-f/u 5 yrs.    [facility] HX HEENT  2012    shunt placed behind left ear to help with vertigo    HX HIP REPLACEMENT Right 2017    HX HYSTERECTOMY  1996    WITH BSO    HX ORTHOPAEDIC      right CHEPE     Family History Problem Relation Age of Onset    Cancer Mother         colon    Stroke Mother     Heart Disease Mother     Diabetes Father     Cancer Father         kidney    Diabetes Sister     Elevated Lipids Sister     Heart Disease Sister     Hypertension Sister     Breast Cancer Sister 61    Heart Disease Brother     Breast Cancer Other     Breast Cancer Other     Anesth Problems Neg Hx      Social History     Tobacco Use    Smoking status: Former Smoker     Packs/day: 0.50     Years: 20.00     Pack years: 10.00     Types: Cigarettes     Last attempt to quit: 1994     Years since quittin.5    Smokeless tobacco: Never Used    Tobacco comment: not anymore   Substance Use Topics    Alcohol use: No     Alcohol/week: 0.0 standard drinks          Objective:   BP (!) 160/91 (BP 1 Location: Left arm, BP Patient Position: Sitting)   Pulse 69   Temp 98 °F (36.7 °C) (Oral)   Resp 18   Ht 5' 4\" (1.626 m)   Wt 176 lb 6 oz (80 kg)   SpO2 97%   BMI 30.27 kg/m²      Physical Examination: General appearance - alert, well appearing, and in no distress  Mental status - alert, oriented to person, place, and time, Patient is tearful when discussing her home situation with grandson. Eyes - pupils equal and reactive, extraocular eye movements intact  Ears - hearing grossly normal bilaterally  Nose - normal and patent, no erythema, discharge or polyps  Mouth - mucous membranes moist, pharynx normal without lesions  Chest - clear to auscultation, no wheezes, rales or rhonchi, symmetric air entry  Heart - normal rate, regular rhythm, normal S1, S2, no murmurs, rubs, clicks or gallops  Abdomen - soft, nontender, nondistended, no masses or organomegaly  Musculoskeletal - no joint tenderness, deformity or swelling  Skin - normal coloration and turgor, no rashes, no suspicious skin lesions noted      We discussed the expected course, resolution and complications of the diagnosis(es) in detail.   Medication risks, benefits, costs, interactions, and alternatives were discussed as indicated. I advised her to contact the office if her condition worsens, changes or fails to improve as anticipated. She expressed understanding with the diagnosis(es) and plan.      Fernando Starr MD

## 2020-08-05 ENCOUNTER — HOSPITAL ENCOUNTER (OUTPATIENT)
Dept: LAB | Age: 70
Discharge: HOME | End: 2020-08-05

## 2020-08-05 DIAGNOSIS — I10 ESSENTIAL HYPERTENSION: ICD-10-CM

## 2020-08-05 LAB
ANION GAP SERPL CALC-SCNC: 7 MMOL/L (ref 5–15)
BUN SERPL-MCNC: 15 MG/DL (ref 6–20)
BUN/CREAT SERPL: 16 (ref 12–20)
CALCIUM SERPL-MCNC: 9.1 MG/DL (ref 8.5–10.1)
CHLORIDE SERPL-SCNC: 109 MMOL/L (ref 97–108)
CO2 SERPL-SCNC: 23 MMOL/L (ref 21–32)
CREAT SERPL-MCNC: 0.92 MG/DL (ref 0.55–1.02)
GLUCOSE SERPL-MCNC: 135 MG/DL (ref 65–100)
MAGNESIUM SERPL-MCNC: 2.2 MG/DL (ref 1.6–2.4)
POTASSIUM SERPL-SCNC: 3.6 MMOL/L (ref 3.5–5.1)
SODIUM SERPL-SCNC: 139 MMOL/L (ref 136–145)

## 2020-08-05 RX ORDER — TRIAMTERENE/HYDROCHLOROTHIAZID 37.5-25 MG
1 TABLET ORAL DAILY
Qty: 30 TAB | Refills: 0 | Status: SHIPPED | OUTPATIENT
Start: 2020-08-05 | End: 2020-08-06

## 2020-08-06 DIAGNOSIS — E78.2 MIXED HYPERLIPIDEMIA: ICD-10-CM

## 2020-08-06 RX ORDER — PRAVASTATIN SODIUM 20 MG/1
TABLET ORAL
Qty: 90 TAB | Refills: 0 | Status: SHIPPED | OUTPATIENT
Start: 2020-08-06 | End: 2021-05-03

## 2020-08-20 ENCOUNTER — OFFICE VISIT (OUTPATIENT)
Dept: INTERNAL MEDICINE CLINIC | Age: 70
End: 2020-08-20
Payer: MEDICARE

## 2020-08-20 VITALS
RESPIRATION RATE: 12 BRPM | OXYGEN SATURATION: 98 % | DIASTOLIC BLOOD PRESSURE: 75 MMHG | SYSTOLIC BLOOD PRESSURE: 122 MMHG | HEART RATE: 85 BPM | WEIGHT: 174.8 LBS | TEMPERATURE: 98.7 F | BODY MASS INDEX: 29.84 KG/M2 | HEIGHT: 64 IN

## 2020-08-20 DIAGNOSIS — I10 ESSENTIAL HYPERTENSION: ICD-10-CM

## 2020-08-20 DIAGNOSIS — R42 VERTIGO: Primary | ICD-10-CM

## 2020-08-20 DIAGNOSIS — F43.9 SITUATIONAL STRESS: ICD-10-CM

## 2020-08-20 PROCEDURE — 1090F PRES/ABSN URINE INCON ASSESS: CPT | Performed by: INTERNAL MEDICINE

## 2020-08-20 PROCEDURE — 99214 OFFICE O/P EST MOD 30 MIN: CPT | Performed by: INTERNAL MEDICINE

## 2020-08-20 PROCEDURE — G8427 DOCREV CUR MEDS BY ELIG CLIN: HCPCS | Performed by: INTERNAL MEDICINE

## 2020-08-20 PROCEDURE — 1101F PT FALLS ASSESS-DOCD LE1/YR: CPT | Performed by: INTERNAL MEDICINE

## 2020-08-20 PROCEDURE — G8536 NO DOC ELDER MAL SCRN: HCPCS | Performed by: INTERNAL MEDICINE

## 2020-08-20 PROCEDURE — G9899 SCRN MAM PERF RSLTS DOC: HCPCS | Performed by: INTERNAL MEDICINE

## 2020-08-20 PROCEDURE — G8754 DIAS BP LESS 90: HCPCS | Performed by: INTERNAL MEDICINE

## 2020-08-20 PROCEDURE — G8399 PT W/DXA RESULTS DOCUMENT: HCPCS | Performed by: INTERNAL MEDICINE

## 2020-08-20 PROCEDURE — 3017F COLORECTAL CA SCREEN DOC REV: CPT | Performed by: INTERNAL MEDICINE

## 2020-08-20 PROCEDURE — G8752 SYS BP LESS 140: HCPCS | Performed by: INTERNAL MEDICINE

## 2020-08-20 PROCEDURE — G8417 CALC BMI ABV UP PARAM F/U: HCPCS | Performed by: INTERNAL MEDICINE

## 2020-08-20 PROCEDURE — G8432 DEP SCR NOT DOC, RNG: HCPCS | Performed by: INTERNAL MEDICINE

## 2020-08-20 RX ORDER — TRIAMTERENE/HYDROCHLOROTHIAZID 37.5-25 MG
TABLET ORAL
Qty: 45 TAB | Refills: 0
Start: 2020-08-20 | End: 2020-09-14 | Stop reason: SDUPTHER

## 2020-08-20 NOTE — PROGRESS NOTES
Chief Complaint   Patient presents with    Hypertension       Subjective:   Kiley Canales is a 65 y.o. female with hypertension. Hypertension ROS: taking medications as instructed, no medication side effects noted, no TIA's, no chest pain on exertion, no dyspnea on exertion, no swelling of ankles. New concerns: Patient reports that she was not taking the triamterene hydrochlorothiazide along with her 40 mg of verapamil twice a day. She reports that she did not know to take the triamterene hydrochlorothiazide but has been monitoring her blood pressure. She notes that her blood pressure tends to spike in the 180/110 range occasionally. After further discussion it appears that after stressors her pressure will increase. She reports readings of 122/80 when not on the hydrochlorothiazide triamterene but just on the verapamil  Of note her ENT told her to take the verapamil 3 times a day but patient defers and took it twice a day    Vertigo  Patient report having testing yesterday that made her blood pressure spike. She reports her vertigo was improved. Situational stress  Patient reports the BuSpar made her feel very out of it and sedated. She has learned to say no to her family members and want to let go of the stress they are putting on her.         Past Medical History:   Diagnosis Date    Arthritis     IN HIPS - resolved since replacement    Brain tumor ([facility] [address]     Pt being monitored by Dr Maritza Link, meningioma    Environmental allergies     GERD (gastroesophageal reflux disease)     High cholesterol     Hypertension     Migraine     \"AFFECTS EYES\"; EPISODES START WITH EAR FULLNESS SENSATION    S/P chemotherapy, time since greater than 12 weeks 1996    Uterine cancer ([address] 1996     was seen by Dr. Aidee Brown Vertigo or labyrinthine disorder 10/31/2011    ENT shunt placed on left side behind ear     Past Surgical History:   Procedure Laterality Date    COLONOSCOPY N/A 11/27/2018 COLONOSCOPY performed by Jose Lagos MD at [address] [place] [address] [place], COLON, DIAGNOSTIC  3/12/2010    ([place])-f/u 5 yrs.    [facility]    shunt placed behind left ear to help with vertigo    HX HIP REPLACEMENT Right 2017    HX HYSTERECTOMY  1996    WITH BSO    HX ORTHOPAEDIC      right CHEPE     Social History     Socioeconomic History    Marital status:      Spouse name: Not on file    Number of children: Not on file    Years of education: Not on file    Highest education level: Not on file   Tobacco Use    Smoking status: Former Smoker     Packs/day: 0.50     Years: 20.00     Pack years: 10.00     Types: Cigarettes     Last attempt to quit: 1994     Years since quittin.6    Smokeless tobacco: Never Used    Tobacco comment: not anymore   Substance and Sexual Activity    Alcohol use: No     Alcohol/week: 0.0 standard drinks    Drug use: No    Sexual activity: Not Currently     Comment: no   Social History Narrative    Retired at 69 yo but had to stop working due to vertigo    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        Grandson, 25, great nephew,21,  temporarily living with her        2 daughters both healthy     Family History   Problem Relation Age of Onset    Cancer Mother         colon    Stroke Mother     Heart Disease Mother     Diabetes Father     Cancer Father         kidney    Diabetes Sister     Elevated Lipids Sister     Heart Disease Sister     Hypertension Sister     Breast Cancer Sister 61    Heart Disease Brother     Breast Cancer Other     Breast Cancer Other     Anesth Problems Neg Hx      Current Outpatient Medications   Medication Sig Dispense Refill    triamterene-hydroCHLOROthiazide (MAXZIDE) 37.5-25 mg per tablet TAKE 1/2  TABLET BY MOUTH DAILY  Indications: high blood pressure 45 Tab 0    pravastatin (PRAVACHOL) 20 mg tablet TAKE 1 TABLET BY MOUTH EVERY NIGHT 90 Tab 0    baclofen (LIORESAL) 10 mg tablet as needed.  verapamiL (CALAN) 40 mg tablet TAKE 1 TABLET BY MOUTH ONCE DAILY 60 Tab 0    naproxen sodium (ALEVE) 220 mg cap Take  by mouth.  folic acid/multivit-min/lutein (CENTRUM SILVER PO) Take  by mouth.  pantoprazole (PROTONIX) 40 mg tablet Take 1 Tab by mouth daily. 90 Tab 0    potassium 99 mg tablet Take 99 mg by mouth daily. Allergies   Allergen Reactions    Buspar [Buspirone] Other (comments)     Altered mental status       Review of Systems - General ROS: negative for - chills or fever  Cardiovascular ROS: no chest pain or dyspnea on exertion  Respiratory ROS: no cough, shortness of breath, or wheezing    Visit Vitals  /75 (BP 1 Location: Left arm, BP Patient Position: Sitting)   Pulse 85   Temp 98.7 °F (37.1 °C) (Oral)   Resp 12   Ht 5' 4\" (1.626 m)   Wt 174 lb 12.8 oz (79.3 kg)   SpO2 98%   BMI 30.00 kg/m²     General Appearance:  Well developed, well nourished,alert and oriented x 3, and individual in no acute distress. Ears/Nose/Mouth/Throat:   Hearing grossly normal.         Neck: Supple, no lad, no bruits   Chest:   Lungs clear to auscultation bilaterally. Cardiovascular:  Regular rate and rhythm, S1, S2 normal, no murmur. Abdomen:   Soft, non-tender, bowel sounds are active. Extremities: No edema bilaterally. Skin: Warm and dry, no suspicious lesions                 Diagnoses and all orders for this visit:    1. Vertigo  Patient will have a follow-up appointment with ENT next week. 2. Essential hypertension  Well-controlled and patient is not taking triamterene hydrochlorothiazide. Discussed with patient and she will check her blood pressure and if consistently higher than 140/80 she will initiate 1/2 tablet of triamterene hydrochlorothiazide along with her verapamil 40 mg twice daily.   I instructed her also to MyChart me with regards to her blood pressure readings  I think some of her blood pressure readings elevations may be due to stress    -     triamterene-hydroCHLOROthiazide (MAXZIDE) 37.5-25 mg per tablet; TAKE 1/2  TABLET BY MOUTH DAILY  Indications: high blood pressure    She will follow-up with me in the office in 1 month but will be monitoring me if she has any blood pressure issues before then. 3. Situational stress  Empathetic listening provided  Side effects on BuSpar so this was discontinued. She defers counseling and would rather  with Therese Ulloa. Solutions discussed such as wanting to let go and see note to family members when they are imposing on her. She reports she is working on this.     I spent 25 minutes with this patient greater than 50% of time spent in management and counseling with regards to her blood pressure and compliance with regards to medication recommendations by providers

## 2020-09-14 ENCOUNTER — OFFICE VISIT (OUTPATIENT)
Dept: INTERNAL MEDICINE CLINIC | Age: 70
End: 2020-09-14
Payer: MEDICARE

## 2020-09-14 VITALS
RESPIRATION RATE: 13 BRPM | WEIGHT: 173.8 LBS | DIASTOLIC BLOOD PRESSURE: 86 MMHG | OXYGEN SATURATION: 96 % | BODY MASS INDEX: 29.67 KG/M2 | HEART RATE: 85 BPM | SYSTOLIC BLOOD PRESSURE: 130 MMHG | TEMPERATURE: 97.9 F | HEIGHT: 64 IN

## 2020-09-14 DIAGNOSIS — E11.9 TYPE 2 DIABETES MELLITUS WITHOUT COMPLICATION, WITHOUT LONG-TERM CURRENT USE OF INSULIN (HCC): ICD-10-CM

## 2020-09-14 DIAGNOSIS — Z00.00 MEDICARE ANNUAL WELLNESS VISIT, SUBSEQUENT: Primary | ICD-10-CM

## 2020-09-14 DIAGNOSIS — Z23 NEEDS FLU SHOT: ICD-10-CM

## 2020-09-14 DIAGNOSIS — Z12.31 ENCOUNTER FOR SCREENING MAMMOGRAM FOR MALIGNANT NEOPLASM OF BREAST: ICD-10-CM

## 2020-09-14 DIAGNOSIS — E78.2 MIXED HYPERLIPIDEMIA: ICD-10-CM

## 2020-09-14 DIAGNOSIS — E11.9 COMPREHENSIVE DIABETIC FOOT EXAMINATION, TYPE 2 DM, ENCOUNTER FOR (HCC): ICD-10-CM

## 2020-09-14 DIAGNOSIS — Z78.0 POSTMENOPAUSAL STATE: ICD-10-CM

## 2020-09-14 DIAGNOSIS — I10 ESSENTIAL HYPERTENSION: ICD-10-CM

## 2020-09-14 DIAGNOSIS — E11.9 CONTROLLED TYPE 2 DIABETES MELLITUS WITHOUT COMPLICATION, WITHOUT LONG-TERM CURRENT USE OF INSULIN (HCC): ICD-10-CM

## 2020-09-14 DIAGNOSIS — Z12.11 SCREEN FOR COLON CANCER: ICD-10-CM

## 2020-09-14 DIAGNOSIS — Z13.31 SCREENING FOR DEPRESSION: ICD-10-CM

## 2020-09-14 PROCEDURE — 2022F DILAT RTA XM EVC RTNOPTHY: CPT | Performed by: INTERNAL MEDICINE

## 2020-09-14 PROCEDURE — 90000 DEPRESSION SCREEN ANNUAL: CPT | Performed by: INTERNAL MEDICINE

## 2020-09-14 PROCEDURE — G8752 SYS BP LESS 140: HCPCS | Performed by: INTERNAL MEDICINE

## 2020-09-14 PROCEDURE — G8536 NO DOC ELDER MAL SCRN: HCPCS | Performed by: INTERNAL MEDICINE

## 2020-09-14 PROCEDURE — G9899 SCRN MAM PERF RSLTS DOC: HCPCS | Performed by: INTERNAL MEDICINE

## 2020-09-14 PROCEDURE — G0008 ADMIN INFLUENZA VIRUS VAC: HCPCS | Performed by: INTERNAL MEDICINE

## 2020-09-14 PROCEDURE — G0439 PPPS, SUBSEQ VISIT: HCPCS | Performed by: INTERNAL MEDICINE

## 2020-09-14 PROCEDURE — G8417 CALC BMI ABV UP PARAM F/U: HCPCS | Performed by: INTERNAL MEDICINE

## 2020-09-14 PROCEDURE — 1101F PT FALLS ASSESS-DOCD LE1/YR: CPT | Performed by: INTERNAL MEDICINE

## 2020-09-14 PROCEDURE — G8510 SCR DEP NEG, NO PLAN REQD: HCPCS | Performed by: INTERNAL MEDICINE

## 2020-09-14 PROCEDURE — 1090F PRES/ABSN URINE INCON ASSESS: CPT | Performed by: INTERNAL MEDICINE

## 2020-09-14 PROCEDURE — G8399 PT W/DXA RESULTS DOCUMENT: HCPCS | Performed by: INTERNAL MEDICINE

## 2020-09-14 PROCEDURE — 90694 VACC AIIV4 NO PRSRV 0.5ML IM: CPT | Performed by: INTERNAL MEDICINE

## 2020-09-14 PROCEDURE — 99214 OFFICE O/P EST MOD 30 MIN: CPT | Performed by: INTERNAL MEDICINE

## 2020-09-14 PROCEDURE — 3017F COLORECTAL CA SCREEN DOC REV: CPT | Performed by: INTERNAL MEDICINE

## 2020-09-14 PROCEDURE — G8754 DIAS BP LESS 90: HCPCS | Performed by: INTERNAL MEDICINE

## 2020-09-14 PROCEDURE — G8427 DOCREV CUR MEDS BY ELIG CLIN: HCPCS | Performed by: INTERNAL MEDICINE

## 2020-09-14 RX ORDER — ZOSTER VACCINE RECOMBINANT, ADJUVANTED 50 MCG/0.5
KIT INTRAMUSCULAR
Qty: 0.5 ML | Refills: 1 | Status: SHIPPED | OUTPATIENT
Start: 2020-09-14 | End: 2021-06-02

## 2020-09-14 RX ORDER — METFORMIN HYDROCHLORIDE 500 MG/1
500 TABLET ORAL
Qty: 90 TAB | Refills: 0 | Status: SHIPPED | OUTPATIENT
Start: 2020-09-14 | End: 2020-12-21

## 2020-09-14 RX ORDER — TRIAMTERENE/HYDROCHLOROTHIAZID 37.5-25 MG
1 TABLET ORAL DAILY
Qty: 90 TAB | Refills: 1
Start: 2020-09-14 | End: 2021-02-03

## 2020-09-14 RX ORDER — PNEUMOCOCCAL 13-VALENT CONJUGATE VACCINE 2.2; 2.2; 2.2; 2.2; 2.2; 4.4; 2.2; 2.2; 2.2; 2.2; 2.2; 2.2; 2.2 UG/.5ML; UG/.5ML; UG/.5ML; UG/.5ML; UG/.5ML; UG/.5ML; UG/.5ML; UG/.5ML; UG/.5ML; UG/.5ML; UG/.5ML; UG/.5ML; UG/.5ML
0.5 INJECTION, SUSPENSION INTRAMUSCULAR ONCE
Qty: 0.5 ML | Refills: 0 | Status: SHIPPED | OUTPATIENT
Start: 2020-09-14 | End: 2020-09-14

## 2020-09-14 NOTE — PROGRESS NOTES
Chief Complaint   Patient presents with    Follow-up     B/p check. Patient is here for follow-up of her blood pressure but also presents for Medicare annual wellness exam.    Subjective:   Curtis Chang is a 65 y.o. female with hypertension. Hypertension ROS: taking medications as instructed, no medication side effects noted, no TIA's, no chest pain on exertion, no dyspnea on exertion, no swelling of ankles. New concerns: Patient reports that she was taking half of her tablet of triamterene hydrochlorothiazide and her blood pressure readings were still elevated. She increased to 1 tablet and her blood pressure readings are now in the 110-115/70 range. She denies chest pain or shortness of breath. She reports her blood pressure has been better since she has gotten rid of her family problems related to her grandson. Vertigo  Patient report having testing yesterday that made her blood pressure spike. She reports her vertigo was improved. She will have a follow-up with Dr. Emely Mcclure. Situational stress  Patient reports the BuSpar made her feel very out of it and sedated. She has learned to say no to her family members and want to let go of the stress they are putting on her. Diabetes  Patient reports she prefers to stay off of medication. She has not been compliant with a heart healthy diabetic diet. She denies nocturia. She is agreeable to taking metformin if her hemoglobin A1c is greater than 6.4.         Past Medical History:   Diagnosis Date    Arthritis     IN HIPS - resolved since replacement    Brain tumor ([facility]     Pt being monitored by Dr Daysi Carranza, meningioma    Environmental allergies     GERD (gastroesophageal reflux disease)     High cholesterol     Hypertension     Migraine     \"AFFECTS EYES\"; EPISODES START WITH EAR FULLNESS SENSATION    S/P chemotherapy, time since greater than 12 weeks 1996    Uterine cancer ([facility] 1996     was seen by Dr. Sara Bowles Vertigo or labyrinthine disorder 10/31/2011    ENT shunt placed on left side behind ear     Past Surgical History:   Procedure Laterality Date    COLONOSCOPY N/A 2018    COLONOSCOPY performed by Best Cedeño MD at [facility]. [address], COLON, DIAGNOSTIC  3/12/2010    (Joaqu[place])-f/u 5 yrs.    [facility] HX HEENT      shunt placed behind left ear to help with vertigo    HX HIP REPLACEMENT Right 2017    HX HYSTERECTOMY  1996    WITH BSO    HX ORTHOPAEDIC      right CHEPE     Social History     Socioeconomic History    Marital status:      Spouse name: Not on file    Number of children: Not on file    Years of education: Not on file    Highest education level: Not on file   Tobacco Use    Smoking status: Former Smoker     Packs/day: 0.50     Years: 20.00     Pack years: 10.00     Types: Cigarettes     Last attempt to quit: 1994     Years since quittin.7    Smokeless tobacco: Never Used    Tobacco comment: not anymore   Substance and Sexual Activity    Alcohol use: No     Alcohol/week: 0.0 standard drinks    Drug use: No    Sexual activity: Not Currently     Comment: no   Social History Narrative    Retired at 69 yo but had to stop working due to vertigo    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        Grandson, 25, great nephew,21,  temporarily living with her        2 daughters both healthy     Family History   Problem Relation Age of Onset    Cancer Mother         colon    Stroke Mother     Heart Disease Mother     Diabetes Father     Cancer Father         kidney    Diabetes Sister     Elevated Lipids Sister     Heart Disease Sister     Hypertension Sister     Breast Cancer Sister 61    Heart Disease Brother     Breast Cancer Other     Breast Cancer Other     Anesth Problems Neg Hx      Current Outpatient Medications   Medication Sig Dispense Refill    triamterene-hydroCHLOROthiazide (MAXZIDE) 37.5-25 mg per tablet TAKE 1/2 TABLET BY MOUTH DAILY  Indications: high blood pressure 45 Tab 0    pravastatin (PRAVACHOL) 20 mg tablet TAKE 1 TABLET BY MOUTH EVERY NIGHT 90 Tab 0    baclofen (LIORESAL) 10 mg tablet as needed.  verapamiL (CALAN) 40 mg tablet TAKE 1 TABLET BY MOUTH ONCE DAILY 60 Tab 0    naproxen sodium (ALEVE) 220 mg cap Take  by mouth.  folic acid/multivit-min/lutein (CENTRUM SILVER PO) Take  by mouth.  pantoprazole (PROTONIX) 40 mg tablet Take 1 Tab by mouth daily. 90 Tab 0    potassium 99 mg tablet Take 99 mg by mouth daily. Allergies   Allergen Reactions    Buspar [Buspirone] Other (comments)     Altered mental status       Review of Systems - General ROS: negative for - chills or fever  Cardiovascular ROS: no chest pain or dyspnea on exertion  Respiratory ROS: no cough, shortness of breath, or wheezing    Visit Vitals  /86 (BP 1 Location: Left arm, BP Patient Position: Sitting)   Pulse 85   Temp 97.9 °F (36.6 °C) (Oral)   Resp 13   Ht 5' 4\" (1.626 m)   Wt 173 lb 12.8 oz (78.8 kg)   SpO2 96%   BMI 29.83 kg/m²     General Appearance:  Well developed, well nourished,alert and oriented x 3, and individual in no acute distress. Ears/Nose/Mouth/Throat:   Hearing grossly normal.         Neck: Supple, no lad, no bruits   Chest:   Lungs clear to auscultation bilaterally. Cardiovascular:  Regular rate and rhythm, S1, S2 normal, no murmur. Abdomen:   Soft, non-tender, bowel sounds are active. Extremities: No edema bilaterally. Bilateral feet examined and no breakdown proprioception intact to direction, excellent pulses dorsal talus bilaterally. Skin: Warm and dry, no suspicious lesions                 Diagnoses and all orders for this visit:    1. Mixed hyperlipidemia patient is on pravastatin  Her results from December 2019 were not a good reflection of her pravastatin at 20 mg because she was not compliant with it. She has eaten today and will not get her lipids.   She is aware to continue on her pravastatin    -      DIABETES FOOT EXAM    2. Essential hypertension  Stable  Continue meds  -     triamterene-hydroCHLOROthiazide (MAXZIDE) 37.5-25 mg per tablet; Take 1 Tab by mouth daily. Indications: high blood pressure  -     MAGNESIUM; Future  -      DIABETES FOOT EXAM    3. Controlled type 2 diabetes mellitus without complication, without long-term current use of insulin ([facility]  Patient will initiate metformin if hemoglobin A1c is greater than 6.4. Other orders  -     metFORMIN (GLUCOPHAGE) 500 mg tablet; Take 1 Tab by mouth daily (with breakfast). Her diabetic foot exam was within normal limits.  -     HEMOGLOBIN A1C WITH EAG; Future  -     METABOLIC PANEL, BASIC; Future  -      DIABETES FOOT EXA    7. Comprehensive diabetic foot examination, type 2 DM, encounter for ([facility]  -      DIABETES FOOT EXAM              8. Medicare annual wellness visit, subsequent  -     diph,pertuss,acel,,tetanus vac,PF, (ADACEL) 2 Lf-(2.5-5-3-5 mcg)-5Lf/0.5 mL syrg vaccine; 0.5 mL by IntraMUSCular route once for 1 dose. -     pneumococcal 13 aneesh conj dip (Prevnar 13, PF,) 0.5 mL syrg injection; 0.5 mL by IntraMUSCular route once for 1 dose.  -     varicella-zoster recombinant, PF, (Shingrix, PF,) 50 mcg/0.5 mL susr injection; 0.5mL by IntraMUSCular route once now and then repeat in 2-6 months  -     REFERRAL TO OPHTHALMOLOGY    9. Screening for depression  -     DEPRESSION SCREEN ANNUAL    10. Screen for colon cancer  -     REFERRAL FOR COLONOSCOPY; Future    11. Encounter for screening mammogram for malignant neoplasm of breast  -     [facility] MAMMO BI SCREENING INCL CAD; Future    12. Postmenopausal state  -     DEXA BONE DENSITY STUDY AXIAL; Future    13. Type 2 diabetes mellitus without complication, without long-term current use of insulin (HCC)  -     MICROALBUMIN, UR, RAND W/ MICROALB/CREAT RATIO; Future  -     FLU (FLUAD QUAD INFLUENZA VACCINE,QUAD,ADJUVANTED)    14.  Needs flu shot  -     FLU (FLUAD QUAD INFLUENZA VACCINE,QUAD,ADJUVANTED)  -     INFLUENZA VIRUS VAC QUAD,SPLIT,PRESV FREE SYRINGE IM          Patient should not have 2 diabetes diagnoses but was not able to remove on [facility] care. This is the Subsequent Medicare Annual Wellness Exam, performed 12 months or more after the Initial AWV or the last Subsequent AWV    I have reviewed the patient's medical history in detail and updated the computerized patient record. History     Patient Active Problem List   Diagnosis Code    HTN (hypertension) I10    Hyperlipemia E78.5    S/P WILFRID-BSO Z90.710, Z90.722, Z90.79    Gastric ulcer, unspecified as acute or chronic, without mention of hemorrhage, perforation, or obstruction K25.9    Vertigo or labyrinthine disorder H81.90    Pap smear for cervical cancer screening Z12.4    Hx of screening mammography Z92.89    Screening for colon cancer Z12.11    Hypertension I10    Migraine G43.909    GERD (gastroesophageal reflux disease) K21.9    Advanced care planning/counseling discussion Z71.89     Past Medical History:   Diagnosis Date    Arthritis     IN HIPS - resolved since replacement    Brain tumor ([facility]     Pt being monitored by Dr Eulalio Gallo, meningioma    Environmental allergies     GERD (gastroesophageal reflux disease)     High cholesterol     Hypertension     Migraine     \"AFFECTS EYES\"; EPISODES START WITH EAR FULLNESS SENSATION    S/P chemotherapy, time since greater than 12 weeks 1996    Uterine cancer ([facility] 1996     was seen by Dr. Raj Frye Vertigo or labyrinthine disorder 10/31/2011    ENT shunt placed on left side behind ear      Past Surgical History:   Procedure Laterality Date    COLONOSCOPY N/A 11/27/2018    [facility]    ENDOSCOPY, COLON, DIAGNOSTIC  3/12/2010    ([place])-f/u 5 yrs.    [facility]    shunt placed behind left ear to help with vertigo    HX HIP REPLACEMENT Right 2017    HX HYSTERECTOMY  1996    WITH BSO    HX ORTHOPAEDIC      right CHEPE     Current Outpatient Medications   Medication Sig Dispense Refill    diph,pertuss,acel,,tetanus vac,PF, (ADACEL) 2 Lf-(2.5-5-3-5 mcg)-5Lf/0.5 mL syrg vaccine 0.5 mL by IntraMUSCular route once for 1 dose. 0.5 mL 0    pneumococcal 13 aneesh conj dip (Prevnar 13, PF,) 0.5 mL syrg injection 0.5 mL by IntraMUSCular route once for 1 dose. 0.5 mL 0    varicella-zoster recombinant, PF, (Shingrix, PF,) 50 mcg/0.5 mL susr injection 0.5mL by IntraMUSCular route once now and then repeat in 2-6 months 0.5 mL 1    triamterene-hydroCHLOROthiazide (MAXZIDE) 37.5-25 mg per tablet TAKE 1/2  TABLET BY MOUTH DAILY  Indications: high blood pressure 45 Tab 0    pravastatin (PRAVACHOL) 20 mg tablet TAKE 1 TABLET BY MOUTH EVERY NIGHT 90 Tab 0    verapamiL (CALAN) 40 mg tablet TAKE 1 TABLET BY MOUTH ONCE DAILY 60 Tab 0    naproxen sodium (ALEVE) 220 mg cap Take  by mouth.  folic acid/multivit-min/lutein (CENTRUM SILVER PO) Take  by mouth.  pantoprazole (PROTONIX) 40 mg tablet Take 1 Tab by mouth daily. 90 Tab 0    potassium 99 mg tablet Take 99 mg by mouth daily.        Allergies   Allergen Reactions    Buspar [Buspirone] Other (comments)     Altered mental status       Family History   Problem Relation Age of Onset    Cancer Mother         colon    Stroke Mother     Heart Disease Mother     Diabetes Father     Cancer Father         kidney    Diabetes Sister     Elevated Lipids Sister     Heart Disease Sister     Hypertension Sister     Breast Cancer Sister 61    Heart Disease Brother     Breast Cancer Other     Breast Cancer Other     Anesth Problems Neg Hx      Social History     Tobacco Use    Smoking status: Former Smoker     Packs/day: 0.50     Years: 20.00     Pack years: 10.00     Types: Cigarettes     Last attempt to quit: 1994     Years since quittin.7    Smokeless tobacco: Never Used    Tobacco comment: not anymore   Substance Use Topics    Alcohol use: No     Alcohol/week: 0.0 standard drinks       Depression Risk Factor Screening:     3 most recent PHQ Screens 9/14/2020   Little interest or pleasure in doing things Not at all   Feeling down, depressed, irritable, or hopeless Not at all   Total Score PHQ 2 0       Alcohol Risk Screen   Do you average more than 1 drink per night or more than 7 drinks a week:  No    On any one occasion in the past three months have you have had more than 3 drinks containing alcohol:  No        Functional Ability and Level of Safety:   Hearing: Hearing is good. Activities of Daily Living: The home contains: no safety equipment. Patient does total self care     Ambulation: with no difficulty     Fall Risk:  Fall Risk Assessment, last 12 mths 9/14/2020   Able to walk? Yes   Fall in past 12 months? No   Fall with injury? -   Number of falls in past 12 months -   Fall Risk Score -     Abuse Screen:  Patient is not abused       Cognitive Screening   Has your family/caregiver stated any concerns about your memory: no     Cognitive Screening: Normal - Verbal Fluency Test    Patient Care Team   Patient Care Team:  Flor Cabrales MD as PCP - General (Internal Medicine)  Flor Cabrales MD as PCP - [address] [place] [address] Dr Scott Provider  Seferino Daniels MD (Gastroenterology)    Assessment/Plan   Education and counseling provided:  Are appropriate based on today's review and evaluation  End-of-Life planning (with patient's consent)  Colorectal cancer screening tests patient was seen by Dr. ALMAZAN and will have a follow-up colonoscopy in 2023. Cardiovascular screening blood test  Bone mass measurement (DEXA)  Screening for glaucoma patient follow-up with her ophthalmologist close to where she lives. She goes to the [facility] optometrist  Diabetes screening test    Diagnoses and all orders for this visit:    1. Mixed hyperlipidemia    2. Essential hypertension    3.  Controlled type 2 diabetes mellitus without complication, without long-term current use of insulin ([facility]    4. Type 1 diabetes mellitus without complication (HCC)    5. Diabetic peripheral vascular disorder ([facility]    6. Type II diabetes mellitus with complication (HCC)    7. Comprehensive diabetic foot examination, type 2 DM, encounter for [facility])          Health Maintenance Due   Topic Date Due    Foot Exam Q1  04/21/1960    Eye Exam Retinal or Dilated  04[birth year]21/1960    Shingrix Vaccine Age 50> (1 of 2) 04/21/1998    Pneumococcal 65+ years (1 of 1 - PPSV23) 01/01/2018    Medicare Yearly Exam  02/21/2019    MICROALBUMIN Q1  04/30/2020    GLAUCOMA SCREENING Q2Y  06/06/2020    DTaP/Tdap/Td series (2 - Td) 07/01/2020    Breast Cancer Screen Mammogram  07/01/2020    Flu Vaccine (1) 09/01/2020     Aside from  patient's Medicare wellness visit I spent an additional 25 minutes with this patient greater than 50% of time was spent in management and counseling with regards to her cardiovascular risk factors including blood pressure which is optimally controlled but also diabetes and cholesterol. Discussed with patient would initiate metformin if hemoglobin A1c is 6.4 or greater. Also encouraged her to take her pravastatin every day which she was not doing in the past.  We will check her lipids when she is fasting in 3 months. Please note that patient did have her flu vaccine today. It was given by D. Need to get it in the chart correctly.

## 2020-09-14 NOTE — PATIENT INSTRUCTIONS
Medicare Wellness Visit, Female The best way to live healthy is to have a lifestyle where you eat a well-balanced diet, exercise regularly, limit alcohol use, and quit all forms of tobacco/nicotine, if applicable. Regular preventive services are another way to keep healthy. Preventive services (vaccines, screening tests, monitoring & exams) can help personalize your care plan, which helps you manage your own care. Screening tests can find health problems at the earliest stages, when they are easiest to treat. [facility] follows the current, evidence-based guidelines published by the [facility] ([facility]) when recommending preventive services for our patients. Because we follow these guidelines, sometimes recommendations change over time as research supports it. (For example, mammograms used to be recommended annually. Even though Medicare will still pay for an annual mammogram, the newer guidelines recommend a mammogram every two years for women of average risk). Of course, you and your doctor may decide to screen more often for some diseases, based on your risk and your co-morbidities (chronic disease you are already diagnosed with). Preventive services for you include: - Medicare offers their members a free annual wellness visit, which is time for you and your primary care provider to discuss and plan for your preventive service needs. Take advantage of this benefit every year! 
-All adults over the age of 72 should receive the recommended pneumonia vaccines. Current USPSTF guidelines recommend a series of two vaccines for the best pneumonia protection.  
-All adults should have a flu vaccine yearly and a tetanus vaccine every 10 years.  
-All adults age 48 and older should receive the shingles vaccines (series of two vaccines). -All adults age 38-68 who are overweight should have a diabetes screening test once every three years. -All adults born between [birth year] and 1965 should be screened once for Hepatitis C. 
-Other screening tests and preventive services for persons with diabetes include: an eye exam to screen for diabetic retinopathy, a kidney function test, a foot exam, and stricter control over your cholesterol.  
-Cardiovascular screening for adults with routine risk involves an electrocardiogram (ECG) at intervals determined by your doctor.  
-Colorectal cancer screenings should be done for adults age 54-65 with no increased risk factors for colorectal cancer. There are a number of acceptable methods of screening for this type of cancer. Each test has its own benefits and drawbacks. Discuss with your doctor what is most appropriate for you during your annual wellness visit. The different tests include: colonoscopy (considered the best screening method), a fecal occult blood test, a fecal DNA test, and sigmoidoscopy. 
 
-A bone mass density test is recommended when a woman turns 65 to screen for osteoporosis. This test is only recommended one time, as a screening. Some providers will use this same test as a disease monitoring tool if you already have osteoporosis. -Breast cancer screenings are recommended every other year for women of normal risk, age 54-69. 
-Cervical cancer screenings for women over age 72 are only recommended with certain risk factors. Here is a list of your current Health Maintenance items (your personalized list of preventive services) with a due date: 
Health Maintenance Due Topic Date Due  
 Diabetic Foot Care  04/21/1960 [address] [place] [address] Eye Exam  04/21/1960  Shingles Vaccine (1 of 2) 04/21/1998  Pneumococcal Vaccine (1 of 1 - PPSV23) 01/01/2018 [address] [place] [address] Annual Well Visit  02/21/2019  Albumin Urine Test  04/30/2020  Glaucoma Screening   06/06/2020  
 DTaP/Tdap/Td  (2 - Td) 07/01/2020  Mammogram  07/01/2020  Yearly Flu Vaccine (1) 09/01/2020

## 2020-09-15 LAB
ANION GAP SERPL CALC-SCNC: 10 MMOL/L (ref 5–15)
BUN SERPL-MCNC: 18 MG/DL (ref 6–20)
BUN/CREAT SERPL: 18 (ref 12–20)
CALCIUM SERPL-MCNC: 10.1 MG/DL (ref 8.5–10.1)
CHLORIDE SERPL-SCNC: 105 MMOL/L (ref 97–108)
CO2 SERPL-SCNC: 23 MMOL/L (ref 21–32)
CREAT SERPL-MCNC: 0.99 MG/DL (ref 0.55–1.02)
CREAT UR-MCNC: 51.6 MG/DL
EST. AVERAGE GLUCOSE BLD GHB EST-MCNC: 143 MG/DL
GLUCOSE SERPL-MCNC: 95 MG/DL (ref 65–100)
HBA1C MFR BLD: 6.6 % (ref 4–5.6)
MAGNESIUM SERPL-MCNC: 2.2 MG/DL (ref 1.6–2.4)
MICROALBUMIN UR-MCNC: 1.52 MG/DL
MICROALBUMIN/CREAT UR-RTO: 29 MG/G (ref 0–30)
POTASSIUM SERPL-SCNC: 4.2 MMOL/L (ref 3.5–5.1)
SODIUM SERPL-SCNC: 138 MMOL/L (ref 136–145)

## 2020-11-13 ENCOUNTER — OFFICE VISIT (OUTPATIENT)
Dept: INTERNAL MEDICINE CLINIC | Age: 70
End: 2020-11-13
Payer: MEDICARE

## 2020-11-13 VITALS
SYSTOLIC BLOOD PRESSURE: 136 MMHG | HEART RATE: 73 BPM | WEIGHT: 174 LBS | TEMPERATURE: 97.7 F | HEIGHT: 64 IN | BODY MASS INDEX: 29.71 KG/M2 | OXYGEN SATURATION: 96 % | RESPIRATION RATE: 20 BRPM | DIASTOLIC BLOOD PRESSURE: 85 MMHG

## 2020-11-13 DIAGNOSIS — H60.501 ACUTE OTITIS EXTERNA OF RIGHT EAR, UNSPECIFIED TYPE: Primary | ICD-10-CM

## 2020-11-13 PROCEDURE — G8399 PT W/DXA RESULTS DOCUMENT: HCPCS | Performed by: INTERNAL MEDICINE

## 2020-11-13 PROCEDURE — G8427 DOCREV CUR MEDS BY ELIG CLIN: HCPCS | Performed by: INTERNAL MEDICINE

## 2020-11-13 PROCEDURE — 99213 OFFICE O/P EST LOW 20 MIN: CPT | Performed by: INTERNAL MEDICINE

## 2020-11-13 PROCEDURE — G8432 DEP SCR NOT DOC, RNG: HCPCS | Performed by: INTERNAL MEDICINE

## 2020-11-13 PROCEDURE — 1101F PT FALLS ASSESS-DOCD LE1/YR: CPT | Performed by: INTERNAL MEDICINE

## 2020-11-13 PROCEDURE — G9899 SCRN MAM PERF RSLTS DOC: HCPCS | Performed by: INTERNAL MEDICINE

## 2020-11-13 PROCEDURE — G8417 CALC BMI ABV UP PARAM F/U: HCPCS | Performed by: INTERNAL MEDICINE

## 2020-11-13 PROCEDURE — G8754 DIAS BP LESS 90: HCPCS | Performed by: INTERNAL MEDICINE

## 2020-11-13 PROCEDURE — 3017F COLORECTAL CA SCREEN DOC REV: CPT | Performed by: INTERNAL MEDICINE

## 2020-11-13 PROCEDURE — G8752 SYS BP LESS 140: HCPCS | Performed by: INTERNAL MEDICINE

## 2020-11-13 PROCEDURE — G8536 NO DOC ELDER MAL SCRN: HCPCS | Performed by: INTERNAL MEDICINE

## 2020-11-13 PROCEDURE — 1090F PRES/ABSN URINE INCON ASSESS: CPT | Performed by: INTERNAL MEDICINE

## 2020-11-13 RX ORDER — OFLOXACIN 3 MG/ML
5 SOLUTION AURICULAR (OTIC) DAILY
Qty: 5 ML | Refills: 0 | Status: SHIPPED | OUTPATIENT
Start: 2020-11-13 | End: 2021-06-02

## 2020-11-13 NOTE — PROGRESS NOTES
Assessment and Plan   Diagnoses and all orders for this visit:    1. Acute otitis externa of right ear, unspecified type  -     ofloxacin (FLOXIN) 0.3 % otic solution; Administer 5 Drops in right ear daily. Reports that 2-3-week history of right ear pain associated with some frontal sinus pressure. Denies any fever, chills, hearing loss, sore throat, cough, shortness of breath. Erythematous ear canal.  Will treat for otitis externa. If no improvement, consider covering for possible sinus infection. No effusion noted      Benefits, risks, possible drug interactions, and side effects of all new medications were reviewed with the patient. Pt verbalized understanding. Return to clinic: As needed    Roberto Oneil MD  [facility]  11/13/2020    No future appointments. Subjective   Chief Complaint   Ear pain    Renae Rm is a 65 y.o. female         Review of Systems   Constitutional: Negative for chills and fever. Respiratory: Negative for shortness of breath. Cardiovascular: Negative for chest pain. Objective   Vitals:       Visit Vitals  /85 (BP 1 Location: Left arm, BP Patient Position: Sitting)   Pulse 73   Temp 97.7 °F (36.5 °C) (Oral)   Resp 20   Ht 5' 4\" (1.626 m)   Wt 174 lb (78.9 kg)   SpO2 96%   BMI 29.87 kg/m²        Physical Exam  Constitutional:       Appearance: Normal appearance. She is not ill-appearing. HENT:      Right Ear: External ear normal. There is no impacted cerumen. Left Ear: Ear canal and external ear normal.      Ears:      Comments: Ear canal mildly bulging but no TM erythema or serous effusion noted. Right ear canal erythematous. Left TM retracted  Cardiovascular:      Rate and Rhythm: Normal rate. Pulmonary:      Effort: No respiratory distress. Neurological:      Mental Status: She is alert. Gait: Gait normal.   Psychiatric:         Mood and Affect: Mood normal.         Thought Content:  Thought content normal.         Judgment: Judgment normal.          Current Outpatient Medications   Medication Sig    ofloxacin (FLOXIN) 0.3 % otic solution Administer 5 Drops in right ear daily.  triamterene-hydroCHLOROthiazide (MAXZIDE) 37.5-25 mg per tablet Take 1 Tab by mouth daily. Indications: high blood pressure    metFORMIN (GLUCOPHAGE) 500 mg tablet Take 1 Tab by mouth daily (with breakfast).  pravastatin (PRAVACHOL) 20 mg tablet TAKE 1 TABLET BY MOUTH EVERY NIGHT    naproxen sodium (ALEVE) 220 mg cap Take  by mouth.  folic acid/multivit-min/lutein (CENTRUM SILVER PO) Take  by mouth.  pantoprazole (PROTONIX) 40 mg tablet Take 1 Tab by mouth daily.  potassium 99 mg tablet Take 99 mg by mouth daily.  varicella-zoster recombinant, PF, (Shingrix, PF,) 50 mcg/0.5 mL susr injection 0.5mL by IntraMUSCular route once now and then repeat in 2-6 months (Patient taking differently: 0.5mL by IntraMUSCular route once now and then repeat in 2-6 months  Indications: NOT TAKING)    verapamiL (CALAN) 40 mg tablet TAKE 1 TABLET BY MOUTH ONCE DAILY (Patient taking differently: TAKE 1 TABLET BY MOUTH ONCE DAILY  Indications: NOT TAKING)     No current facility-administered medications for this visit.

## 2020-11-20 DIAGNOSIS — H60.501 ACUTE OTITIS EXTERNA OF RIGHT EAR, UNSPECIFIED TYPE: Primary | ICD-10-CM

## 2020-11-20 RX ORDER — AMOXICILLIN AND CLAVULANATE POTASSIUM 875; 125 MG/1; MG/1
1 TABLET, FILM COATED ORAL 2 TIMES DAILY
Qty: 14 TAB | Refills: 0 | Status: SHIPPED | OUTPATIENT
Start: 2020-11-20 | End: 2020-11-27

## 2020-12-17 ENCOUNTER — TELEPHONE (OUTPATIENT)
Dept: INTERNAL MEDICINE CLINIC | Age: 70
End: 2020-12-17

## 2020-12-17 NOTE — TELEPHONE ENCOUNTER
I spoke with patient, she states since Mid October she will experience weakness and feeling of passing out about 1hr - 1hr 30min after she takes Metformin the AM. She states these sx don't occur everyday. She takes her medication with food. She wants to know if there is another medication she can take instead?

## 2020-12-17 NOTE — TELEPHONE ENCOUNTER
----- Message from April SHILPA Yary Chamberlain sent at 12/17/2020  3:32 PM EST -----  Regarding: Dr. Ailin Michel Telephone  Reason for call: Requested a call back   Callback required yes/no and why: Yes   Best contact number(s): [phone]  Details to clarify the request: Pt stated she was prescribed metformin and she's has been feeling out of it and sometimes feeling like she going to pass out she would like to know if she could be prescribed she different medication.

## 2020-12-17 NOTE — TELEPHONE ENCOUNTER
Yes other medications but she really should have  A follow up appt to evaluate this and if needed transition her to another. In the meantime please continue to eat a diabetic diet. I need to make sure there is not another process other than the Metformin causing her symptoms.

## 2020-12-18 NOTE — TELEPHONE ENCOUNTER
Returned call to pt. She has been advised of recommendations. She voices understanding and appt scheduled.

## 2020-12-21 ENCOUNTER — OFFICE VISIT (OUTPATIENT)
Dept: INTERNAL MEDICINE CLINIC | Age: 70
End: 2020-12-21
Payer: MEDICARE

## 2020-12-21 VITALS
DIASTOLIC BLOOD PRESSURE: 79 MMHG | HEART RATE: 77 BPM | BODY MASS INDEX: 29.5 KG/M2 | WEIGHT: 172.8 LBS | HEIGHT: 64 IN | OXYGEN SATURATION: 97 % | SYSTOLIC BLOOD PRESSURE: 124 MMHG | TEMPERATURE: 97.5 F | RESPIRATION RATE: 16 BRPM

## 2020-12-21 DIAGNOSIS — I10 ESSENTIAL HYPERTENSION: ICD-10-CM

## 2020-12-21 DIAGNOSIS — E11.9 CONTROLLED TYPE 2 DIABETES MELLITUS WITHOUT COMPLICATION, WITHOUT LONG-TERM CURRENT USE OF INSULIN (HCC): Primary | ICD-10-CM

## 2020-12-21 DIAGNOSIS — R42 VERTIGO: ICD-10-CM

## 2020-12-21 PROCEDURE — 1101F PT FALLS ASSESS-DOCD LE1/YR: CPT | Performed by: INTERNAL MEDICINE

## 2020-12-21 PROCEDURE — 3017F COLORECTAL CA SCREEN DOC REV: CPT | Performed by: INTERNAL MEDICINE

## 2020-12-21 PROCEDURE — G8432 DEP SCR NOT DOC, RNG: HCPCS | Performed by: INTERNAL MEDICINE

## 2020-12-21 PROCEDURE — G8399 PT W/DXA RESULTS DOCUMENT: HCPCS | Performed by: INTERNAL MEDICINE

## 2020-12-21 PROCEDURE — 99214 OFFICE O/P EST MOD 30 MIN: CPT | Performed by: INTERNAL MEDICINE

## 2020-12-21 PROCEDURE — 2022F DILAT RTA XM EVC RTNOPTHY: CPT | Performed by: INTERNAL MEDICINE

## 2020-12-21 PROCEDURE — G8427 DOCREV CUR MEDS BY ELIG CLIN: HCPCS | Performed by: INTERNAL MEDICINE

## 2020-12-21 PROCEDURE — G8754 DIAS BP LESS 90: HCPCS | Performed by: INTERNAL MEDICINE

## 2020-12-21 PROCEDURE — G8536 NO DOC ELDER MAL SCRN: HCPCS | Performed by: INTERNAL MEDICINE

## 2020-12-21 PROCEDURE — G8417 CALC BMI ABV UP PARAM F/U: HCPCS | Performed by: INTERNAL MEDICINE

## 2020-12-21 PROCEDURE — G9899 SCRN MAM PERF RSLTS DOC: HCPCS | Performed by: INTERNAL MEDICINE

## 2020-12-21 PROCEDURE — G8752 SYS BP LESS 140: HCPCS | Performed by: INTERNAL MEDICINE

## 2020-12-21 PROCEDURE — 3044F HG A1C LEVEL LT 7.0%: CPT | Performed by: INTERNAL MEDICINE

## 2020-12-21 PROCEDURE — 1090F PRES/ABSN URINE INCON ASSESS: CPT | Performed by: INTERNAL MEDICINE

## 2020-12-21 RX ORDER — INSULIN PUMP SYRINGE, 3 ML
EACH MISCELLANEOUS
Qty: 1 KIT | Refills: 0 | Status: SHIPPED | OUTPATIENT
Start: 2020-12-21

## 2020-12-21 NOTE — PROGRESS NOTES
Diagnoses and all orders for this visit:    1. Controlled type 2 diabetes mellitus without complication, without long-term current use of insulin (Formerly [facility])     patient's hemoglobin A1c was 6.2 today    She will try Metformin 500 mg 1/2 tablet if she is still having side effects we will check her blood sugar. We may be able to treat her with diet alone  Follow-up in 3 months or earlier if needed    -     Blood-Glucose Meter monitoring kit; Please check blood sugar when episodes of fatigue    2. Essential hypertension  Well-controlled    3. Vertigo  We discussed Jardiance for cardiovascular protection but patient with concerns regarding dehydration and increased urine output with this medication as it may trigger her vertigo    Follow-up in 3 months or earlier if needed. She will let me know how she is doing on the 1/2 tablet of Metformin. Chief Complaint   Patient presents with    Medication Evaluation     on Metformin      Diabetes  She feels like she has not energy at all. She notes 1/2 hour after taking metforimin she feels tired and can not do anything. She is off metformin for 2 weeks and energy level is much better. She is eating a diabetic diet. She was not having any GI issues related to the Metformin. No chest pain or shortness of breath. She denies any infection symptoms. She denies any foot breakdown      Subjective:   Tam Miranda is a 65 y.o. female with hypertension. Hypertension ROS: taking medications as instructed, no medication side effects noted, no TIA's, no chest pain on exertion, no dyspnea on exertion, no swelling of ankles. New concerns: None. Vertigo    Patient reports she is being followed by ENT. Her symptoms are very disturbing in terms of quality of life.   She does not want any triggers        Past Medical History:   Diagnosis Date    Arthritis     IN HIPS - resolved since replacement    Brain tumor ([facility] [address]     Pt being monitored by Dr Ela Page, meningioma    Environmental allergies     GERD (gastroesophageal reflux disease)     High cholesterol     Hypertension     Migraine     \"AFFECTS EYES\"; EPISODES START WITH EAR FULLNESS SENSATION    S/P chemotherapy, time since greater than 12 weeks     Uterine cancer ([address]      was seen by Dr. Solo Shelton Vertigo or labyrinthine disorder 10/31/2011    ENT shunt placed on left side behind ear     Past Surgical History:   Procedure Laterality Date    COLONOSCOPY N/A 2018    COLONOSCOPY performed by Neto Hamm MD at [address] [place] [address] [place], COLON, DIAGNOSTIC  3/12/2010    ([place])-f/u 5 yrs.    [facility]      shunt placed behind left ear to help with vertigo    HX HIP REPLACEMENT Right 2017    HX HYSTERECTOMY  1996    WITH BSO    HX ORTHOPAEDIC      right CHEPE     Social History     Socioeconomic History    Marital status:      Spouse name: Not on file    Number of children: Not on file    Years of education: Not on file    Highest education level: Not on file   Tobacco Use    Smoking status: Former Smoker     Packs/day: 0.50     Years: 20.00     Pack years: 10.00     Types: Cigarettes     Quit date: 1994     Years since quittin.9    Smokeless tobacco: Never Used    Tobacco comment: not anymore   Substance and Sexual Activity    Alcohol use: No     Alcohol/week: 0.0 standard drinks    Drug use: No    Sexual activity: Not Currently     Comment: no   Social History Narrative    Retired at 71 yo but had to stop working due to vertigo    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        Grandson, 25, great nephew,21,  temporarily living with her        2 daughters both healthy     Family History   Problem Relation Age of Onset    Cancer Mother         colon    Stroke Mother     Heart Disease Mother     Diabetes Father     Cancer Father         kidney    Diabetes Sister     Elevated Lipids Sister     Heart Disease Sister  Hypertension Sister     Breast Cancer Sister 61    Heart Disease Brother     Breast Cancer Other     Breast Cancer Other     Anesth Problems Neg Hx      Current Outpatient Medications   Medication Sig Dispense Refill    triamterene-hydroCHLOROthiazide (MAXZIDE) 37.5-25 mg per tablet Take 1 Tab by mouth daily. Indications: high blood pressure 90 Tab 1    pravastatin (PRAVACHOL) 20 mg tablet TAKE 1 TABLET BY MOUTH EVERY NIGHT 90 Tab 0    naproxen sodium (ALEVE) 220 mg cap Take  by mouth.  folic acid/multivit-min/lutein (CENTRUM SILVER PO) Take  by mouth.  pantoprazole (PROTONIX) 40 mg tablet Take 1 Tab by mouth daily. 90 Tab 0    potassium 99 mg tablet Take 99 mg by mouth daily.  ofloxacin (FLOXIN) 0.3 % otic solution Administer 5 Drops in right ear daily. (Patient taking differently: Administer 5 Drops in right ear daily. Indications: NOT TAKING) 5 mL 0    varicella-zoster recombinant, PF, (Shingrix, PF,) 50 mcg/0.5 mL susr injection 0.5mL by IntraMUSCular route once now and then repeat in 2-6 months (Patient taking differently: 0.5mL by IntraMUSCular route once now and then repeat in 2-6 months  Indications: NOT TAKING) 0.5 mL 1    metFORMIN (GLUCOPHAGE) 500 mg tablet Take 1 Tab by mouth daily (with breakfast). (Patient taking differently: Take 500 mg by mouth daily (with breakfast).  Indications: NOT TAKING) 90 Tab 0    verapamiL (CALAN) 40 mg tablet TAKE 1 TABLET BY MOUTH ONCE DAILY (Patient taking differently: TAKE 1 TABLET BY MOUTH ONCE DAILY  Indications: NOT TAKING) 60 Tab 0     Allergies   Allergen Reactions    Buspar [Buspirone] Other (comments)     Altered mental status       Review of Systems - General ROS: negative for - chills or fever  Cardiovascular ROS: no chest pain or dyspnea on exertion  Respiratory ROS: no cough, shortness of breath, or wheezing    Visit Vitals  /79 (BP 1 Location: Left arm, BP Patient Position: Sitting)   Pulse 77   Temp 97.5 °F (36.4 °C) (Oral)   Resp 16   Ht 5' 4\" (1.626 m)   Wt 172 lb 12.8 oz (78.4 kg)   SpO2 97%   BMI 29.66 kg/m²           Constitutional: [x] Appears well-developed and well-nourished [x] No apparent distress      [] Abnormal -     Mental status: [x] Alert and awake  [x] Oriented to person/place/time [x] Able to follow commands    [] Abnormal -     Eyes:   EOM    [x]  Normal    [] Abnormal -   Sclera  [x]  Normal    [] Abnormal -          Discharge [x]  None visible   [] Abnormal -     HENT: [x] Normocephalic, atraumatic  [] Abnormal -   [x] Mouth/Throat: Mucous membranes are moist    External Ears [x] Normal  [] Abnormal -    Neck: [x] No visualized mass [] Abnormal -     Pulmonary/Chest: [x] Respiratory effort normal   [x] No visualized signs of difficulty breathing or respiratory distress        [] Abnormal -      Musculoskeletal:   [x] Normal gait with no signs of ataxia         [x] Normal range of motion of neck        [] Abnormal -     Neurological:        [x] No Facial Asymmetry (Cranial nerve 7 motor function) (limited exam due to video visit)          [x] No gaze palsy        [] Abnormal -          Skin:        [x] No significant exanthematous lesions or discoloration noted on facial skin         [] Abnormal -            Psychiatric:       [x] Normal Affect [] Abnormal -        [x] No Hallucinations            ATTENTION:   This medical record was transcribed using an electronic medical records/speech recognition system. Although proofread, it may and can contain electronic, spelling and other errors. Corrections may be executed at a later time. Please feel free to contact us for any clarifications as needed.

## 2020-12-21 NOTE — PATIENT INSTRUCTIONS
DASH Diet: Care Instructions Your Care Instructions The DASH diet is an eating plan that can help lower your blood pressure. DASH stands for Dietary Approaches to Stop Hypertension. Hypertension is high blood pressure. The DASH diet focuses on eating foods that are high in calcium, potassium, and magnesium. These nutrients can lower blood pressure. The foods that are highest in these nutrients are fruits, vegetables, low-fat dairy products, nuts, seeds, and legumes. But taking calcium, potassium, and magnesium supplements instead of eating foods that are high in those nutrients does not have the same effect. The DASH diet also includes whole grains, fish, and poultry. The DASH diet is one of several lifestyle changes your doctor may recommend to lower your high blood pressure. Your doctor may also want you to decrease the amount of sodium in your diet. Lowering sodium while following the DASH diet can lower blood pressure even further than just the DASH diet alone. Follow-up care is a key part of your treatment and safety. Be sure to make and go to all appointments, and call your doctor if you are having problems. It's also a good idea to know your test results and keep a list of the medicines you take. How can you care for yourself at home? Following the DASH diet · Eat 4 to 5 servings of fruit each day. A serving is 1 medium-sized piece of fruit, ½ cup chopped or canned fruit, 1/4 cup dried fruit, or 4 ounces (½ cup) of fruit juice. Choose fruit more often than fruit juice. · Eat 4 to 5 servings of vegetables each day. A serving is 1 cup of lettuce or raw leafy vegetables, ½ cup of chopped or cooked vegetables, or 4 ounces (½ cup) of vegetable juice. Choose vegetables more often than vegetable juice. · Get 2 to 3 servings of low-fat and fat-free dairy each day. A serving is 8 ounces of milk, 1 cup of yogurt, or 1 ½ ounces of cheese. · Eat 6 to 8 servings of grains each day. A serving is 1 slice of bread, 1 ounce of dry cereal, or ½ cup of cooked rice, pasta, or cooked cereal. Try to choose whole-grain products as much as possible. · Limit lean meat, poultry, and fish to 2 servings each day. A serving is 3 ounces, about the size of a deck of cards. · Eat 4 to 5 servings of nuts, seeds, and legumes (cooked dried beans, lentils, and split peas) each week. A serving is 1/3 cup of nuts, 2 tablespoons of seeds, or ½ cup of cooked beans or peas. · Limit fats and oils to 2 to 3 servings each day. A serving is 1 teaspoon of vegetable oil or 2 tablespoons of salad dressing. · Limit sweets and added sugars to 5 servings or less a week. A serving is 1 tablespoon jelly or jam, ½ cup sorbet, or 1 cup of lemonade. · Eat less than 2,300 milligrams (mg) of sodium a day. If you limit your sodium to 1,500 mg a day, you can lower your blood pressure even more. Tips for success · Start small. Do not try to make dramatic changes to your diet all at once. You might feel that you are missing out on your favorite foods and then be more likely to not follow the plan. Make small changes, and stick with them. Once those changes become habit, add a few more changes. · Try some of the following: ? Make it a goal to eat a fruit or vegetable at every meal and at snacks. This will make it easy to get the recommended amount of fruits and vegetables each day. ? Try yogurt topped with fruit and nuts for a snack or healthy dessert. ? Add lettuce, tomato, cucumber, and onion to sandwiches. ? Combine a ready-made pizza crust with low-fat mozzarella cheese and lots of vegetable toppings. Try using tomatoes, squash, spinach, broccoli, carrots, cauliflower, and onions. ? Have a variety of cut-up vegetables with a low-fat dip as an appetizer instead of chips and dip. ? Sprinkle sunflower seeds or chopped almonds over salads. Or try adding chopped walnuts or almonds to cooked vegetables. ? Try some vegetarian meals using beans and peas. Add garbanzo or kidney beans to salads. Make burritos and tacos with mashed nixon beans or black beans. Where can you learn more? Go to http://www.gray.com/ Enter O406 in the search box to learn more about \"DASH Diet: Care Instructions. \" Current as of: December 16, 2019               Content Version: 12.6 © 7817-8196 [employer]. Care instructions adapted under license by [employer] (which disclaims liability or warranty for this information). If you have questions about a medical condition or this instruction, always ask your healthcare professional. [address] any warranty or liability for your use of this information.

## 2021-01-04 ENCOUNTER — TELEPHONE (OUTPATIENT)
Dept: INTERNAL MEDICINE CLINIC | Age: 71
End: 2021-01-04

## 2021-01-04 RX ORDER — BLOOD SUGAR DIAGNOSTIC
STRIP MISCELLANEOUS
Qty: 100 STRIP | Refills: 1 | Status: SHIPPED | OUTPATIENT
Start: 2021-01-04 | End: 2021-01-25 | Stop reason: SDUPTHER

## 2021-01-04 NOTE — TELEPHONE ENCOUNTER
----- Message from Shasha Domingo sent at 1/4/2021  1:13 PM EST -----  Regarding: MD Jean Carlos/Refill  Medication Refill    Caller (if not patient): Self. Relationship of caller (if not patient): NA      Best contact number(s): [phone]      Name of medication and dosage if known: Strips for blood sugar monitor. Is patient out of this medication (yes/no): Yes       Pharmacy name: [address] [place] [address] listed in chart? (yes/no): Yes    Pharmacy phone number: [phone]      Details to clarify the request: Pt states pcp ordered blood sugar monitor but did not receive any strips for it.       Shasha Domingo

## 2021-01-04 NOTE — TELEPHONE ENCOUNTER
I called pharmacy to confirm which Diabetic meter pt has and what strips are needed. Pharmacy states pt needs Accu Chek Chante plus strips. Order sent to the pharmacy.

## 2021-01-25 RX ORDER — BLOOD SUGAR DIAGNOSTIC
STRIP MISCELLANEOUS
Qty: 100 STRIP | Refills: 1 | Status: SHIPPED | OUTPATIENT
Start: 2021-01-25

## 2021-01-26 ENCOUNTER — TRANSCRIBE ORDER (OUTPATIENT)
Dept: MAMMOGRAPHY | Age: 71
End: 2021-01-26

## 2021-01-26 DIAGNOSIS — M81.0 OSTEOPOROSIS: Primary | ICD-10-CM

## 2021-01-26 DIAGNOSIS — Z12.31 VISIT FOR SCREENING MAMMOGRAM: Primary | ICD-10-CM

## 2021-02-03 DIAGNOSIS — I10 ESSENTIAL HYPERTENSION: ICD-10-CM

## 2021-02-03 RX ORDER — TRIAMTERENE/HYDROCHLOROTHIAZID 37.5-25 MG
TABLET ORAL
Qty: 90 TAB | Refills: 1 | Status: SHIPPED | OUTPATIENT
Start: 2021-02-03 | End: 2021-06-14 | Stop reason: SDUPTHER

## 2021-02-25 ENCOUNTER — NURSE TRIAGE (OUTPATIENT)
Dept: OTHER | Facility: CLINIC | Age: 71
End: 2021-02-25

## 2021-02-25 NOTE — TELEPHONE ENCOUNTER
Patient called [facility] with red flag complaint. Call received from  Pt     Brief description of triage: 66 Y/o  With  covid exposure  Pt denies all symptoms  Occurred 3 weeks ago pt was around  Her  Friend  Whom tested positive over 15 days ago   Reports having a  Sore throat   1 day ago that resolved after being around grandchild      Triage indicates for patient to  [address] advice provided, patient verbalizes understanding; denies any other questions or concerns; instructed to call back for any new or worsening symptoms. Attention Provider: Thank you for allowing me to participate in the care of your patient. The patient was connected to triage in response to information from calling the [employer]. Please do not respond through this encounter as the response is not directed to a shared pool. Reason for Disposition   [1] COVID-19 EXPOSURE AND [2] 15 or more days ago AND [3] NO symptoms    Answer Assessment - Initial Assessment Questions  1. COVID-19 CLOSE CONTACT: \"Who is the person with the confirmed or suspected COVID-19 infection that you were exposed to?\"      Yes     2. PLACE of CONTACT: \"Where were you when you were exposed to COVID-19? \" (e.g., home, school, medical waiting room; which city?)      In the car    3. TYPE of CONTACT: \"How much contact was there? \" (e.g., sitting next to, live in same house, work in same office, same building)      Sitting next to pt     4. DURATION of CONTACT: \"How long were you in contact with the COVID-19 patient? \" (e.g., a few seconds, passed by person, a few minutes, 15 minutes or longer, live with the patient)        Had breakfast with pt    5. MASK: \"Were you wearing a mask? \" \"Was the other person wearing a mask? \" Note: wearing a mask reduces the risk of an otherwise close contact. Yes    6. DATE of CONTACT: \"When did you have contact with a COVID-19 patient? \" (e.g., how many days ago)      *No Answer*  7. COMMUNITY SPREAD: Denise Busby there lots of cases of COVID-19 (community spread) where you live? \" (See public health department website, if unsure)        *No Answer*  8. SYMPTOMS: \"Do you have any symptoms? \" (e.g., fever, cough, breathing difficulty, loss of taste or smell)      *No Answer*  9. PREGNANCY OR POSTPARTUM: \"Is there any chance you are pregnant? \" \"When was your last menstrual period? \" \"Did you deliver in the last 2 weeks? \"      N/a      10. HIGH RISK: \"Do you have any heart or lung problems? Do you have a weak immune system? \" (e.g., heart failure, COPD, asthma, HIV positive, chemotherapy, renal failure, diabetes mellitus, sickle cell anemia, obesity)          Denies     11. TRAVEL: \"Have you traveled out of the country recently? \" If so, \"When and where? \" Also ask about out-of-state travel, since the CDC has identified some high-risk cities for community spread in the  Note: Travel becomes less relevant if there is widespread community transmission where the patient lives.         Denies,    Protocols used: CORONAVIRUS (COVID-19) EXPOSURE-ADULT-

## 2021-03-18 ENCOUNTER — HOSPITAL ENCOUNTER (OUTPATIENT)
Dept: MAMMOGRAPHY | Age: 71
Discharge: HOME | End: 2021-03-18
Payer: MEDICARE

## 2021-03-18 ENCOUNTER — APPOINTMENT (OUTPATIENT)
Dept: MAMMOGRAPHY | Age: 71
End: 2021-03-18
Payer: MEDICARE

## 2021-03-18 DIAGNOSIS — M81.0 OSTEOPOROSIS: ICD-10-CM

## 2021-03-18 PROCEDURE — 77080 DXA BONE DENSITY AXIAL: CPT

## 2021-06-02 ENCOUNTER — OFFICE VISIT (OUTPATIENT)
Dept: INTERNAL MEDICINE CLINIC | Age: 71
End: 2021-06-02
Payer: MEDICARE

## 2021-06-02 VITALS
BODY MASS INDEX: 30.05 KG/M2 | DIASTOLIC BLOOD PRESSURE: 78 MMHG | HEIGHT: 64 IN | OXYGEN SATURATION: 97 % | TEMPERATURE: 97.2 F | SYSTOLIC BLOOD PRESSURE: 122 MMHG | HEART RATE: 72 BPM | WEIGHT: 176 LBS

## 2021-06-02 DIAGNOSIS — Z12.39 BREAST SCREENING: ICD-10-CM

## 2021-06-02 DIAGNOSIS — D32.9 MENINGIOMA (HCC): Primary | ICD-10-CM

## 2021-06-02 DIAGNOSIS — E78.2 MIXED HYPERLIPIDEMIA: ICD-10-CM

## 2021-06-02 DIAGNOSIS — R71.8 MICROCYTOSIS: ICD-10-CM

## 2021-06-02 DIAGNOSIS — E11.9 CONTROLLED TYPE 2 DIABETES MELLITUS WITHOUT COMPLICATION, WITHOUT LONG-TERM CURRENT USE OF INSULIN (HCC): ICD-10-CM

## 2021-06-02 DIAGNOSIS — I10 ESSENTIAL HYPERTENSION: ICD-10-CM

## 2021-06-02 PROCEDURE — G8417 CALC BMI ABV UP PARAM F/U: HCPCS | Performed by: INTERNAL MEDICINE

## 2021-06-02 PROCEDURE — G8752 SYS BP LESS 140: HCPCS | Performed by: INTERNAL MEDICINE

## 2021-06-02 PROCEDURE — 2022F DILAT RTA XM EVC RTNOPTHY: CPT | Performed by: INTERNAL MEDICINE

## 2021-06-02 PROCEDURE — G8510 SCR DEP NEG, NO PLAN REQD: HCPCS | Performed by: INTERNAL MEDICINE

## 2021-06-02 PROCEDURE — 1090F PRES/ABSN URINE INCON ASSESS: CPT | Performed by: INTERNAL MEDICINE

## 2021-06-02 PROCEDURE — 99214 OFFICE O/P EST MOD 30 MIN: CPT | Performed by: INTERNAL MEDICINE

## 2021-06-02 PROCEDURE — 3046F HEMOGLOBIN A1C LEVEL >9.0%: CPT | Performed by: INTERNAL MEDICINE

## 2021-06-02 PROCEDURE — G8399 PT W/DXA RESULTS DOCUMENT: HCPCS | Performed by: INTERNAL MEDICINE

## 2021-06-02 PROCEDURE — G8536 NO DOC ELDER MAL SCRN: HCPCS | Performed by: INTERNAL MEDICINE

## 2021-06-02 PROCEDURE — G9899 SCRN MAM PERF RSLTS DOC: HCPCS | Performed by: INTERNAL MEDICINE

## 2021-06-02 PROCEDURE — 1101F PT FALLS ASSESS-DOCD LE1/YR: CPT | Performed by: INTERNAL MEDICINE

## 2021-06-02 PROCEDURE — 3017F COLORECTAL CA SCREEN DOC REV: CPT | Performed by: INTERNAL MEDICINE

## 2021-06-02 PROCEDURE — G8427 DOCREV CUR MEDS BY ELIG CLIN: HCPCS | Performed by: INTERNAL MEDICINE

## 2021-06-02 PROCEDURE — G8754 DIAS BP LESS 90: HCPCS | Performed by: INTERNAL MEDICINE

## 2021-06-02 RX ORDER — VERAPAMIL HYDROCHLORIDE 40 MG/1
TABLET ORAL
Qty: 60 TABLET | Refills: 0
Start: 2021-06-02

## 2021-06-02 RX ORDER — PRAVASTATIN SODIUM 20 MG/1
TABLET ORAL
Qty: 90 TABLET | Refills: 0 | Status: SHIPPED | OUTPATIENT
Start: 2021-06-02 | End: 2021-06-21 | Stop reason: SINTOL

## 2021-06-02 NOTE — PROGRESS NOTES
Identified pt with two pt identifiers(name and ). Reviewed record in preparation for visit and have obtained necessary documentation. Chief Complaint   Patient presents with    Follow-up        Vitals:    21 0823   BP: 124/80   Pulse: 72   Temp: 97.2 °F (36.2 °C)   TempSrc: Temporal   SpO2: 97%   Weight: 176 lb (79.8 kg)   Height: 5' 4\" (1.626 m)   PainSc:   0 - No pain       Health Maintenance Due   Topic    Eye Exam Retinal or Dilated     COVID-19 Vaccine (1)    Shingrix Vaccine Age 50> (1 of 2)    Pneumococcal 65+ years (1 of 1 - PPSV23)    DTaP/Tdap/Td series (2 - Td or Tdap)    Breast Cancer Screen Mammogram     Lipid Screen        Coordination of Care Questionnaire:  :   1) Have you been to an emergency room, urgent care, or hospitalized since your last visit? If yes, where when, and reason for visit? No      2. Have seen or consulted any other health care provider since your last visit? If yes, where when, and reason for visit?   No

## 2021-06-02 NOTE — PROGRESS NOTES
Diagnoses and all orders for this visit:    1. Meningioma ([facility] [address]  Followed by Dr. Miriam Turcios and stable      2. Mixed hyperlipidemia  She is tolerating pravastatin 20 mg 3 times a week. She has not had her LDL checked since being on this dose. 3. Essential hypertension  Stable continue meds      4. Controlled type 2 diabetes mellitus without complication, without long-term current use of insulin ([address]  Stable continue meds     colonscopy 2020  Mammogram ordered  bmd      She will follow-up with me in 3 months Which is September for her Medicare wellness visit. She may have this done with [facility]. If she does with [facility] then she will follow-up with me in December 2021 unless her labs are abnormal then she will follow-up sooner. Chief Complaint   Patient presents with    Follow-up    Diabetes    Hypertension       Subjective:   Alex Abraham is a 66 y.o. female with hypertension. Hypertension ROS: taking medications as instructed, no medication side effects noted, no TIA's, no chest pain on exertion, no dyspnea on exertion, no swelling of ankles. New concerns: she notes 1 episode of low bp. One month ago BP fluctuation 145/82 then 126/74. For the past month normal.  No vertigo when bp elevated      Vertigo  Followed by dr. Miriam Turcios. She had MRI and menningioma is stable. Dec last vertigo episode      SUBJECTIVE:  66 y.o. female for follow up of diabetes. Diabetic Review of Systems - medication compliance: compliant all of the time, diabetic diet compliance: compliant most of the time, home glucose monitoring: she gets 160 in am but has some OJ first, further diabetic ROS: no polyuria or polydipsia, no chest pain, dyspnea or TIA's, no numbness, tingling or pain in extremities, last eye exam approximately 3/2021 ago, acute symptoms are none.   Other symptoms and concerns: she does not see podiatry and no sx  Foot exam      Cholesterol  She is tolerating 20 mg pravastatin THREE times/week but sometimes twice/week. She notes slight muscle weakness in her legs. Past Medical History:   Diagnosis Date    Arthritis     IN HIPS - resolved since replacement    Brain tumor ([facility] [address]     Pt being monitored by Dr Jae Mary, meningioma    Environmental allergies     GERD (gastroesophageal reflux disease)     High cholesterol     Hypertension     Migraine     \"AFFECTS EYES\"; EPISODES START WITH EAR FULLNESS SENSATION    S/P chemotherapy, time since greater than 12 weeks     Uterine cancer ([address]      was seen by Dr. Jorge Luis Stone Vertigo or labyrinthine disorder 10/31/2011    ENT shunt placed on left side behind ear     Past Surgical History:   Procedure Laterality Date    COLONOSCOPY N/A 2018    COLONOSCOPY performed by Memo Carr MD at [address] [place] [address] [place], COLON, DIAGNOSTIC  3/12/2010    ([place])-f/u 5 yrs.    [facility] HX HEENT      shunt placed behind left ear to help with vertigo    HX HIP REPLACEMENT Right 2017    HX HYSTERECTOMY  1996    WITH BSO    HX ORTHOPAEDIC      right CHEPE     Social History     Socioeconomic History    Marital status:      Spouse name: Not on file    Number of children: Not on file    Years of education: Not on file    Highest education level: Not on file   Tobacco Use    Smoking status: Former Smoker     Packs/day: 0.50     Years: 20.00     Pack years: 10.00     Types: Cigarettes     Quit date: 1994     Years since quittin.4    Smokeless tobacco: Never Used    Tobacco comment: not anymore   Substance and Sexual Activity    Alcohol use: No     Alcohol/week: 0.0 standard drinks    Drug use: No    Sexual activity: Not Currently     Comment: no   Social History Narrative    Retired at 71 yo but had to stop working due to vertigo    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        Grandson, 25, great nephew,21,  temporarily living with her        2 daughters both healthy Social Determinants of Health     Financial Resource Strain:     Difficulty of Paying Living Expenses:    Food Insecurity:     Worried About Running Out of Food in the Last Year:     [address] in the Last Year:    Transportation Needs:     Lack of Transportation (Medical):  Lack of Transportation (Non-Medical):    Physical Activity:     Days of Exercise per Week:     Minutes of Exercise per Session:    Stress:     Feeling of Stress :    Social Connections:     Frequency of Communication with Friends and Family:     Frequency of Social Gatherings with Friends and Family:     Attends Mu-ism Services:     Active Member of Clubs or Organizations:     Attends Club or Organization Meetings:     Marital Status:      Family History   Problem Relation Age of Onset    Cancer Mother         colon    Stroke Mother     Heart Disease Mother     Diabetes Father     Cancer Father         kidney    Diabetes Sister     Elevated Lipids Sister     Heart Disease Sister     Hypertension Sister     Breast Cancer Sister 61    Heart Disease Brother     Breast Cancer Other     Breast Cancer Other     Anesth Problems Neg Hx      Current Outpatient Medications   Medication Sig Dispense Refill    pravastatin (PRAVACHOL) 20 mg tablet TAKE 1 TABLET BY MOUTH EVERY NIGHT 90 Tab 0    triamterene-hydroCHLOROthiazide (MAXZIDE) 37.5-25 mg per tablet TAKE 1 TABLET BY MOUTH DAILY 90 Tab 1    glucose blood VI test strips (Accu-Chek Chante Plus test strp) strip Please check blood sugar daily 100 Strip 1    metFORMIN (GLUCOPHAGE) 500 mg tablet Take 0.5 Tabs by mouth daily (with breakfast). 90 Tab 0    Blood-Glucose Meter monitoring kit Please check blood sugar when episodes of fatigue 1 Kit 0    verapamiL (CALAN) 40 mg tablet TAKE 1 TABLET BY MOUTH ONCE DAILY (Patient taking differently: TAKE 1 TABLET BY MOUTH ONCE DAILY  Indications: NOT TAKING) 60 Tab 0    naproxen sodium (ALEVE) 220 mg cap Take  by mouth.  folic acid/multivit-min/lutein (CENTRUM SILVER PO) Take  by mouth.  pantoprazole (PROTONIX) 40 mg tablet Take 1 Tab by mouth daily. 90 Tab 0    potassium 99 mg tablet Take 99 mg by mouth daily.        Allergies   Allergen Reactions    Buspar [Buspirone] Other (comments)     Altered mental status       Review of Systems - General ROS: negative for - chills or fever  Cardiovascular ROS: no chest pain or dyspnea on exertion  Respiratory ROS: no cough, shortness of breath, or wheezing    Visit Vitals  /80 (BP 1 Location: Left arm, BP Patient Position: Sitting, BP Cuff Size: Large adult)   Pulse 72   Temp 97.2 °F (36.2 °C) (Temporal)   Ht 5' 4\" (1.626 m)   Wt 176 lb (79.8 kg)   SpO2 97%   BMI 30.21 kg/m²     Constitutional: [x] Appears well-developed and well-nourished [x] No apparent distress      [] Abnormal -     Mental status: [x] Alert and awake  [x] Oriented to person/place/time [x] Able to follow commands    [] Abnormal -     Eyes:   EOM    [x]  Normal    [] Abnormal -   Sclera  [x]  Normal    [] Abnormal -          Discharge [x]  None visible   [] Abnormal -     HENT: [x] Normocephalic, atraumatic  [] Abnormal -   [x] Mouth/Throat: Mucous membranes are moist    External Ears [x] Normal  [] Abnormal -    Neck: [x] No visualized mass [] Abnormal -     Pulmonary/Chest: [x] Respiratory effort normal   [x] No visualized signs of difficulty breathing or respiratory distress        [] Abnormal -      Musculoskeletal:   [x] Normal gait with no signs of ataxia         [x] Normal range of motion of neck        [] Abnormal -     Neurological:        [x] No Facial Asymmetry (Cranial nerve 7 motor function) (limited exam due to video visit)          [x] No gaze palsy        [] Abnormal -          Skin:        [x] No significant exanthematous lesions or discoloration noted on facial skin         [] Abnormal -            Psychiatric:       [x] Normal Affect [] Abnormal -        [x] No Hallucinations      ATTENTION:   This medical record was transcribed using an electronic medical records/speech recognition system. Although proofread, it may and can contain electronic, spelling and other errors. Corrections may be executed at a later time. Please contact us for any clarifications as needed.

## 2021-06-03 LAB
ALBUMIN SERPL-MCNC: 4 G/DL (ref 3.5–5)
ALBUMIN/GLOB SERPL: 1 {RATIO} (ref 1.1–2.2)
ALP SERPL-CCNC: 71 U/L (ref 45–117)
ALT SERPL-CCNC: 24 U/L (ref 12–78)
ANION GAP SERPL CALC-SCNC: 7 MMOL/L (ref 5–15)
AST SERPL-CCNC: 17 U/L (ref 15–37)
BILIRUB SERPL-MCNC: 0.4 MG/DL (ref 0.2–1)
BUN SERPL-MCNC: 18 MG/DL (ref 6–20)
BUN/CREAT SERPL: 18 (ref 12–20)
CALCIUM SERPL-MCNC: 9.5 MG/DL (ref 8.5–10.1)
CHLORIDE SERPL-SCNC: 106 MMOL/L (ref 97–108)
CHOLEST SERPL-MCNC: 239 MG/DL
CK SERPL-CCNC: 233 U/L (ref 26–192)
CO2 SERPL-SCNC: 26 MMOL/L (ref 21–32)
CREAT SERPL-MCNC: 0.99 MG/DL (ref 0.55–1.02)
ERYTHROCYTE [DISTWIDTH] IN BLOOD BY AUTOMATED COUNT: 15.2 % (ref 11.5–14.5)
EST. AVERAGE GLUCOSE BLD GHB EST-MCNC: 148 MG/DL
GLOBULIN SER CALC-MCNC: 3.9 G/DL (ref 2–4)
GLUCOSE SERPL-MCNC: 110 MG/DL (ref 65–100)
HBA1C MFR BLD: 6.8 % (ref 4–5.6)
HCT VFR BLD AUTO: 39.5 % (ref 35–47)
HDLC SERPL-MCNC: 59 MG/DL
HDLC SERPL: 4.1 {RATIO} (ref 0–5)
HGB BLD-MCNC: 12.3 G/DL (ref 11.5–16)
IRON SATN MFR SERPL: 14 % (ref 20–50)
IRON SERPL-MCNC: 50 UG/DL (ref 35–150)
LDLC SERPL CALC-MCNC: 154 MG/DL (ref 0–100)
MCH RBC QN AUTO: 25.7 PG (ref 26–34)
MCHC RBC AUTO-ENTMCNC: 31.1 G/DL (ref 30–36.5)
MCV RBC AUTO: 82.6 FL (ref 80–99)
NRBC # BLD: 0 K/UL (ref 0–0.01)
NRBC BLD-RTO: 0 PER 100 WBC
PLATELET # BLD AUTO: 290 K/UL (ref 150–400)
PMV BLD AUTO: 10.7 FL (ref 8.9–12.9)
POTASSIUM SERPL-SCNC: 3.6 MMOL/L (ref 3.5–5.1)
PROT SERPL-MCNC: 7.9 G/DL (ref 6.4–8.2)
RBC # BLD AUTO: 4.78 M/UL (ref 3.8–5.2)
SODIUM SERPL-SCNC: 139 MMOL/L (ref 136–145)
TIBC SERPL-MCNC: 348 UG/DL (ref 250–450)
TRIGL SERPL-MCNC: 130 MG/DL (ref ?–150)
VLDLC SERPL CALC-MCNC: 26 MG/DL
WBC # BLD AUTO: 5.9 K/UL (ref 3.6–11)

## 2021-06-14 DIAGNOSIS — I10 ESSENTIAL HYPERTENSION: ICD-10-CM

## 2021-06-14 RX ORDER — TRIAMTERENE/HYDROCHLOROTHIAZID 37.5-25 MG
TABLET ORAL
Qty: 90 TABLET | Refills: 0 | Status: SHIPPED | OUTPATIENT
Start: 2021-06-14 | End: 2021-09-16 | Stop reason: SDUPTHER

## 2021-06-16 NOTE — TELEPHONE ENCOUNTER
Patient called in to say her prescription had not been filled. PSR looked at her last med refill request and saw that it had been sent to the pharmacy. PSR advised her to try calling the pharmacy again.

## 2021-06-21 ENCOUNTER — TELEPHONE (OUTPATIENT)
Dept: INTERNAL MEDICINE CLINIC | Age: 71
End: 2021-06-21

## 2021-06-21 RX ORDER — ATORVASTATIN CALCIUM 20 MG/1
20 TABLET, FILM COATED ORAL DAILY
Qty: 30 TABLET | Refills: 3 | Status: SHIPPED | OUTPATIENT
Start: 2021-06-21 | End: 2021-10-04 | Stop reason: SINTOL

## 2021-06-21 NOTE — TELEPHONE ENCOUNTER
----- Message from Cash Lam sent at 6/19/2021 10:32 AM EDT -----  Regarding: Dr. Cuca Tejada  General Message/Vendor Calls    Caller's first and last name: Pt      Reason for call: medication concern      Callback required yes/no and why: Yes, to notify pt if something different can be called in      Best contact number(s): [phone]      Details to clarify the request: Pt calling in regards to her Pravastatin/ She said it's working against her Verapamil. She said it causes her to have diarrhea. She would like to know if Dr. Masoud Milian can give her something different in replace of Pravastatin. Please advise.       Cash Lam

## 2021-06-21 NOTE — TELEPHONE ENCOUNTER
I spoke with patient, she started on 6/2/21 taking Pravastatin daily. Since then her stools have been more loose/watery. The first week they were looked \"black\" pt was also taking an iron vitamin at that time too. Stool isn't black anymore. Pt takes Pravastatin at night with food. Pt denies fever. Pt would like to know if there is another medication she can take instead of Pravastatin. She believes taking the Verapamil and pravastatin together is causing diarrhea/loose stools.

## 2021-06-22 NOTE — TELEPHONE ENCOUNTER
I spoke with patient to advise of pcp's message. She has been taking the Verapmil in the AM. She will monitor sx and call back if things aren't improving.

## 2021-07-26 ENCOUNTER — TRANSCRIBE ORDER (OUTPATIENT)
Dept: SCHEDULING | Age: 71
End: 2021-07-26

## 2021-07-26 DIAGNOSIS — Z12.31 VISIT FOR SCREENING MAMMOGRAM: Primary | ICD-10-CM

## 2021-09-08 NOTE — TELEPHONE ENCOUNTER
PSR called patient and scheduled appointment with PCP on Monday, December 16, 2019 08:00 AM. Patient very thankful for call & appointment. Yes

## 2021-09-14 ENCOUNTER — HOSPITAL ENCOUNTER (OUTPATIENT)
Dept: MAMMOGRAPHY | Age: 71
Discharge: HOME | End: 2021-09-14
Attending: INTERNAL MEDICINE
Payer: MEDICARE

## 2021-09-14 DIAGNOSIS — Z12.31 VISIT FOR SCREENING MAMMOGRAM: ICD-10-CM

## 2021-09-14 DIAGNOSIS — I10 ESSENTIAL HYPERTENSION: ICD-10-CM

## 2021-09-14 PROCEDURE — 77063 BREAST TOMOSYNTHESIS BI: CPT

## 2021-09-14 RX ORDER — TRIAMTERENE/HYDROCHLOROTHIAZID 37.5-25 MG
TABLET ORAL
Qty: 90 TABLET | Refills: 0 | Status: CANCELLED | OUTPATIENT
Start: 2021-09-14

## 2021-09-16 DIAGNOSIS — I10 ESSENTIAL HYPERTENSION: ICD-10-CM

## 2021-09-16 RX ORDER — TRIAMTERENE/HYDROCHLOROTHIAZID 37.5-25 MG
TABLET ORAL
Qty: 90 TABLET | Refills: 0 | Status: SHIPPED | OUTPATIENT
Start: 2021-09-16 | End: 2021-12-29 | Stop reason: SDUPTHER

## 2021-09-16 NOTE — TELEPHONE ENCOUNTER
----- Message from Tatiana Campo sent at 9/16/2021  8:18 AM EDT -----  Regarding: /Telephone  Medication Refill    Caller (if not patient): N/A      Relationship of caller (if not patient): N/A      Best contact number(s): [phone]      Name of medication and dosage if known: \"Triamterene 37.5-25 mg\"        Is patient out of this medication (yes/no): Yes (1 left)      Pharmacy name: [address] [place] [address] listed in chart? (yes/no): Yes  Pharmacy phone number: [phone]      Details to clarify the request: Patient stated she sent in a request on Tuesday hasn't received any info regarding prescription.       Tatiana Campo

## 2021-09-16 NOTE — TELEPHONE ENCOUNTER
Please call patient, no further refills until office visit. If bridge fill is needed due to NOV > 30 days, please place order.  (Liset Milian)

## 2021-10-04 ENCOUNTER — OFFICE VISIT (OUTPATIENT)
Dept: INTERNAL MEDICINE CLINIC | Age: 71
End: 2021-10-04
Payer: MEDICARE

## 2021-10-04 VITALS
OXYGEN SATURATION: 97 % | BODY MASS INDEX: 29.53 KG/M2 | DIASTOLIC BLOOD PRESSURE: 82 MMHG | RESPIRATION RATE: 13 BRPM | SYSTOLIC BLOOD PRESSURE: 128 MMHG | WEIGHT: 173 LBS | HEIGHT: 64 IN | TEMPERATURE: 97.8 F | HEART RATE: 78 BPM

## 2021-10-04 DIAGNOSIS — E11.9 CONTROLLED TYPE 2 DIABETES MELLITUS WITHOUT COMPLICATION, WITHOUT LONG-TERM CURRENT USE OF INSULIN (HCC): ICD-10-CM

## 2021-10-04 DIAGNOSIS — Z00.00 MEDICARE ANNUAL WELLNESS VISIT, SUBSEQUENT: Primary | ICD-10-CM

## 2021-10-04 DIAGNOSIS — Z13.31 SCREENING FOR DEPRESSION: ICD-10-CM

## 2021-10-04 DIAGNOSIS — E78.2 MIXED HYPERLIPIDEMIA: ICD-10-CM

## 2021-10-04 DIAGNOSIS — Z13.39 SCREENING FOR ALCOHOLISM: ICD-10-CM

## 2021-10-04 DIAGNOSIS — I10 ESSENTIAL HYPERTENSION: ICD-10-CM

## 2021-10-04 DIAGNOSIS — Z12.11 SCREEN FOR COLON CANCER: ICD-10-CM

## 2021-10-04 LAB
CREAT UR-MCNC: 103 MG/DL
EST. AVERAGE GLUCOSE BLD GHB EST-MCNC: 140 MG/DL
HBA1C MFR BLD: 6.5 % (ref 4–5.6)
MICROALBUMIN UR-MCNC: 0.9 MG/DL
MICROALBUMIN/CREAT UR-RTO: 9 MG/G (ref 0–30)
UR CULT HOLD, URHOLD: NORMAL

## 2021-10-04 PROCEDURE — G8427 DOCREV CUR MEDS BY ELIG CLIN: HCPCS | Performed by: INTERNAL MEDICINE

## 2021-10-04 PROCEDURE — 1090F PRES/ABSN URINE INCON ASSESS: CPT | Performed by: INTERNAL MEDICINE

## 2021-10-04 PROCEDURE — G8536 NO DOC ELDER MAL SCRN: HCPCS | Performed by: INTERNAL MEDICINE

## 2021-10-04 PROCEDURE — G8417 CALC BMI ABV UP PARAM F/U: HCPCS | Performed by: INTERNAL MEDICINE

## 2021-10-04 PROCEDURE — 99214 OFFICE O/P EST MOD 30 MIN: CPT | Performed by: INTERNAL MEDICINE

## 2021-10-04 PROCEDURE — G9899 SCRN MAM PERF RSLTS DOC: HCPCS | Performed by: INTERNAL MEDICINE

## 2021-10-04 PROCEDURE — G8752 SYS BP LESS 140: HCPCS | Performed by: INTERNAL MEDICINE

## 2021-10-04 PROCEDURE — G8510 SCR DEP NEG, NO PLAN REQD: HCPCS | Performed by: INTERNAL MEDICINE

## 2021-10-04 PROCEDURE — 3017F COLORECTAL CA SCREEN DOC REV: CPT | Performed by: INTERNAL MEDICINE

## 2021-10-04 PROCEDURE — 1101F PT FALLS ASSESS-DOCD LE1/YR: CPT | Performed by: INTERNAL MEDICINE

## 2021-10-04 PROCEDURE — G8399 PT W/DXA RESULTS DOCUMENT: HCPCS | Performed by: INTERNAL MEDICINE

## 2021-10-04 PROCEDURE — 2022F DILAT RTA XM EVC RTNOPTHY: CPT | Performed by: INTERNAL MEDICINE

## 2021-10-04 PROCEDURE — G8754 DIAS BP LESS 90: HCPCS | Performed by: INTERNAL MEDICINE

## 2021-10-04 PROCEDURE — G0439 PPPS, SUBSEQ VISIT: HCPCS | Performed by: INTERNAL MEDICINE

## 2021-10-04 PROCEDURE — 3044F HG A1C LEVEL LT 7.0%: CPT | Performed by: INTERNAL MEDICINE

## 2021-10-04 RX ORDER — PRAVASTATIN SODIUM 10 MG/1
10 TABLET ORAL
Qty: 90 TABLET | Refills: 0
Start: 2021-10-04 | End: 2022-02-03

## 2021-10-04 RX ORDER — ZOSTER VACCINE RECOMBINANT, ADJUVANTED 50 MCG/0.5
KIT INTRAMUSCULAR
Qty: 0.5 ML | Refills: 1 | Status: SHIPPED | OUTPATIENT
Start: 2021-10-04

## 2021-10-04 RX ORDER — ZINC GLUCONATE 10 MG
LOZENGE ORAL
COMMUNITY

## 2021-10-04 NOTE — PATIENT INSTRUCTIONS
Medicare Wellness Visit, Female     The best way to live healthy is to have a lifestyle where you eat a well-balanced diet, exercise regularly, limit alcohol use, and quit all forms of tobacco/nicotine, if applicable. Regular preventive services are another way to keep healthy. Preventive services (vaccines, screening tests, monitoring & exams) can help personalize your care plan, which helps you manage your own care. Screening tests can find health problems at the earliest stages, when they are easiest to treat. [facility] follows the current, evidence-based guidelines published by the [facility] ([facility]) when recommending preventive services for our patients. Because we follow these guidelines, sometimes recommendations change over time as research supports it. (For example, mammograms used to be recommended annually. Even though Medicare will still pay for an annual mammogram, the newer guidelines recommend a mammogram every two years for women of average risk). Of course, you and your doctor may decide to screen more often for some diseases, based on your risk and your co-morbidities (chronic disease you are already diagnosed with). Preventive services for you include:  - Medicare offers their members a free annual wellness visit, which is time for you and your primary care provider to discuss and plan for your preventive service needs. Take advantage of this benefit every year!  -All adults over the age of 72 should receive the recommended pneumonia vaccines. Current USPSTF guidelines recommend a series of two vaccines for the best pneumonia protection.   -All adults should have a flu vaccine yearly and a tetanus vaccine every 10 years.   -All adults age 48 and older should receive the shingles vaccines (series of two vaccines).       -All adults age 38-68 who are overweight should have a diabetes screening test once every three years.   -All adults born between [birth year] and 1967 should be screened once for Hepatitis C.  -Other screening tests and preventive services for persons with diabetes include: an eye exam to screen for diabetic retinopathy, a kidney function test, a foot exam, and stricter control over your cholesterol.   -Cardiovascular screening for adults with routine risk involves an electrocardiogram (ECG) at intervals determined by your doctor.   -Colorectal cancer screenings should be done for adults age 54-65 with no increased risk factors for colorectal cancer. There are a number of acceptable methods of screening for this type of cancer. Each test has its own benefits and drawbacks. Discuss with your doctor what is most appropriate for you during your annual wellness visit. The different tests include: colonoscopy (considered the best screening method), a fecal occult blood test, a fecal DNA test, and sigmoidoscopy.    -A bone mass density test is recommended when a woman turns 65 to screen for osteoporosis. This test is only recommended one time, as a screening. Some providers will use this same test as a disease monitoring tool if you already have osteoporosis. -Breast cancer screenings are recommended every other year for women of normal risk, age 54-69.  -Cervical cancer screenings for women over age 72 are only recommended with certain risk factors.      Here is a list of your current Health Maintenance items (your personalized list of preventive services) with a due date:  Health Maintenance Due   Topic Date Due    Eye Exam  Never done    Shingles Vaccine (1 of 2) Never done    Pneumococcal Vaccine (1 of 1 - PPSV23) 01/01/2018    DTaP/Tdap/Td  (2 - Td or Tdap) 07/01/2020    Yearly Flu Vaccine (1) 09/01/2021    Diabetic Foot Care  09/14/2021    Albumin Urine Test  09/14/2021

## 2021-10-04 NOTE — PROGRESS NOTES
Diagnoses and all orders for this visit:    1. Medicare annual wellness visit, subsequent  Patient presents for follow-up with regards to her diabetes and hypertension but also due for her Medicare wellness. This was completed today. She thinks that she was giving her Pneumovax but per chart review does not show given by our clinic. She will check her pharmacy to see if she has from them. She had a colonoscopy in 2018 and a repeat colonoscopy is scheduled for 2023 with Dr. Isac Foster. She has a family history of colon cancer. colonscopy 2018 she will have a follow-up in 2023  Mammogram ordered completed 2021  bmd within normal limits that she was normal please see connect care in March 2021    -     varicella-zoster recombinant, PF, (Shingrix, PF,) 50 mcg/0.5 mL susr injection; 0.5mL by IntraMUSCular route once now and then repeat in 2-6 months  -     pneumococcal 23-aneesh ps vaccine (PNEUMOVAX 23) 25 mcg/0.5 mL injection; 0.5 mL by IntraMUSCular route once for 1 dose.  -     diph,pertuss,acel,,tetanus vac,PF, (ADACEL) 2 Lf-(2.5-5-3-5 mcg)-5Lf/0.5 mL syrg vaccine; 0.5 mL by IntraMUSCular route once for 1 dose. 2. Mixed hyperlipidemia  She was not able to tolerate atorvastatin and taking once a week. After discussion we will try pravastatin low-dose daily  We will check her fasting cholesterol in 4 months  Shared decision making and LDL goal should be less than 100  -     pravastatin (PRAVACHOL) 10 mg tablet; Take 1 Tablet by mouth nightly. Indications: high cholesterol and high triglycerides    3. Controlled type 2 diabetes mellitus without complication, without long-term current use of insulin (HCC)  Compliant with Metformin 500 mg 1/2 tablet daily  We will check A1c and willing to increase if needed. -     HEMOGLOBIN A1C WITH EAG;  Future  -     MICROALBUMIN, UR, RAND W/ MICROALB/CREAT RATIO; Future  -     REFERRAL TO OPHTHALMOLOGY    4. Screen for colon cancer  As noted above she will follow up with Dr. Isac Foster in 2023 due to her family history of colon cancer    5. Screening for alcoholism  Denies alcohol    6. Screening for depression  Denies depression  Her PHQ 2 score is 0    7. Essential hypertension  Controlled  Initially increased because noted speeding on her way in  On recheck blood pressure normalized  Home readings are also within normal limits  -     pravastatin (PRAVACHOL) 10 mg tablet; Take 1 Tablet by mouth nightly. Indications: high cholesterol and high triglycerides  -     MICROALBUMIN, UR, RAND W/ MICROALB/CREAT RATIO; Future      Patient will follow up in 4 months for her fasting cholesterol on pravastatin 10 mg daily, CMP and A1c  Chief Complaint   Patient presents with    Medication Evaluation     Fasting. Subjective:   Steve Rios is a 66 y.o. female with hypertension. Hypertension ROS: taking medications as instructed, no medication side effects noted, no TIA's, no chest pain on exertion, no dyspnea on exertion, no swelling of ankles. New concerns: She reports she was speeding on her way in today. She reports her blood pressure readings are usually in the 1 20-1 30 range over 80      Vertigo  Followed by dr. Eliot Duverney. She had MRI and menningioma is stable. She is compliant with magnesium verapamil. She has not had any further vertigo symptoms. She will see Dr. Benjamin James in November for a refill of her verapamil. SUBJECTIVE:  66 y.o. female for follow up of diabetes. Diabetic Review of Systems - medication compliance: compliant all of the time, diabetic diet compliance: compliant most of the time, home glucose monitoring: she gets 160 in am but has some OJ first, further diabetic ROS: no polyuria or polydipsia, no chest pain, dyspnea or TIA's, no numbness, tingling or pain in extremities, last eye exam approximately July 2021 ago, acute symptoms are none.   Other symptoms and concerns: she does not see podiatry and no sx  Foot exam  -119  Metformin 500 mg 1/2 tablet      Cholesterol  Pt reports she tried taking atrovsatain but can not take but once a week due to severe pain in her legs and forces her to take alleve due to the pain. She was on pravastatin 20 mg but could not tolerate. She was taking the 20 mg pravastatin three times a week but also experienced the leg pain          Past Medical History:   Diagnosis Date    Arthritis     IN HIPS - resolved since replacement    Brain tumor ([facility]     Pt being monitored by Dr Roberto Guerra, meningioma    Environmental allergies     GERD (gastroesophageal reflux disease)     High cholesterol     Hypertension     Migraine     \"AFFECTS EYES\"; EPISODES START WITH EAR FULLNESS SENSATION    S/P chemotherapy, time since greater than 12 weeks     Uterine cancer ([facility]      was seen by Dr. Harvinder Ortiz Vertigo or labyrinthine disorder 10/31/2011    ENT shunt placed on left side behind ear     Past Surgical History:   Procedure Laterality Date    COLONOSCOPY N/A 2018    COLONOSCOPY performed by Sintia Guidry MD at [address] [place] [address], COLON, DIAGNOSTIC  3/12/2010    ([place])-f/u 5 yrs.    [facility] HX HEENT      shunt placed behind left ear to help with vertigo    HX HIP REPLACEMENT Right 2017    HX HYSTERECTOMY  1996    WITH BSO    HX ORTHOPAEDIC      right CHEPE     Social History     Socioeconomic History    Marital status:      Spouse name: Not on file    Number of children: Not on file    Years of education: Not on file    Highest education level: Not on file   Tobacco Use    Smoking status: Former Smoker     Packs/day: 0.50     Years: 20.00     Pack years: 10.00     Types: Cigarettes     Quit date: 1994     Years since quittin.7    Smokeless tobacco: Never Used    Tobacco comment: not anymore   Substance and Sexual Activity    Alcohol use: No     Alcohol/week: 0.0 standard drinks    Drug use: No    Sexual activity: Not Currently     Comment: no   Social History Narrative    Retired at 69 yo but had to stop working due to vertigo    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        Grandson, 25, great nephew,21,  temporarily living with her        2 daughters both healthy     Social Determinants of Health     Financial Resource Strain:     Difficulty of Paying Living Expenses:    Food Insecurity:     Worried About [address] in the Last Year:    [address] in the Last Year:    Transportation Needs:     Lack of Transportation (Medical):  Lack of Transportation (Non-Medical):    Physical Activity:     Days of Exercise per Week:     Minutes of Exercise per Session:    Stress:     Feeling of Stress :    Social Connections:     Frequency of Communication with Friends and Family:     Frequency of Social Gatherings with Friends and Family:     Attends Druze Services:     Active Member of Clubs or Organizations:     Attends Club or Organization Meetings:     Marital Status:      Family History   Problem Relation Age of Onset    Cancer Mother         colon    Stroke Mother     Heart Disease Mother     Diabetes Father     Cancer Father         kidney    Diabetes Sister     Elevated Lipids Sister     Heart Disease Sister     Hypertension Sister     Breast Cancer Sister 61    Heart Disease Brother     Breast Cancer Other     Breast Cancer Other     Anesth Problems Neg Hx      Current Outpatient Medications   Medication Sig Dispense Refill    magnesium 250 mg tab Take  by mouth.  triamterene-hydroCHLOROthiazide (MAXZIDE) 37.5-25 mg per tablet TAKE 1 TABLET BY MOUTH DAILY 90 Tablet 0    atorvastatin (LIPITOR) 20 mg tablet Take 1 Tablet by mouth daily.  30 Tablet 3    verapamiL (CALAN) 40 mg tablet ENT RX: TAKE 1 TABLET BY MOUTH ONCE DAILY 60 Tablet 0    glucose blood VI test strips (Accu-Chek Chante Plus test strp) strip Please check blood sugar daily 100 Strip 1    metFORMIN (GLUCOPHAGE) 500 mg tablet Take 0.5 Tabs by mouth daily (with breakfast). 90 Tab 0    Blood-Glucose Meter monitoring kit Please check blood sugar when episodes of fatigue 1 Kit 0    naproxen sodium (ALEVE) 220 mg cap Take  by mouth.  folic acid/multivit-min/lutein (CENTRUM SILVER PO) Take  by mouth.  pantoprazole (PROTONIX) 40 mg tablet Take 1 Tab by mouth daily. 90 Tab 0    potassium 99 mg tablet Take 99 mg by mouth daily.        Allergies   Allergen Reactions    Buspar [Buspirone] Other (comments)     Altered mental status       Review of Systems - General ROS: negative for - chills or fever  Cardiovascular ROS: no chest pain or dyspnea on exertion  Respiratory ROS: no cough, shortness of breath, or wheezing    Visit Vitals  BP (!) 146/78 (BP 1 Location: Left upper arm, BP Patient Position: Sitting, BP Cuff Size: Adult)   Pulse 78   Temp 97.8 °F (36.6 °C) (Oral)   Resp 13   Ht 5' 4\" (1.626 m)   Wt 173 lb (78.5 kg)   SpO2 97%   BMI 29.70 kg/m²     Constitutional: [x] Appears well-developed and well-nourished [x] No apparent distress      [] Abnormal -     Mental status: [x] Alert and awake  [x] Oriented to person/place/time [x] Able to follow commands    [] Abnormal -     Eyes:   EOM    [x]  Normal    [] Abnormal -   Sclera  [x]  Normal    [] Abnormal -          Discharge [x]  None visible   [] Abnormal -     HENT: [x] Normocephalic, atraumatic  [] Abnormal -   [x] Mouth/Throat: Mucous membranes are moist    External Ears [x] Normal  [] Abnormal -    Neck: [x] No visualized mass [] Abnormal -     Pulmonary/Chest: [x] Respiratory effort normal   [x] No visualized signs of difficulty breathing or respiratory distress        [] Abnormal -      Musculoskeletal:   [x] Normal gait with no signs of ataxia         [x] Normal range of motion of neck        [] Abnormal -   Right bunion no bunion on left  + DP pulses 5/5   No breakdown of feet    Neurological:        [x] No Facial Asymmetry (Cranial nerve 7 motor function) (limited exam due to video visit)          [x] No gaze palsy        [] Abnormal -          Skin:        [x] No significant exanthematous lesions or discoloration noted on facial skin         [] Abnormal -            Psychiatric:       [x] Normal Affect [] Abnormal -        [x] No Hallucinations                                                               This is the Subsequent Medicare Annual Wellness Exam, performed 12 months or more after the Initial AWV or the last Subsequent AWV    I have reviewed the patient's medical history in detail and updated the computerized patient record. Assessment/Plan   Education and counseling provided:  Are appropriate based on today's review and evaluation  End-of-Life planning (with patient's consent) patient has advanced care planning. Her daughter has this information  Pneumococcal Vaccine she will check with her pharmacy to see if it was given by them. Influenza Vaccine will get her flu vaccine this year  Screening Mammography September 2021 within normal limits we will get next year  Colorectal cancer screening tests her next colonoscopy will be 2023  Bone mass measurement (DEXA) March 2021 her bone density was normal  Screening for glaucoma she will follow up with ophthalmology    1. Mixed hyperlipidemia  2. Controlled type 2 diabetes mellitus without complication, without long-term current use of insulin ([facility]  3. Medicare annual wellness visit, subsequent  -     varicella-zoster recombinant, PF, (Shingrix, PF,) 50 mcg/0.5 mL susr injection; 0.5mL by IntraMUSCular route once now and then repeat in 2-6 months, Print, Disp-0.5 mL, R-1  -     pneumococcal 23-aneesh ps vaccine (PNEUMOVAX 23) 25 mcg/0.5 mL injection; 0.5 mL by IntraMUSCular route once for 1 dose., Print, Disp-0.5 mL, R-0  -     diph,pertuss,acel,,tetanus vac,PF, (ADACEL) 2 Lf-(2.5-5-3-5 mcg)-5Lf/0.5 mL syrg vaccine; 0.5 mL by IntraMUSCular route once for 1 dose., Print, Disp-0.5 mL, R-0  4.  Screen for colon cancer  5. Screening for alcoholism  6. Screening for depression       Depression Risk Factor Screening     3 most recent PHQ Screens 10/4/2021   Little interest or pleasure in doing things Not at all   Feeling down, depressed, irritable, or hopeless Not at all   Total Score PHQ 2 0       Alcohol Risk Screen    Do you average more than 1 drink per night or more than 7 drinks a week:  No    On any one occasion in the past three months have you have had more than 3 drinks containing alcohol:  No        Functional Ability and Level of Safety    Hearing: Hearing is good. Activities of Daily Living: The home contains: no safety equipment. Patient does total self care      Ambulation: with no difficulty     Fall Risk:  Fall Risk Assessment, last 12 mths 6/2/2021   Able to walk? Yes   Fall in past 12 months? 0   Number of falls in past 12 months -   Fall with injury?  -      Abuse Screen:  Patient is not abused       Cognitive Screening    Has your family/caregiver stated any concerns about your memory: no     Cognitive Screening: Normal - Verbal Fluency Test    Health Maintenance Due     Health Maintenance Due   Topic Date Due    Eye Exam Retinal or Dilated  Never done    Shingrix Vaccine Age 50> (1 of 2) Never done    Pneumococcal 65+ years (1 of 1 - PPSV23) 01/01/2018    DTaP/Tdap/Td series (2 - Td or Tdap) 07/01/2020    Flu Vaccine (1) 09/01/2021    Foot Exam Q1  09/14/2021    MICROALBUMIN Q1  09/14/2021       Patient Care Team   Patient Care Team:  Steph Villavicencio MD as PCP - General (Internal Medicine)  Steph Villavicencio MD as PCP - [facility]  Lizbeth Mazariegos MD (Gastroenterology)    History     Patient Active Problem List   Diagnosis Code    Hyperlipemia E78.5    S/P [facility] Z90.710, Z90.722, Z90.79    Gastric ulcer, unspecified as acute or chronic, without mention of hemorrhage, perforation, or obstruction K25.9    Vertigo or labyrinthine disorder H81.90    Pap smear for cervical cancer screening Z12.4    Hx of screening mammography Z92.89    Screening for colon cancer Z12.11    Hypertension I10    Migraine G43.909    GERD (gastroesophageal reflux disease) K21.9    Advanced care planning/counseling discussion Z71.89     Past Medical History:   Diagnosis Date    Arthritis     IN HIPS - resolved since replacement    [facility] ([facility]     Pt being monitored by Dr Calixto Robins, meningioma    Environmental allergies     GERD (gastroesophageal reflux disease)     High cholesterol     Hypertension     Migraine     \"AFFECTS EYES\"; EPISODES START WITH EAR FULLNESS SENSATION    S/P chemotherapy, time since greater than 12 weeks 1996    Uterine cancer ([facility] 1996     was seen by Dr. Jaja Carroll Vertigo or labyrinthine disorder 10/31/2011    ENT shunt placed on left side behind ear      Past Surgical History:   Procedure Laterality Date    COLONOSCOPY N/A 11/27/2018    COLONOSCOPY performed by Ninoska Patel MD at [address] [place] [address] [place], COLON, DIAGNOSTIC  3/12/2010    ([place])-f/u 5 yrs. [facility] HX HEENT  2012    shunt placed behind left ear to help with vertigo    HX HIP REPLACEMENT Right 2017    HX HYSTERECTOMY  1996    WITH BSO    HX ORTHOPAEDIC      right CHEPE     Current Outpatient Medications   Medication Sig Dispense Refill    magnesium 250 mg tab Take  by mouth.  varicella-zoster recombinant, PF, (Shingrix, PF,) 50 mcg/0.5 mL susr injection 0.5mL by IntraMUSCular route once now and then repeat in 2-6 months 0.5 mL 1    pneumococcal 23-aneesh ps vaccine (PNEUMOVAX 23) 25 mcg/0.5 mL injection 0.5 mL by IntraMUSCular route once for 1 dose. 0.5 mL 0    diph,pertuss,acel,,tetanus vac,PF, (ADACEL) 2 Lf-(2.5-5-3-5 mcg)-5Lf/0.5 mL syrg vaccine 0.5 mL by IntraMUSCular route once for 1 dose.  0.5 mL 0    triamterene-hydroCHLOROthiazide (MAXZIDE) 37.5-25 mg per tablet TAKE 1 TABLET BY MOUTH DAILY 90 Tablet 0    verapamiL (CALAN) 40 mg tablet ENT RX: TAKE 1 TABLET BY MOUTH ONCE DAILY 60 Tablet 0    glucose blood VI test strips (Accu-Chek Chante Plus test strp) strip Please check blood sugar daily 100 Strip 1    metFORMIN (GLUCOPHAGE) 500 mg tablet Take 0.5 Tabs by mouth daily (with breakfast). 90 Tab 0    Blood-Glucose Meter monitoring kit Please check blood sugar when episodes of fatigue 1 Kit 0    naproxen sodium (ALEVE) 220 mg cap Take  by mouth.  folic acid/multivit-min/lutein (CENTRUM SILVER PO) Take  by mouth.  pantoprazole (PROTONIX) 40 mg tablet Take 1 Tab by mouth daily. 90 Tab 0    potassium 99 mg tablet Take 99 mg by mouth daily. Allergies   Allergen Reactions    Buspar [Buspirone] Other (comments)     Altered mental status       Family History   Problem Relation Age of Onset    Cancer Mother         colon   Akosua Schreiber Stroke Mother     Heart Disease Mother     Diabetes Father     Cancer Father         kidney    Diabetes Sister     Elevated Lipids Sister     Heart Disease Sister     Hypertension Sister     Breast Cancer Sister 61    Heart Disease Brother     Breast Cancer Other     Breast Cancer Other     Anesth Problems Neg Hx      Social History     Tobacco Use    Smoking status: Former Smoker     Packs/day: 0.50     Years: 20.00     Pack years: 10.00     Types: Cigarettes     Quit date: 1994     Years since quittin.7    Smokeless tobacco: Never Used    Tobacco comment: not anymore   Substance Use Topics    Alcohol use: No     Alcohol/week: 0.0 standard drinks         Ben Pimentel MD         ATTENTION:   This medical record was transcribed using an electronic medical records/speech recognition system. Although proofread, it may and can contain electronic, spelling and other errors. Corrections may be executed at a later time. Please contact us for any clarifications as needed. aside from patient's Medicare wellness visit as above on this date 10/04/21  I have spent 30 minutes reviewing previous notes, test results and face to face with the patient discussing the diagnosis and importance of compliance with the treatment plan as well as documenting on the day of the visit.

## 2021-12-29 DIAGNOSIS — I10 ESSENTIAL HYPERTENSION: ICD-10-CM

## 2021-12-29 RX ORDER — TRIAMTERENE/HYDROCHLOROTHIAZID 37.5-25 MG
TABLET ORAL
Qty: 90 TABLET | Refills: 1 | Status: SHIPPED | OUTPATIENT
Start: 2021-12-29 | End: 2022-09-06 | Stop reason: SDUPTHER

## 2022-02-03 ENCOUNTER — OFFICE VISIT (OUTPATIENT)
Dept: INTERNAL MEDICINE CLINIC | Age: 72
End: 2022-02-03
Payer: MEDICARE

## 2022-02-03 VITALS
RESPIRATION RATE: 14 BRPM | WEIGHT: 174 LBS | BODY MASS INDEX: 29.71 KG/M2 | DIASTOLIC BLOOD PRESSURE: 80 MMHG | HEART RATE: 74 BPM | OXYGEN SATURATION: 98 % | SYSTOLIC BLOOD PRESSURE: 128 MMHG | HEIGHT: 64 IN | TEMPERATURE: 97.7 F

## 2022-02-03 DIAGNOSIS — I10 PRIMARY HYPERTENSION: ICD-10-CM

## 2022-02-03 DIAGNOSIS — I10 ESSENTIAL HYPERTENSION: ICD-10-CM

## 2022-02-03 DIAGNOSIS — D32.9 MENINGIOMA (HCC): ICD-10-CM

## 2022-02-03 DIAGNOSIS — E78.2 MIXED HYPERLIPIDEMIA: ICD-10-CM

## 2022-02-03 DIAGNOSIS — E11.9 CONTROLLED TYPE 2 DIABETES MELLITUS WITHOUT COMPLICATION, WITHOUT LONG-TERM CURRENT USE OF INSULIN (HCC): Primary | ICD-10-CM

## 2022-02-03 LAB
ALBUMIN SERPL-MCNC: 3.9 G/DL (ref 3.5–5)
ALBUMIN/GLOB SERPL: 1 {RATIO} (ref 1.1–2.2)
ALP SERPL-CCNC: 61 U/L (ref 45–117)
ALT SERPL-CCNC: 29 U/L (ref 12–78)
ANION GAP SERPL CALC-SCNC: 9 MMOL/L (ref 5–15)
AST SERPL-CCNC: 15 U/L (ref 15–37)
BILIRUB SERPL-MCNC: 0.4 MG/DL (ref 0.2–1)
BUN SERPL-MCNC: 17 MG/DL (ref 6–20)
BUN/CREAT SERPL: 15 (ref 12–20)
CALCIUM SERPL-MCNC: 10.1 MG/DL (ref 8.5–10.1)
CHLORIDE SERPL-SCNC: 106 MMOL/L (ref 97–108)
CHOLEST SERPL-MCNC: 225 MG/DL
CO2 SERPL-SCNC: 23 MMOL/L (ref 21–32)
CREAT SERPL-MCNC: 1.13 MG/DL (ref 0.55–1.02)
EST. AVERAGE GLUCOSE BLD GHB EST-MCNC: 143 MG/DL
GLOBULIN SER CALC-MCNC: 4.1 G/DL (ref 2–4)
GLUCOSE SERPL-MCNC: 105 MG/DL (ref 65–100)
HBA1C MFR BLD: 6.6 % (ref 4–5.6)
HDLC SERPL-MCNC: 61 MG/DL
HDLC SERPL: 3.7 {RATIO} (ref 0–5)
LDLC SERPL CALC-MCNC: 136.6 MG/DL (ref 0–100)
POTASSIUM SERPL-SCNC: 3.7 MMOL/L (ref 3.5–5.1)
PROT SERPL-MCNC: 8 G/DL (ref 6.4–8.2)
SODIUM SERPL-SCNC: 138 MMOL/L (ref 136–145)
TRIGL SERPL-MCNC: 137 MG/DL (ref ?–150)
VLDLC SERPL CALC-MCNC: 27.4 MG/DL

## 2022-02-03 PROCEDURE — G8417 CALC BMI ABV UP PARAM F/U: HCPCS | Performed by: INTERNAL MEDICINE

## 2022-02-03 PROCEDURE — 1090F PRES/ABSN URINE INCON ASSESS: CPT | Performed by: INTERNAL MEDICINE

## 2022-02-03 PROCEDURE — G8427 DOCREV CUR MEDS BY ELIG CLIN: HCPCS | Performed by: INTERNAL MEDICINE

## 2022-02-03 PROCEDURE — 99214 OFFICE O/P EST MOD 30 MIN: CPT | Performed by: INTERNAL MEDICINE

## 2022-02-03 PROCEDURE — 3017F COLORECTAL CA SCREEN DOC REV: CPT | Performed by: INTERNAL MEDICINE

## 2022-02-03 PROCEDURE — 2022F DILAT RTA XM EVC RTNOPTHY: CPT | Performed by: INTERNAL MEDICINE

## 2022-02-03 PROCEDURE — G9899 SCRN MAM PERF RSLTS DOC: HCPCS | Performed by: INTERNAL MEDICINE

## 2022-02-03 PROCEDURE — G8752 SYS BP LESS 140: HCPCS | Performed by: INTERNAL MEDICINE

## 2022-02-03 PROCEDURE — G8754 DIAS BP LESS 90: HCPCS | Performed by: INTERNAL MEDICINE

## 2022-02-03 PROCEDURE — G8536 NO DOC ELDER MAL SCRN: HCPCS | Performed by: INTERNAL MEDICINE

## 2022-02-03 PROCEDURE — 3046F HEMOGLOBIN A1C LEVEL >9.0%: CPT | Performed by: INTERNAL MEDICINE

## 2022-02-03 PROCEDURE — 1101F PT FALLS ASSESS-DOCD LE1/YR: CPT | Performed by: INTERNAL MEDICINE

## 2022-02-03 PROCEDURE — G8399 PT W/DXA RESULTS DOCUMENT: HCPCS | Performed by: INTERNAL MEDICINE

## 2022-02-03 PROCEDURE — G8510 SCR DEP NEG, NO PLAN REQD: HCPCS | Performed by: INTERNAL MEDICINE

## 2022-02-03 RX ORDER — PRAVASTATIN SODIUM 20 MG/1
20 TABLET ORAL
Qty: 90 TABLET | Refills: 1 | Status: SHIPPED | OUTPATIENT
Start: 2022-02-03

## 2022-02-03 NOTE — PROGRESS NOTES
Diagnoses and all orders for this visit:    1. Controlled type 2 diabetes mellitus without complication, without long-term current use of insulin ([facility] [address]  Historically well controlled on Metformin to 50 mg. We will continue to monitor  Foot exam completed with monofilament test and negative for deficiencies  -     HEMOGLOBIN A1C WITH EAG; Future  -     METABOLIC PANEL, COMPREHENSIVE; Future    2. Primary hypertension  Blood pressure well controlled  Continue meds  -     METABOLIC PANEL, COMPREHENSIVE; Future  -     LIPID PANEL; Future    3. Meningioma [facility])  He is being followed by Luis Armando Nielsen. She will have imaging and next visit  She denies headache or vision changes    4. Mixed hyperlipidemia  No side effects on meds  Continue heart healthy diet and exercise as tolerated           Chief Complaint   Patient presents with    Follow-up     Diabetes  Patient reports she has been taking Metformin 250 mg. She is eating a heart healthy diet overall. She does not have any chest pain or shortness of breath. She does not have any abdominal pain or diarrhea. She has been following with her ophthalmologist once a year. Subjective:   Meka Flannery is a 66 y.o. female with hypertension. Hypertension ROS: taking medications as instructed, no medication side effects noted, no TIA's, no chest pain on exertion, no dyspnea on exertion, no swelling of ankles. New concerns: None. Meningioma  Patient denies headaches. She will be seen by Dr. Estuardo Hatch for follow-up. Hyperlipidemia  She has been taking pravastatin 10 mg. No muscle ache or muscle pains. She has been eating a heart healthy diabetic type diet.         Past Medical History:   Diagnosis Date    Arthritis     IN HIPS - resolved since replacement    Brain tumor ([facility] [address]     Pt being monitored by Dr Katerina Amanda, meningioma    Environmental allergies     GERD (gastroesophageal reflux disease)     High cholesterol     Hypertension     Migraine     \"AFFECTS EYES\"; EPISODES START WITH EAR FULLNESS SENSATION    S/P chemotherapy, time since greater than 12 weeks     Uterine cancer ([facility] [address]      was seen by Dr. 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 [address], COLON, DIAGNOSTIC  3/12/2010    ([place])-f/u 5 yrs.    [facility] HX HEENT      shunt placed behind left ear to help with vertigo    HX HIP REPLACEMENT Right 2017    HX HYSTERECTOMY  1996    WITH BSO    HX ORTHOPAEDIC      right CHEPE     Social History     Socioeconomic History    Marital status:    Tobacco Use    Smoking status: Former Smoker     Packs/day: 0.50     Years: 20.00     Pack years: 10.00     Types: Cigarettes     Quit date: 1994     Years since quittin.1    Smokeless tobacco: Never Used    Tobacco comment: not anymore   Substance and Sexual Activity    Alcohol use: No     Alcohol/week: 0.0 standard drinks    Drug use: No    Sexual activity: Not Currently     Comment: no   Social History Narrative    Retired at 69 yo but had to stop working due to vertigo    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        Grandson, 25, great nephew,21,  temporarily living with her        2 daughters both healthy     Family History   Problem Relation Age of Onset    Cancer Mother         colon    Stroke Mother     Heart Disease Mother     Diabetes Father     Cancer Father         kidney    Diabetes Sister     Elevated Lipids Sister     Heart Disease Sister     Hypertension Sister     Breast Cancer Sister 61    Heart Disease Brother     Breast Cancer Other     Breast Cancer Other     Anesth Problems Neg Hx      Current Outpatient Medications   Medication Sig Dispense Refill    triamterene-hydroCHLOROthiazide (Dorothy Porter) 37.5-25 mg per tablet TAKE 1 TABLET BY MOUTH DAILY 90 Tablet 1    magnesium 250 mg tab Take  by mouth.  verapamiL (CALAN) 40 mg tablet ENT RX: TAKE 1 TABLET BY MOUTH ONCE DAILY 60 Tablet 0    glucose blood VI test strips (Accu-Chek Chante Plus test strp) strip Please check blood sugar daily 100 Strip 1    metFORMIN (GLUCOPHAGE) 500 mg tablet Take 0.5 Tabs by mouth daily (with breakfast). 90 Tab 0    naproxen sodium (ALEVE) 220 mg cap Take  by mouth.  folic acid/multivit-min/lutein (CENTRUM SILVER PO) Take  by mouth.  pantoprazole (PROTONIX) 40 mg tablet Take 1 Tab by mouth daily. 90 Tab 0    potassium 99 mg tablet Take 99 mg by mouth daily.  varicella-zoster recombinant, PF, (Shingrix, PF,) 50 mcg/0.5 mL susr injection 0.5mL by IntraMUSCular route once now and then repeat in 2-6 months 0.5 mL 1    pravastatin (PRAVACHOL) 10 mg tablet Take 1 Tablet by mouth nightly.  Indications: high cholesterol and high triglycerides 90 Tablet 0    Blood-Glucose Meter monitoring kit Please check blood sugar when episodes of fatigue (Patient not taking: Reported on 2/3/2022) 1 Kit 0     Allergies   Allergen Reactions    Buspar [Buspirone] Other (comments)     Altered mental status       Review of Systems - General ROS: negative for - chills or fever  Cardiovascular ROS: no chest pain or dyspnea on exertion  Respiratory ROS: no cough, shortness of breath, or wheezing    Visit Vitals  BP (!) 145/87 (BP 1 Location: Left upper arm, BP Patient Position: Sitting, BP Cuff Size: Adult)   Pulse 74   Temp 97.7 °F (36.5 °C) (Oral)   Resp 14   Ht 5' 4\" (1.626 m)   Wt 174 lb (78.9 kg)   SpO2 98%   BMI 29.87 kg/m²     Constitutional: [x] Appears well-developed and well-nourished [x] No apparent distress      [] Abnormal -     Mental status: [x] Alert and awake  [x] Oriented to person/place/time [x] Able to follow commands    [] Abnormal -     Eyes:   EOM    [x]  Normal    [] Abnormal -   Sclera  [x]  Normal    [] Abnormal -          Discharge [x]  None visible   [] Abnormal -     HENT: [x] Normocephalic, atraumatic  [] Abnormal -   [x] Mouth/Throat: Mucous membranes are moist    External Ears [x] Normal  [] Abnormal -    Neck: [x] No visualized mass [] Abnormal -     Pulmonary/Chest: [x] Respiratory effort normal   [x] No visualized signs of difficulty breathing or respiratory distress        [] Abnormal -      Musculoskeletal:   [x] Normal gait with no signs of ataxia         [x] Normal range of motion of neck        [] Abnormal -     Neurological:        [x] No Facial Asymmetry (Cranial nerve 7 motor function) (limited exam due to video visit)          [x] No gaze palsy        [] Abnormal -          Skin:        [x] No significant exanthematous lesions or discoloration noted on facial skin         [] Abnormal -            Psychiatric:       [x] Normal Affect [] Abnormal -        [x] No Hallucinations      ATTENTION:   This medical record was transcribed using an electronic medical records/speech recognition system. Although proofread, it may and can contain electronic, spelling and other errors. Corrections may be executed at a later time. Please contact us for any clarifications as needed.

## 2022-03-20 PROBLEM — Z71.89 ADVANCED CARE PLANNING/COUNSELING DISCUSSION: Status: ACTIVE | Noted: 2017-02-02

## 2022-08-01 ENCOUNTER — OFFICE VISIT (OUTPATIENT)
Dept: INTERNAL MEDICINE CLINIC | Age: 72
End: 2022-08-01
Payer: MEDICARE

## 2022-08-01 VITALS
RESPIRATION RATE: 14 BRPM | OXYGEN SATURATION: 98 % | SYSTOLIC BLOOD PRESSURE: 118 MMHG | HEIGHT: 64 IN | WEIGHT: 175 LBS | BODY MASS INDEX: 29.88 KG/M2 | TEMPERATURE: 97.8 F | HEART RATE: 74 BPM | DIASTOLIC BLOOD PRESSURE: 82 MMHG

## 2022-08-01 DIAGNOSIS — E78.2 MIXED HYPERLIPIDEMIA: ICD-10-CM

## 2022-08-01 DIAGNOSIS — E11.9 CONTROLLED TYPE 2 DIABETES MELLITUS WITHOUT COMPLICATION, WITHOUT LONG-TERM CURRENT USE OF INSULIN (HCC): ICD-10-CM

## 2022-08-01 DIAGNOSIS — I10 PRIMARY HYPERTENSION: Primary | ICD-10-CM

## 2022-08-01 DIAGNOSIS — N18.31 STAGE 3A CHRONIC KIDNEY DISEASE (HCC): ICD-10-CM

## 2022-08-01 PROBLEM — N18.30 CHRONIC RENAL DISEASE, STAGE III (HCC): Status: ACTIVE | Noted: 2022-08-01

## 2022-08-01 LAB
ALBUMIN SERPL-MCNC: 3.9 G/DL (ref 3.5–5)
ALBUMIN/GLOB SERPL: 0.9 {RATIO} (ref 1.1–2.2)
ALP SERPL-CCNC: 63 U/L (ref 45–117)
ALT SERPL-CCNC: 25 U/L (ref 12–78)
ANION GAP SERPL CALC-SCNC: 9 MMOL/L (ref 5–15)
AST SERPL-CCNC: 24 U/L (ref 15–37)
BILIRUB SERPL-MCNC: 0.5 MG/DL (ref 0.2–1)
BUN SERPL-MCNC: 20 MG/DL (ref 6–20)
BUN/CREAT SERPL: 19 (ref 12–20)
CALCIUM SERPL-MCNC: 9.9 MG/DL (ref 8.5–10.1)
CHLORIDE SERPL-SCNC: 106 MMOL/L (ref 97–108)
CHOLEST SERPL-MCNC: 237 MG/DL
CO2 SERPL-SCNC: 23 MMOL/L (ref 21–32)
CREAT SERPL-MCNC: 1.03 MG/DL (ref 0.55–1.02)
EST. AVERAGE GLUCOSE BLD GHB EST-MCNC: 143 MG/DL
GLOBULIN SER CALC-MCNC: 4.3 G/DL (ref 2–4)
GLUCOSE SERPL-MCNC: 105 MG/DL (ref 65–100)
HBA1C MFR BLD: 6.6 % (ref 4–5.6)
HDLC SERPL-MCNC: 62 MG/DL
HDLC SERPL: 3.8 {RATIO} (ref 0–5)
LDLC SERPL CALC-MCNC: 144.2 MG/DL (ref 0–100)
POTASSIUM SERPL-SCNC: 4.4 MMOL/L (ref 3.5–5.1)
PROT SERPL-MCNC: 8.2 G/DL (ref 6.4–8.2)
SODIUM SERPL-SCNC: 138 MMOL/L (ref 136–145)
TRIGL SERPL-MCNC: 154 MG/DL (ref ?–150)
VLDLC SERPL CALC-MCNC: 30.8 MG/DL

## 2022-08-01 PROCEDURE — G8754 DIAS BP LESS 90: HCPCS | Performed by: INTERNAL MEDICINE

## 2022-08-01 PROCEDURE — G9899 SCRN MAM PERF RSLTS DOC: HCPCS | Performed by: INTERNAL MEDICINE

## 2022-08-01 PROCEDURE — 1101F PT FALLS ASSESS-DOCD LE1/YR: CPT | Performed by: INTERNAL MEDICINE

## 2022-08-01 PROCEDURE — G8510 SCR DEP NEG, NO PLAN REQD: HCPCS | Performed by: INTERNAL MEDICINE

## 2022-08-01 PROCEDURE — 2022F DILAT RTA XM EVC RTNOPTHY: CPT | Performed by: INTERNAL MEDICINE

## 2022-08-01 PROCEDURE — 3044F HG A1C LEVEL LT 7.0%: CPT | Performed by: INTERNAL MEDICINE

## 2022-08-01 PROCEDURE — G8536 NO DOC ELDER MAL SCRN: HCPCS | Performed by: INTERNAL MEDICINE

## 2022-08-01 PROCEDURE — 1090F PRES/ABSN URINE INCON ASSESS: CPT | Performed by: INTERNAL MEDICINE

## 2022-08-01 PROCEDURE — 99214 OFFICE O/P EST MOD 30 MIN: CPT | Performed by: INTERNAL MEDICINE

## 2022-08-01 PROCEDURE — G8417 CALC BMI ABV UP PARAM F/U: HCPCS | Performed by: INTERNAL MEDICINE

## 2022-08-01 PROCEDURE — G8752 SYS BP LESS 140: HCPCS | Performed by: INTERNAL MEDICINE

## 2022-08-01 PROCEDURE — G8399 PT W/DXA RESULTS DOCUMENT: HCPCS | Performed by: INTERNAL MEDICINE

## 2022-08-01 PROCEDURE — G8427 DOCREV CUR MEDS BY ELIG CLIN: HCPCS | Performed by: INTERNAL MEDICINE

## 2022-08-01 PROCEDURE — 3017F COLORECTAL CA SCREEN DOC REV: CPT | Performed by: INTERNAL MEDICINE

## 2022-08-01 RX ORDER — PNEUMOCOCCAL 20-VALENT CONJUGATE VACCINE 2.2; 2.2; 2.2; 2.2; 2.2; 2.2; 2.2; 2.2; 2.2; 2.2; 2.2; 2.2; 2.2; 2.2; 2.2; 2.2; 4.4; 2.2; 2.2; 2.2 UG/.5ML; UG/.5ML; UG/.5ML; UG/.5ML; UG/.5ML; UG/.5ML; UG/.5ML; UG/.5ML; UG/.5ML; UG/.5ML; UG/.5ML; UG/.5ML; UG/.5ML; UG/.5ML; UG/.5ML; UG/.5ML; UG/.5ML; UG/.5ML; UG/.5ML; UG/.5ML
0.5 INJECTION, SUSPENSION INTRAMUSCULAR
Qty: 1 EACH | Refills: 0 | Status: SHIPPED | OUTPATIENT
Start: 2022-08-01

## 2022-08-01 NOTE — PROGRESS NOTES
Diagnoses and all orders for this visit:    1. Primary hypertension  Controlled  Daily Maxide 37.5/25 and verapamil 40 mg daily      2. Controlled type 2 diabetes mellitus without complication, without long-term current use of insulin ([facility] [address]  Discussion with patient that based on her labs she does have a diagnosis of diabetes. Discussed A1c greater than 6.4 and she is controlling by diet. Continue diet-controlled diabetes. She will take metformin if her A1c increases. Discussed with patient benefits of metformin as well as GLP-1's and SGLT2 for her heart and kidney. She would like to control do behaviorally with diet  -     METABOLIC PANEL, COMPREHENSIVE; Future  -     HEMOGLOBIN A1C WITH EAG; Future  -     LIPID PANEL; Future    3. Mixed hyperlipidemia  She is no longer on pravastatin she also prefers to take her cholesterol down by diet. 4. Stage 3a chronic kidney disease ([facility] [address]    Other orders  -     pneumococcal 20-aneesh conj-dip, PF, (Prevnar 20, PF,) 0.5 mL syrg injection; 0.5 mL by IntraMUSCular route PRIOR TO DISCHARGE for 1 dose. As directed  -     diphth,pertus,acell,,tetanus (BOOSTRIX TDAP) 2.5-8-5 Lf-mcg-Lf/0.5mL susp suspension; 0.5 mL by IntraMUSCular route once for 1 dose. Colonscopy 2023 last 2018 family hx      Chief Complaint   Patient presents with    Follow-up     Left shoulder  Improved      Diabetes  She is not taking metformin  She checks her bs 96 or 97. She checks before she eats breakfast  She does not like to take metfomrin, no sied effects but prefers not to be on medication  Perfs to be diet controlled  [place] will see next year  2 x week [employer], danna , exercising ingernal 4 times/week      Hyperlipidemia  She is not on cholesterol medication  Flax seed oil, chelesterol off  She reports the statin pravstatin hurts her muscles even the lowest dose, she took 3 times/week      Subjective:   Rosario Pimentel is a 74 y.o. female with hypertension.   Hypertension ROS: taking medications as instructed, no medication side effects noted, no TIA's, no chest pain on exertion, no dyspnea on exertion, no swelling of ankles. New concerns: None. Meningioma  Patient denies headaches. She will be seen by Dr. Carisa Romero for follow-up. Hyperlipidemia  She has been taking pravastatin 10 mg. No muscle ache or muscle pains. She has been eating a heart healthy diabetic type diet. Past Medical History:   Diagnosis Date    Arthritis     IN HIPS - resolved since replacement    Brain tumor ([address]     Pt being monitored by Dr Sharon Bui, meningioma    Environmental allergies     GERD (gastroesophageal reflux disease)     High cholesterol     Hypertension     Migraine     \"AFFECTS EYES\"; EPISODES START WITH EAR FULLNESS SENSATION    S/P chemotherapy, time since greater than 12 weeks     Uterine cancer ([facility] [address]      was seen by Dr. 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     [facility]    HX HEENT      shunt placed behind left ear to help with vertigo    HX HIP REPLACEMENT Right 2017    HX HYSTERECTOMY  1996    WITH BSO    HX ORTHOPAEDIC      right CHEPE     Social History     Socioeconomic History    Marital status:    Tobacco Use    Smoking status: Former     Packs/day: 0.50     Years: 20.00     Pack years: 10.00     Types: Cigarettes     Quit date: 1994     Years since quittin.6    Smokeless tobacco: Never    Tobacco comments:     not anymore   Substance and Sexual Activity    Alcohol use: No     Alcohol/week: 0.0 standard drinks    Drug use: No    Sexual activity: Not Currently     Comment: no   Social History Narrative    Retired at 69 yo but had to stop working due to vertigo    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        Grandson, 25, great nephew,21,  temporarily living with her        2 daughters both healthy     Family History   Problem Relation Age of Onset    Cancer Mother         colon    Stroke Mother     Heart Disease Mother     Diabetes Father     Cancer Father         kidney    Diabetes Sister     Elevated Lipids Sister     Heart Disease Sister     Hypertension Sister     Breast Cancer Sister 61    Heart Disease Brother     Breast Cancer Other     Breast Cancer Other     Anesth Problems Neg Hx      Current Outpatient Medications   Medication Sig Dispense Refill    triamterene-hydroCHLOROthiazide (MAXZIDE) 37.5-25 mg per tablet TAKE 1 TABLET BY MOUTH DAILY 90 Tablet 1    magnesium 250 mg tab Take  by mouth.      verapamiL (CALAN) 40 mg tablet ENT RX: TAKE 1 TABLET BY MOUTH ONCE DAILY 60 Tablet 0    naproxen sodium 220 mg cap Take  by mouth. folic acid/multivit-min/lutein (CENTRUM SILVER PO) Take  by mouth.      pantoprazole (PROTONIX) 40 mg tablet Take 1 Tab by mouth daily. 90 Tab 0    potassium 99 mg tablet Take 99 mg by mouth daily. pravastatin (PRAVACHOL) 20 mg tablet Take 1 Tablet by mouth nightly. Indications: high cholesterol and high triglycerides (Patient not taking: Reported on 8/1/2022) 90 Tablet 1    varicella-zoster recombinant, PF, (Shingrix, PF,) 50 mcg/0.5 mL susr injection 0.5mL by IntraMUSCular route once now and then repeat in 2-6 months (Patient not taking: Reported on 8/1/2022) 0.5 mL 1    glucose blood VI test strips (Accu-Chek Chante Plus test strp) strip Please check blood sugar daily (Patient not taking: Reported on 8/1/2022) 100 Strip 1    metFORMIN (GLUCOPHAGE) 500 mg tablet Take 0.5 Tabs by mouth daily (with breakfast).  (Patient not taking: Reported on 8/1/2022) 90 Tab 0    Blood-Glucose Meter monitoring kit Please check blood sugar when episodes of fatigue (Patient not taking: No sig reported) 1 Kit 0     Allergies Allergen Reactions    Buspar [Buspirone] Other (comments)     Altered mental status       Review of Systems - General ROS: negative for - chills or fever  Cardiovascular ROS: no chest pain or dyspnea on exertion  Respiratory ROS: no cough, shortness of breath, or wheezing    Visit Vitals  /82 (BP 1 Location: Left upper arm, BP Patient Position: Sitting, BP Cuff Size: Small adult)   Pulse 74   Temp 97.8 °F (36.6 °C) (Oral)   Resp 14   Ht 5' 4\" (1.626 m)   Wt 175 lb (79.4 kg)   SpO2 98%   BMI 30.04 kg/m²     Constitutional: [x] Appears well-developed and well-nourished [x] No apparent distress      [] Abnormal -     Mental status: [x] Alert and awake  [x] Oriented to person/place/time [x] Able to follow commands    [] Abnormal -     Eyes:   EOM    [x]  Normal    [] Abnormal -   Sclera  [x]  Normal    [] Abnormal -          Discharge [x]  None visible   [] Abnormal -     HENT: [x] Normocephalic, atraumatic  [] Abnormal -   [x] Mouth/Throat: Mucous membranes are moist    External Ears [x] Normal  [] Abnormal -    Neck: [x] No visualized mass [] Abnormal -     Pulmonary/Chest: [x] Respiratory effort normal   [x] No visualized signs of difficulty breathing or respiratory distress        [] Abnormal -      Musculoskeletal:   [x] Normal gait with no signs of ataxia         [x] Normal range of motion of neck        [] Abnormal -     Neurological:        [x] No Facial Asymmetry (Cranial nerve 7 motor function) (limited exam due to video visit)          [x] No gaze palsy        [] Abnormal -          Skin:        [x] No significant exanthematous lesions or discoloration noted on facial skin         [] Abnormal -            Psychiatric:       [x] Normal Affect [] Abnormal -        [x] No Hallucinations    This medical record was transcribed using an electronic medical records/speech recognition system. Although proofread, it may and can contain electronic, spelling and other errors.   Corrections may be executed at a later time. Please contact us for any clarifications as needed.

## 2022-09-06 DIAGNOSIS — I10 ESSENTIAL HYPERTENSION: ICD-10-CM

## 2022-09-07 RX ORDER — TRIAMTERENE/HYDROCHLOROTHIAZID 37.5-25 MG
TABLET ORAL
Qty: 90 TABLET | Refills: 1 | Status: SHIPPED | OUTPATIENT
Start: 2022-09-07

## 2022-10-03 ENCOUNTER — TRANSCRIBE ORDER (OUTPATIENT)
Dept: SCHEDULING | Age: 72
End: 2022-10-03

## 2022-10-03 DIAGNOSIS — Z12.31 SCREENING MAMMOGRAM FOR HIGH-RISK PATIENT: Primary | ICD-10-CM

## 2022-11-18 ENCOUNTER — HOSPITAL ENCOUNTER (OUTPATIENT)
Dept: MAMMOGRAPHY | Age: 72
Discharge: HOME | End: 2022-11-18
Attending: INTERNAL MEDICINE
Payer: MEDICARE

## 2022-11-18 DIAGNOSIS — Z12.31 SCREENING MAMMOGRAM FOR HIGH-RISK PATIENT: ICD-10-CM

## 2022-11-18 PROCEDURE — 77067 SCR MAMMO BI INCL CAD: CPT

## 2023-02-03 ENCOUNTER — APPOINTMENT (OUTPATIENT)
Dept: INTERNAL MEDICINE CLINIC | Age: 73
End: 2023-02-03

## 2023-03-06 ENCOUNTER — OFFICE VISIT (OUTPATIENT)
Dept: INTERNAL MEDICINE CLINIC | Age: 73
End: 2023-03-06
Payer: MEDICARE

## 2023-03-06 VITALS
HEIGHT: 64 IN | SYSTOLIC BLOOD PRESSURE: 122 MMHG | WEIGHT: 173.2 LBS | OXYGEN SATURATION: 98 % | RESPIRATION RATE: 14 BRPM | HEART RATE: 73 BPM | TEMPERATURE: 97.4 F | BODY MASS INDEX: 29.57 KG/M2 | DIASTOLIC BLOOD PRESSURE: 77 MMHG

## 2023-03-06 DIAGNOSIS — E11.9 TYPE 2 DIABETES MELLITUS WITHOUT COMPLICATION, WITHOUT LONG-TERM CURRENT USE OF INSULIN (HCC): ICD-10-CM

## 2023-03-06 DIAGNOSIS — Z12.11 SCREENING FOR COLON CANCER: ICD-10-CM

## 2023-03-06 DIAGNOSIS — I10 ESSENTIAL HYPERTENSION: ICD-10-CM

## 2023-03-06 DIAGNOSIS — R41.3 MEMORY PROBLEM: ICD-10-CM

## 2023-03-06 DIAGNOSIS — I10 PRIMARY HYPERTENSION: ICD-10-CM

## 2023-03-06 DIAGNOSIS — E78.2 MIXED HYPERLIPIDEMIA: ICD-10-CM

## 2023-03-06 DIAGNOSIS — K21.9 GASTROESOPHAGEAL REFLUX DISEASE WITHOUT ESOPHAGITIS: ICD-10-CM

## 2023-03-06 DIAGNOSIS — M79.651 PAIN IN BOTH THIGHS: ICD-10-CM

## 2023-03-06 DIAGNOSIS — M79.652 PAIN IN BOTH THIGHS: ICD-10-CM

## 2023-03-06 DIAGNOSIS — Z00.00 MEDICARE ANNUAL WELLNESS VISIT, SUBSEQUENT: Primary | ICD-10-CM

## 2023-03-06 PROCEDURE — 1101F PT FALLS ASSESS-DOCD LE1/YR: CPT | Performed by: INTERNAL MEDICINE

## 2023-03-06 PROCEDURE — G9899 SCRN MAM PERF RSLTS DOC: HCPCS | Performed by: INTERNAL MEDICINE

## 2023-03-06 PROCEDURE — G8536 NO DOC ELDER MAL SCRN: HCPCS | Performed by: INTERNAL MEDICINE

## 2023-03-06 PROCEDURE — G8399 PT W/DXA RESULTS DOCUMENT: HCPCS | Performed by: INTERNAL MEDICINE

## 2023-03-06 PROCEDURE — G8417 CALC BMI ABV UP PARAM F/U: HCPCS | Performed by: INTERNAL MEDICINE

## 2023-03-06 PROCEDURE — 99214 OFFICE O/P EST MOD 30 MIN: CPT | Performed by: INTERNAL MEDICINE

## 2023-03-06 PROCEDURE — G0439 PPPS, SUBSEQ VISIT: HCPCS | Performed by: INTERNAL MEDICINE

## 2023-03-06 PROCEDURE — G8427 DOCREV CUR MEDS BY ELIG CLIN: HCPCS | Performed by: INTERNAL MEDICINE

## 2023-03-06 PROCEDURE — 1090F PRES/ABSN URINE INCON ASSESS: CPT | Performed by: INTERNAL MEDICINE

## 2023-03-06 PROCEDURE — 3046F HEMOGLOBIN A1C LEVEL >9.0%: CPT | Performed by: INTERNAL MEDICINE

## 2023-03-06 PROCEDURE — G8510 SCR DEP NEG, NO PLAN REQD: HCPCS | Performed by: INTERNAL MEDICINE

## 2023-03-06 PROCEDURE — 3017F COLORECTAL CA SCREEN DOC REV: CPT | Performed by: INTERNAL MEDICINE

## 2023-03-06 PROCEDURE — 2022F DILAT RTA XM EVC RTNOPTHY: CPT | Performed by: INTERNAL MEDICINE

## 2023-03-06 RX ORDER — TRIAMTERENE/HYDROCHLOROTHIAZID 37.5-25 MG
TABLET ORAL
Qty: 90 TABLET | Refills: 1 | Status: SHIPPED | OUTPATIENT
Start: 2023-03-06

## 2023-03-06 RX ORDER — ATORVASTATIN CALCIUM 10 MG/1
TABLET, FILM COATED ORAL DAILY
COMMUNITY

## 2023-03-06 NOTE — PATIENT INSTRUCTIONS
Medicare Wellness Visit, Female     The best way to live healthy is to have a lifestyle where you eat a well-balanced diet, exercise regularly, limit alcohol use, and quit all forms of tobacco/nicotine, if applicable. Regular preventive services are another way to keep healthy. Preventive services (vaccines, screening tests, monitoring & exams) can help personalize your care plan, which helps you manage your own care. Screening tests can find health problems at the earliest stages, when they are easiest to treat. [facility] follows the current, evidence-based guidelines published by the [facility] ([facility]) when recommending preventive services for our patients. Because we follow these guidelines, sometimes recommendations change over time as research supports it. (For example, mammograms used to be recommended annually. Even though Medicare will still pay for an annual mammogram, the newer guidelines recommend a mammogram every two years for women of average risk). Of course, you and your doctor may decide to screen more often for some diseases, based on your risk and your co-morbidities (chronic disease you are already diagnosed with). Preventive services for you include:  - Medicare offers their members a free annual wellness visit, which is time for you and your primary care provider to discuss and plan for your preventive service needs.  Take advantage of this benefit every year!    -Over the age of 72 should receive the recommended pneumonia vaccines.    -All adults should have a flu vaccine yearly.  -All adults should have a tetanus vaccine every 10 years.   -Over the age 48 should receive the shingles vaccines.        -All adults should be screened once for Hepatitis C.  -All adults age 38-68 who are overweight should have a diabetes screening test once every three years.   -Other screening tests and preventive services for persons with diabetes include: an eye exam to screen for diabetic retinopathy, a kidney function test, a foot exam, and stricter control over your cholesterol.   -Cardiovascular screening for adults with routine risk involves an electrocardiogram (ECG) at intervals determined by your doctor.     -Colorectal cancer screenings should be done for adults age 39-70 with no increased risk factors for colorectal cancer. There are a number of acceptable methods of screening for this type of cancer. Each test has its own benefits and drawbacks. Discuss with your doctor what is most appropriate for you during your annual wellness visit. The different tests include: colonoscopy (considered the best screening method), a fecal occult blood test, a fecal DNA test, and sigmoidoscopy.    -Lung cancer screening is recommended annually with a low dose CT scan for adults between age 54 and 68, who have smoked at least 30 pack years (equivalent of 1 pack per day for 30 days), and who is a current smoker or quit less than 15 years ago.    -A bone mass density test is recommended when a woman turns 65 to screen for osteoporosis. This test is only recommended one time, as a screening. Some providers will use this same test as a disease monitoring tool if you already have osteoporosis. -Breast cancer screenings are recommended every other year for women of normal risk, age 54-69.    -Cervical cancer screenings for women over age 72 are only recommended with certain risk factors.      Here is a list of your current Health Maintenance items (your personalized list of preventive services) with a due date:  Health Maintenance Due   Topic Date Due    Shingles Vaccine (1 of 2) Never done    DTaP/Tdap/Td  (2 - Td or Tdap) 07/01/2020    Diabetic Foot Care  09/14/2021    Colorectal Screening  11/27/2021    Yearly Flu Vaccine (1) 08/01/2022    COVID-19 Vaccine (5 - Booster for Pfizer series) 09/18/2022    URINE CHECK FOR KIDNEY PROBLEMS  10/04/2022              Low Back Pain: Exercises  Introduction  Here are some examples of exercises for you to try. The exercises may be suggested for a condition or for rehabilitation. Start each exercise slowly. Ease off the exercises if you start to have pain. You will be told when to start these exercises and which ones will work best for you. How to do the exercises  Press-up  Lie on your stomach, supporting your body with your forearms. Press your elbows down into the floor to raise your upper back. As you do this, relax your stomach muscles and allow your back to arch without using your back muscles. As your press up, do not let your hips or pelvis come off the floor. Hold for 15 to 30 seconds, then relax. Repeat 2 to 4 times. Alternate arm and leg (bird dog) exercise  Do this exercise slowly. Try to keep your body straight at all times, and do not let one hip drop lower than the other. Start on the floor, on your hands and knees. Tighten your belly muscles. Raise one leg off the floor, and hold it straight out behind you. Be careful not to let your hip drop down, because that will twist your trunk. Hold for about 6 seconds, then lower your leg and switch to the other leg. Repeat 8 to 12 times on each leg. Over time, work up to holding for 10 to 30 seconds each time. If you feel stable and secure with your leg raised, try raising the opposite arm straight out in front of you at the same time. Knee-to-chest exercise  Lie on your back with your knees bent and your feet flat on the floor. Bring one knee to your chest, keeping the other foot flat on the floor (or keeping the other leg straight, whichever feels better on your lower back). Keep your lower back pressed to the floor. Hold for at least 15 to 30 seconds. Relax, and lower the knee to the starting position. Repeat with the other leg. Repeat 2 to 4 times with each leg.   To get more stretch, put your other leg flat on the floor while pulling your knee to your chest.  Curl-ups  Lie on the floor on your back with your knees bent at a 90-degree angle. Your feet should be flat on the floor, about 12 inches from your buttocks. Cross your arms over your chest. If this bothers your neck, try putting your hands behind your neck (not your head), with your elbows spread apart. Slowly tighten your belly muscles and raise your shoulder blades off the floor. Keep your head in line with your body, and do not press your chin to your chest.  Hold this position for 1 or 2 seconds, then slowly lower yourself back down to the floor. Repeat 8 to 12 times. Pelvic tilt exercise  Lie on your back with your knees bent. \"Brace\" your stomach. This means to tighten your muscles by pulling in and imagining your belly button moving toward your spine. You should feel like your back is pressing to the floor and your hips and pelvis are rocking back. Hold for about 6 seconds while you breathe smoothly. Repeat 8 to 12 times. Heel dig bridging  Lie on your back with both knees bent and your ankles bent so that only your heels are digging into the floor. Your knees should be bent about 90 degrees. Then push your heels into the floor, squeeze your buttocks, and lift your hips off the floor until your shoulders, hips, and knees are all in a straight line. Hold for about 6 seconds as you continue to breathe normally, and then slowly lower your hips back down to the floor and rest for up to 10 seconds. Do 8 to 12 repetitions. Hamstring stretch in doorway  Lie on your back in a doorway, with one leg through the open door. Slide your leg up the wall to straighten your knee. You should feel a gentle stretch down the back of your leg. Hold the stretch for at least 15 to 30 seconds. Do not arch your back, point your toes, or bend either knee. Keep one heel touching the floor and the other heel touching the wall. Repeat with your other leg. Do 2 to 4 times for each leg.   Hip flexor stretch  Kneel on the floor with one knee bent and one leg behind you. Place your forward knee over your foot. Keep your other knee touching the floor. Slowly push your hips forward until you feel a stretch in the upper thigh of your rear leg. Hold the stretch for at least 15 to 30 seconds. Repeat with your other leg. Do 2 to 4 times on each side. Wall sit  Stand with your back 10 to 12 inches away from a wall. Lean into the wall until your back is flat against it. Slowly slide down until your knees are slightly bent, pressing your lower back into the wall. Hold for about 6 seconds, then slide back up the wall. Repeat 8 to 12 times. Follow-up care is a key part of your treatment and safety. Be sure to make and go to all appointments, and call your doctor if you are having problems. It's also a good idea to know your test results and keep a list of the medicines you take. Current as of: March 9, 2022               Content Version: 13.4  © 2006-2022 [employer]. Care instructions adapted under license by [employer] (which disclaims liability or warranty for this information). If you have questions about a medical condition or this instruction, always ask your healthcare professional. [place] [address] any warranty or liability for your use of this information.

## 2023-03-06 NOTE — PROGRESS NOTES
Assessment and Plan     1. Medicare annual wellness visit, subsequent  2. Essential hypertension  -     triamterene-hydroCHLOROthiazide (MAXZIDE) 37.5-25 mg per tablet; TAKE 1 TABLET BY MOUTH DAILY, Normal, Disp-90 Tablet, R-1  3. Screening for colon cancer  -     REFERRAL TO GASTROENTEROLOGY  4. Type 2 diabetes mellitus without complication, without long-term current use of insulin ([address]  5. Mixed hyperlipidemia  6. Gastroesophageal reflux disease without esophagitis  7. Primary hypertension  8. Memory problem  9. Pain in both thighs   HM  Colonoscopy referral provided   Rec getting TDAP, shingles    Pain posterior thigh pain   Dull pain that starts in morning, lower back  Ongoing for the past 1.5mo   Previously prescribed baclofen, naproxen (doesn't help), diclofenac for other issues so she tried it- baclofen tristen helped but she's concerned about SE  Affecting how she's walking   Tylenol helps   Rec trial off atorvastatin. Brought up PT, but pt reports financial constraints. She has exercises for her hips from PT, I also gave her exercises for her lower back. Pain likely muscular in the setting of underlying arthritis. Hld  Atrova 10 - doesn't take consistently due to muscle aches - maybe takes twice a week   Trial off atorva to see if thigh pain improves    GERD  Pantoprazole 40, cont    BP  Triamterene-hctz 37.5/25  Potassium   At goal, cont    Vertigo  Verapamil 40mg daily   At goal, cont    Dm2  Well controlled on last labs, cont diet control    Memory   For [facility] questions, reported some concerns regarding her memory  Minicog 4/5  Offered full MOCA testing. She will think about it     Benefits, risks, possible drug interactions, and side effects of all new medications were reviewed with the patient. Pt verbalized understanding. Return to clinic:  6 weeks for thigh pain, HLD    An electronic signature was used to authenticate this note.   Ashly León MD  [facility]  3/6/2023    Future Appointments   Date Time Provider Kay Pruitt   4/17/2023 10:00 AM Margarito Evangelista MD [facility] BS AMB        History of Present Illness   Chief Complaint   Joseph Suarez is a 74 y.o. female         Review of Systems   Constitutional:  Negative for chills and fever. HENT:  Negative for hearing loss. Eyes:  Negative for blurred vision. Respiratory:  Negative for shortness of breath. Cardiovascular:  Negative for chest pain. Gastrointestinal:  Negative for abdominal pain, blood in stool, constipation, diarrhea, melena, nausea and vomiting. Genitourinary:  Negative for dysuria and hematuria. Musculoskeletal:  Positive for back pain. Negative for joint pain. Skin:  Negative for rash. Neurological:  Negative for headaches. Past Medical History     Allergies   Allergen Reactions    Buspar [Buspirone] Other (comments)     Altered mental status        Current Outpatient Medications   Medication Sig    atorvastatin (LIPITOR) 10 mg tablet Take  by mouth daily. triamterene-hydroCHLOROthiazide (MAXZIDE) 37.5-25 mg per tablet TAKE 1 TABLET BY MOUTH DAILY    magnesium 250 mg tab Take  by mouth.    verapamiL (CALAN) 40 mg tablet ENT RX: TAKE 1 TABLET BY MOUTH ONCE DAILY    glucose blood VI test strips (Accu-Chek Chante Plus test strp) strip Please check blood sugar daily    Blood-Glucose Meter monitoring kit Please check blood sugar when episodes of fatigue    folic acid/multivit-min/lutein (CENTRUM SILVER PO) Take  by mouth.    pantoprazole (PROTONIX) 40 mg tablet Take 1 Tab by mouth daily. potassium 99 mg tablet Take 99 mg by mouth daily. pneumococcal 20-aneesh conj-dip, PF, (Prevnar 20, PF,) 0.5 mL syrg injection 0.5 mL by IntraMUSCular route PRIOR TO DISCHARGE for 1 dose.  As directed (Patient not taking: Reported on 3/6/2023)    varicella-zoster recombinant, PF, (Shingrix, PF,) 50 mcg/0.5 mL susr injection 0.5mL by IntraMUSCular route once now and then repeat in 2-6 months (Patient not taking: No sig reported)     No current facility-administered medications for this visit. Patient Active Problem List   Diagnosis Code    Hyperlipemia E78.5    S/P WILFRID-BSO Z90.710, Z90.722, Z90.79    Gastric ulcer, unspecified as acute or chronic, without mention of hemorrhage, perforation, or obstruction K25.9    Vertigo or labyrinthine disorder [id number]    Pap smear for cervical cancer screening Z12.4    Hx of screening mammography Z92.89    Screening for colon cancer Z12.11    Hypertension I10    Migraine G43.909    GERD (gastroesophageal reflux disease) K21.9    Advanced care planning/counseling discussion Z71.89    Chronic renal disease, stage III N18.30     Past Surgical History:   Procedure Laterality Date    COLONOSCOPY N/A 2018    COLONOSCOPY performed by Rene Phelps MD at [facility] ENDOSCOPY    ENDOSCOPY, COLON, DIAGNOSTIC  3/12/2010    ([place])-f/u 5 yrs.     [address] [facility]    HX HEENT  2012    shunt placed behind left ear to help with vertigo    HX HIP REPLACEMENT Right 2017    HX HYSTERECTOMY  1996    WITH BSO    HX ORTHOPAEDIC      right CHEPE      Social History     Tobacco Use    Smoking status: Former     Packs/day: 0.50     Years: 20.00     Pack years: 10.00     Types: Cigarettes     Quit date: 1994     Years since quittin.1    Smokeless tobacco: Never    Tobacco comments:     not anymore   Substance Use Topics    Alcohol use: No     Alcohol/week: 0.0 standard drinks      Family History   Problem Relation Age of Onset    Cancer Mother         colon    Stroke Mother     Heart Disease Mother     Diabetes Father     Cancer Father         kidney    Diabetes Sister     Elevated Lipids Sister     Heart Disease Sister     Hypertension Sister     Breast Cancer Sister 61    Heart Disease Brother     Breast Cancer Other     Breast Cancer Other     Anesth Problems Neg Hx         Physical Exam   Vitals:       Visit Vitals  /77 (BP 1 Location: Left upper arm, BP Patient Position: Sitting, BP Cuff Size: Small adult)   Pulse 73   Temp 97.4 °F (36.3 °C) (Oral)   Resp 14   Ht 5' 4\" (1.626 m)   Wt 173 lb 3.2 oz (78.6 kg)   SpO2 98%   BMI 29.73 kg/m²        Physical Exam  Constitutional:       General: She is not in acute distress. Appearance: She is well-developed. HENT:      Right Ear: Tympanic membrane, ear canal and external ear normal.      Left Ear: Tympanic membrane, ear canal and external ear normal.      Mouth/Throat:      Mouth: Mucous membranes are moist.      Pharynx: No posterior oropharyngeal erythema. Eyes:      Extraocular Movements: Extraocular movements intact. Conjunctiva/sclera: Conjunctivae normal.   Cardiovascular:      Rate and Rhythm: Normal rate and regular rhythm. Pulses: Normal pulses. Heart sounds: No murmur heard. No friction rub. No gallop. Pulmonary:      Effort: No respiratory distress. Breath sounds: No wheezing, rhonchi or rales. Abdominal:      General: Bowel sounds are normal. There is no distension. Palpations: Abdomen is soft. There is no hepatomegaly, splenomegaly or mass. Tenderness: There is no abdominal tenderness. There is no guarding. Musculoskeletal:      Cervical back: Neck supple. Comments: Trace LE edema around ankles   Lymphadenopathy:      Cervical: No cervical adenopathy. Skin:     General: Skin is warm. Findings: No rash. Neurological:      Mental Status: She is alert. This is the Subsequent Medicare Annual Wellness Exam, performed 12 months or more after the Initial AWV or the last Subsequent AWV    I have reviewed the patient's medical history in detail and updated the computerized patient record. Assessment/Plan   Education and counseling provided:  Are appropriate based on today's review and evaluation    1. Medicare annual wellness visit, subsequent  2.  Essential hypertension  -     triamterene-hydroCHLOROthiazide (MAXZIDE) 37.5-25 mg per tablet; TAKE 1 TABLET BY MOUTH DAILY, Normal, Disp-90 Tablet, R-1  3. Screening for colon cancer  -     REFERRAL TO GASTROENTEROLOGY  4. Type 2 diabetes mellitus without complication, without long-term current use of insulin ([facility] [address]  5. Mixed hyperlipidemia  6. Gastroesophageal reflux disease without esophagitis  7. Primary hypertension  8. Memory problem  9. Pain in both thighs     Depression Risk Factor Screening     3 most recent PHQ Screens 3/6/2023   Little interest or pleasure in doing things Not at all   Feeling down, depressed, irritable, or hopeless Not at all   Total Score PHQ 2 0       Alcohol & Drug Abuse Risk Screen    Do you average more than 1 drink per night or more than 7 drinks a week:  No    On any one occasion in the past three months have you have had more than 3 drinks containing alcohol:  No          Functional Ability and Level of Safety    Hearing:  decreased , had hearing test years ago, had hearing aides but expensive       Activities of Daily Living: The home contains: handrails and grab bars  Patient does total self care      Ambulation: with mild difficulty     Fall Risk:  Fall Risk Assessment, last 12 mths 3/6/2023   Able to walk? Yes   Fall in past 12 months? 0   Do you feel unsteady? 0   Are you worried about falling 0   Number of falls in past 12 months -   Fall with injury?  -      Abuse Screen:  Patient is not abused       Cognitive Screening    Has your family/caregiver stated any concerns about your memory: no sometimes  she has a concern - had dishcloth in the [address] Maintenance Due     Health Maintenance Due   Topic Date Due    Shingles Vaccine (1 of 2) Never done    DTaP/Tdap/Td series (2 - Td or Tdap) 07/01/2020    Foot Exam Q1  09/14/2021    Colorectal Cancer Screening Combo  11/27/2021    COVID-19 Vaccine (5 - Booster for Pfizer series) 09/18/2022    Diabetic Alb to Cr ratio (uACR) test  10/04/2022       Patient Care Team   Patient Care Team:  Ehsan Garcia MD as PCP - General (Internal Medicine Physician)  Ehsan Garcia MD as PCP - [facility] Provider  Abner Schwab MD (Gastroenterology)    History     Patient Active Problem List   Diagnosis Code    Hyperlipemia E78.5    S/P WILFRID-BSO Z90.710, Z90.722, Z90.79    Gastric ulcer, unspecified as acute or chronic, without mention of hemorrhage, perforation, or obstruction K25.9    Vertigo or labyrinthine disorder [id number]    Pap smear for cervical cancer screening Z12.4    Hx of screening mammography Z92.89    Screening for colon cancer Z12.11    Hypertension I10    Migraine G43.909    GERD (gastroesophageal reflux disease) K21.9    Advanced care planning/counseling discussion Z71.89    Chronic renal disease, stage III N18.30     Past Medical History:   Diagnosis Date    Arthritis     IN HIPS - resolved since replacement    Brain tumor ([address]     Pt being monitored by Dr Erma Tamez, meningioma    Environmental allergies     GERD (gastroesophageal reflux disease)     High cholesterol     Hypertension     Migraine     \"AFFECTS EYES\"; EPISODES START WITH EAR FULLNESS SENSATION    S/P chemotherapy, time since greater than 12 weeks 1996    Uterine cancer ([address] 1996     was seen by Dr. Traci Strange    Vertigo or labyrinthine disorder 10/31/2011    ENT shunt placed on left side behind ear      Past Surgical History:   Procedure Laterality Date    COLONOSCOPY N/A 11/27/2018    COLONOSCOPY performed by Abner Schwab MD at [facility]    ENDOSCOPY, COLON, DIAGNOSTIC  3/12/2010    ([place])-f/u 5 yrs. [facility]    HX HEENT  2012    shunt placed behind left ear to help with vertigo    HX HIP REPLACEMENT Right 2017    HX HYSTERECTOMY  1996    WITH BSO    HX ORTHOPAEDIC      right CHEPE     Current Outpatient Medications   Medication Sig Dispense Refill    atorvastatin (LIPITOR) 10 mg tablet Take  by mouth daily.       triamterene-hydroCHLOROthiazide (MAXZIDE) 37.5-25 mg per tablet TAKE 1 TABLET BY MOUTH DAILY 90 Tablet 1    magnesium 250 mg tab Take  by mouth.      verapamiL (CALAN) 40 mg tablet ENT RX: TAKE 1 TABLET BY MOUTH ONCE DAILY 60 Tablet 0    glucose blood VI test strips (Accu-Chek Chante Plus test strp) strip Please check blood sugar daily 100 Strip 1    Blood-Glucose Meter monitoring kit Please check blood sugar when episodes of fatigue 1 Kit 0    folic acid/multivit-min/lutein (CENTRUM SILVER PO) Take  by mouth.      pantoprazole (PROTONIX) 40 mg tablet Take 1 Tab by mouth daily. 90 Tab 0    potassium 99 mg tablet Take 99 mg by mouth daily. pneumococcal 20-aneesh conj-dip, PF, (Prevnar 20, PF,) 0.5 mL syrg injection 0.5 mL by IntraMUSCular route PRIOR TO DISCHARGE for 1 dose.  As directed (Patient not taking: Reported on 3/6/2023) 1 Each 0    varicella-zoster recombinant, PF, (Shingrix, PF,) 50 mcg/0.5 mL susr injection 0.5mL by IntraMUSCular route once now and then repeat in 2-6 months (Patient not taking: No sig reported) 0.5 mL 1     Allergies   Allergen Reactions    Buspar [Buspirone] Other (comments)     Altered mental status       Family History   Problem Relation Age of Onset    Cancer Mother         colon    Stroke Mother     Heart Disease Mother     Diabetes Father     Cancer Father         kidney    Diabetes Sister     Elevated Lipids Sister     Heart Disease Sister     Hypertension Sister     Breast Cancer Sister 61    Heart Disease Brother     Breast Cancer Other     Breast Cancer Other     Anesth Problems Neg Hx      Social History     Tobacco Use    Smoking status: Former     Packs/day: 0.50     Years: 20.00     Pack years: 10.00     Types: Cigarettes     Quit date: 1994     Years since quittin.1    Smokeless tobacco: Never    Tobacco comments:     not anymore   Substance Use Topics    Alcohol use: No     Alcohol/week: 0.0 standard drinks         Hawk Tomlin MD

## 2023-04-17 ENCOUNTER — OFFICE VISIT (OUTPATIENT)
Dept: INTERNAL MEDICINE CLINIC | Age: 73
End: 2023-04-17
Payer: MEDICARE

## 2023-04-17 VITALS
OXYGEN SATURATION: 98 % | WEIGHT: 172.2 LBS | HEIGHT: 64 IN | TEMPERATURE: 97.3 F | BODY MASS INDEX: 29.4 KG/M2 | DIASTOLIC BLOOD PRESSURE: 82 MMHG | HEART RATE: 75 BPM | SYSTOLIC BLOOD PRESSURE: 122 MMHG | RESPIRATION RATE: 16 BRPM

## 2023-04-17 DIAGNOSIS — E78.2 MIXED HYPERLIPIDEMIA: ICD-10-CM

## 2023-04-17 DIAGNOSIS — D32.9 MENINGIOMA (HCC): ICD-10-CM

## 2023-04-17 DIAGNOSIS — I10 ESSENTIAL HYPERTENSION: ICD-10-CM

## 2023-04-17 DIAGNOSIS — E11.9 TYPE 2 DIABETES MELLITUS WITHOUT COMPLICATION, WITHOUT LONG-TERM CURRENT USE OF INSULIN (HCC): Primary | ICD-10-CM

## 2023-04-17 PROCEDURE — 1101F PT FALLS ASSESS-DOCD LE1/YR: CPT | Performed by: INTERNAL MEDICINE

## 2023-04-17 PROCEDURE — 3017F COLORECTAL CA SCREEN DOC REV: CPT | Performed by: INTERNAL MEDICINE

## 2023-04-17 PROCEDURE — 1090F PRES/ABSN URINE INCON ASSESS: CPT | Performed by: INTERNAL MEDICINE

## 2023-04-17 PROCEDURE — G8417 CALC BMI ABV UP PARAM F/U: HCPCS | Performed by: INTERNAL MEDICINE

## 2023-04-17 PROCEDURE — 2022F DILAT RTA XM EVC RTNOPTHY: CPT | Performed by: INTERNAL MEDICINE

## 2023-04-17 PROCEDURE — G8510 SCR DEP NEG, NO PLAN REQD: HCPCS | Performed by: INTERNAL MEDICINE

## 2023-04-17 PROCEDURE — G8536 NO DOC ELDER MAL SCRN: HCPCS | Performed by: INTERNAL MEDICINE

## 2023-04-17 PROCEDURE — 3046F HEMOGLOBIN A1C LEVEL >9.0%: CPT | Performed by: INTERNAL MEDICINE

## 2023-04-17 PROCEDURE — G8399 PT W/DXA RESULTS DOCUMENT: HCPCS | Performed by: INTERNAL MEDICINE

## 2023-04-17 PROCEDURE — G9899 SCRN MAM PERF RSLTS DOC: HCPCS | Performed by: INTERNAL MEDICINE

## 2023-04-17 PROCEDURE — 99214 OFFICE O/P EST MOD 30 MIN: CPT | Performed by: INTERNAL MEDICINE

## 2023-04-17 PROCEDURE — G8427 DOCREV CUR MEDS BY ELIG CLIN: HCPCS | Performed by: INTERNAL MEDICINE

## 2023-04-17 NOTE — PROGRESS NOTES
Diagnoses and all orders for this visit:    1. Type 2 diabetes mellitus without complication, without long-term current use of insulin (HCC)  Unable to tolerate metformin due to generalized weakness with prior attempts  Family history of diabetes with complications-patient would like to ideally see her A1c and continue diet control  -     HEMOGLOBIN A1C WITH EAG; Future    Discussed with patient that she may need medication. She would like to talk to pharmacy/[facility] to further discuss options  Would benefit from a GLP-1 for cardiac and renal protection    2. Meningioma (HCC)  Stable no changes    4. Essential hypertension  Controlled  Continue triamterene-hydrochlorothiazide 37.5/25 daily  Continue verapamil 40 mg daily. Note initially prescribed for vertigo symptoms however helping with blood pressure as well    5. Mixed hyperlipidemia  Goal LDL less than 70    Not on atorvastatin due to generalized leg weakness. She was taking atorvastatin 3 times a week and was seen by another provider who recommended to discontinue atorvastatin. She notes that since stopping atorvastatin her leg pain is better    -     LIPID PANEL; Future  -     METABOLIC PANEL, COMPREHENSIVE; Future  -     CK; Future    Sciatica  Persistent pain for 2 months.   Offered LUISITO defers and will see her orthopedist who is already working her up for      Chief Complaint   Patient presents with    Hypertension    Medication Evaluation     Hyperlipdimia  Taking Atorvastatin taking three times a week b/c every day gives her muscle pain  NOT on atrovasatabin currently due to muscle pains that she was experiencing in her legs  Dr. Miriam Perez told her to hold for now    Leg pain sciatica  6 months ago she was Lifting battery from car  Taking tylenol and diclofenac with which improved symptoms but they recurred and worse over the past 2 months she has been followed by orthopedics now and getting treatment    Diet-controlled diabetes  Reports family history of diabetes complications with amputation. She prefers to stay controlled on diet. We discussed metformin and previously had issues with symptoms of weakness associated with this medication. She denies chest pain or shortness of breath. Subjective:   Jesse Silverio is a 74 y.o. female with hypertension. Hypertension ROS: taking medications as instructed, no medication side effects noted, no TIA's, no chest pain on exertion, no dyspnea on exertion, no swelling of ankles. New concerns: None. Past Medical History:   Diagnosis Date    Arthritis     IN HIPS - resolved since replacement    Brain tumor ([facility] [address]     Pt being monitored by Dr Gopal Martinez, meningioma    Environmental allergies     GERD (gastroesophageal reflux disease)     High cholesterol     Hypertension     Migraine     \"AFFECTS EYES\"; EPISODES START WITH EAR FULLNESS SENSATION    S/P chemotherapy, time since greater than 12 weeks     Uterine cancer ([facility] [address]      was seen by Dr. Jesse Marshall    Vertigo or labyrinthine disorder 10/31/2011    ENT shunt placed on left side behind ear     Past Surgical History:   Procedure Laterality Date    COLONOSCOPY N/A 2018    COLONOSCOPY performed by Thomas Fernando MD at [facility] ENDOSCOPY    ENDOSCOPY, COLON, DIAGNOSTIC  3/12/2010    ([place])-f/u 5 yrs. [facility]    HX HEENT      shunt placed behind left ear to help with vertigo    HX HIP REPLACEMENT Right 2017    HX HYSTERECTOMY  1996    WITH BSO    HX ORTHOPAEDIC      right CHEPE     Social History     Socioeconomic History    Marital status:    Tobacco Use    Smoking status: Former     Packs/day: 0.50     Years: 20.00     Pack years: 10.00     Types: Cigarettes     Quit date: 1994     Years since quittin.3    Smokeless tobacco: Never    Tobacco comments:     not anymore   Vaping Use    Vaping Use: Never used   Substance and Sexual Activity    Alcohol use: No     Alcohol/week: 0.0 standard drinks    Drug use:  No Sexual activity: Not Currently     Comment: no   Social History Narrative    Retired at 71 yo but had to stop working due to vertigo    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        Grandson, 25, great nephew,21,  temporarily living with her        2 daughters both healthy     Family History   Problem Relation Age of Onset    Cancer Mother         colon    Stroke Mother     Heart Disease Mother     Diabetes Father     Cancer Father         kidney    Diabetes Sister     Elevated Lipids Sister     Heart Disease Sister     Hypertension Sister     Breast Cancer Sister 61    Heart Disease Brother     Breast Cancer Other     Breast Cancer Other     Anesth Problems Neg Hx      Current Outpatient Medications   Medication Sig Dispense Refill    atorvastatin (LIPITOR) 10 mg tablet Take  by mouth as needed. triamterene-hydroCHLOROthiazide (MAXZIDE) 37.5-25 mg per tablet TAKE 1 TABLET BY MOUTH DAILY 90 Tablet 1    magnesium 250 mg tab Take  by mouth.      verapamiL (CALAN) 40 mg tablet ENT RX: TAKE 1 TABLET BY MOUTH ONCE DAILY 60 Tablet 0    glucose blood VI test strips (Accu-Chek Chante Plus test strp) strip Please check blood sugar daily 100 Strip 1    Blood-Glucose Meter monitoring kit Please check blood sugar when episodes of fatigue 1 Kit 0    folic acid/multivit-min/lutein (CENTRUM SILVER PO) Take  by mouth.      pantoprazole (PROTONIX) 40 mg tablet Take 1 Tab by mouth daily. 90 Tab 0    potassium 99 mg tablet Take 1 Tablet by mouth daily. pneumococcal 20-aneesh conj-dip, PF, (Prevnar 20, PF,) 0.5 mL syrg injection 0.5 mL by IntraMUSCular route PRIOR TO DISCHARGE for 1 dose.  As directed (Patient not taking: Reported on 3/6/2023) 1 Each 0    varicella-zoster recombinant, PF, (Shingrix, PF,) 50 mcg/0.5 mL susr injection 0.5mL by IntraMUSCular route once now and then repeat in 2-6 months (Patient not taking: Reported on 8/1/2022) 0.5 mL 1     Allergies   Allergen Reactions    Buspar [Buspirone] Other (comments)     Altered mental status       Review of Systems - General ROS: negative for - chills or fever  Cardiovascular ROS: no chest pain or dyspnea on exertion  Respiratory ROS: no cough, shortness of breath, or wheezing    Visit Vitals  /82 (BP 1 Location: Left upper arm, BP Patient Position: Sitting, BP Cuff Size: Adult)   Pulse 75   Temp 97.3 °F (36.3 °C) (Oral)   Resp 16   Ht 5' 4\" (1.626 m)   Wt 172 lb 3.2 oz (78.1 kg)   SpO2 98%   BMI 29.56 kg/m²     Constitutional: [x] Appears well-developed and well-nourished [x] No apparent distress      [] Abnormal -     Mental status: [x] Alert and awake  [x] Oriented to person/place/time [x] Able to follow commands    [] Abnormal -     Eyes:   EOM    [x]  Normal    [] Abnormal -   Sclera  [x]  Normal    [] Abnormal -          Discharge [x]  None visible   [] Abnormal -     HENT: [x] Normocephalic, atraumatic  [] Abnormal -   [x] Mouth/Throat: Mucous membranes are moist    External Ears [x] Normal  [] Abnormal -    Neck: [x] No visualized mass [] Abnormal -     Pulmonary/Chest: [x] Respiratory effort normal   [x] No visualized signs of difficulty breathing or respiratory distress        [] Abnormal -      Musculoskeletal:   [x] Normal gait with no signs of ataxia         [x] Normal range of motion of neck        [] Abnormal -     Neurological:        [x] No Facial Asymmetry (Cranial nerve 7 motor function) (limited exam due to video visit)          [x] No gaze palsy        [] Abnormal -          Skin:        [x] No significant exanthematous lesions or discoloration noted on facial skin         [] Abnormal -            Psychiatric:       [x] Normal Affect [] Abnormal -        [x] No Hallucinations      This medical record was transcribed using an electronic medical records/speech recognition system. Although proofread, it may and can contain electronic, spelling and other errors. Corrections may be executed at a later time.   Please contact us for any clarifications as needed.

## 2023-04-18 LAB
ALBUMIN SERPL-MCNC: 3.9 G/DL (ref 3.5–5)
ALBUMIN/GLOB SERPL: 0.9 (ref 1.1–2.2)
ALP SERPL-CCNC: 64 U/L (ref 45–117)
ALT SERPL-CCNC: 32 U/L (ref 12–78)
ANION GAP SERPL CALC-SCNC: 6 MMOL/L (ref 5–15)
AST SERPL-CCNC: 22 U/L (ref 15–37)
BILIRUB SERPL-MCNC: 0.5 MG/DL (ref 0.2–1)
BUN SERPL-MCNC: 22 MG/DL (ref 6–20)
BUN/CREAT SERPL: 20 (ref 12–20)
CALCIUM SERPL-MCNC: 9.7 MG/DL (ref 8.5–10.1)
CHLORIDE SERPL-SCNC: 107 MMOL/L (ref 97–108)
CHOLEST SERPL-MCNC: 229 MG/DL
CK SERPL-CCNC: 238 U/L (ref 26–192)
CO2 SERPL-SCNC: 25 MMOL/L (ref 21–32)
CREAT SERPL-MCNC: 1.12 MG/DL (ref 0.55–1.02)
EST. AVERAGE GLUCOSE BLD GHB EST-MCNC: 140 MG/DL
GLOBULIN SER CALC-MCNC: 4.2 G/DL (ref 2–4)
GLUCOSE SERPL-MCNC: 103 MG/DL (ref 65–100)
HBA1C MFR BLD: 6.5 % (ref 4–5.6)
HDLC SERPL-MCNC: 57 MG/DL
HDLC SERPL: 4 (ref 0–5)
LDLC SERPL CALC-MCNC: 148 MG/DL (ref 0–100)
POTASSIUM SERPL-SCNC: 3.8 MMOL/L (ref 3.5–5.1)
PROT SERPL-MCNC: 8.1 G/DL (ref 6.4–8.2)
SODIUM SERPL-SCNC: 138 MMOL/L (ref 136–145)
TRIGL SERPL-MCNC: 120 MG/DL (ref ?–150)
VLDLC SERPL CALC-MCNC: 24 MG/DL

## 2023-05-08 ENCOUNTER — OFFICE VISIT (OUTPATIENT)
Age: 73
End: 2023-05-08
Payer: COMMERCIAL

## 2023-05-08 VITALS
RESPIRATION RATE: 16 BRPM | HEART RATE: 88 BPM | WEIGHT: 171 LBS | SYSTOLIC BLOOD PRESSURE: 106 MMHG | DIASTOLIC BLOOD PRESSURE: 69 MMHG | BODY MASS INDEX: 29.19 KG/M2 | HEIGHT: 64 IN | TEMPERATURE: 98.6 F

## 2023-05-08 DIAGNOSIS — I10 ESSENTIAL (PRIMARY) HYPERTENSION: ICD-10-CM

## 2023-05-08 DIAGNOSIS — D32.9 MENINGIOMA (HCC): ICD-10-CM

## 2023-05-08 DIAGNOSIS — E78.2 MIXED HYPERLIPIDEMIA: ICD-10-CM

## 2023-05-08 DIAGNOSIS — E11.9 TYPE 2 DIABETES MELLITUS WITHOUT COMPLICATION, WITHOUT LONG-TERM CURRENT USE OF INSULIN (HCC): Primary | ICD-10-CM

## 2023-05-08 PROCEDURE — 1036F TOBACCO NON-USER: CPT | Performed by: INTERNAL MEDICINE

## 2023-05-08 PROCEDURE — 3074F SYST BP LT 130 MM HG: CPT | Performed by: INTERNAL MEDICINE

## 2023-05-08 PROCEDURE — 3044F HG A1C LEVEL LT 7.0%: CPT | Performed by: INTERNAL MEDICINE

## 2023-05-08 PROCEDURE — 2022F DILAT RTA XM EVC RTNOPTHY: CPT | Performed by: INTERNAL MEDICINE

## 2023-05-08 PROCEDURE — 3078F DIAST BP <80 MM HG: CPT | Performed by: INTERNAL MEDICINE

## 2023-05-08 PROCEDURE — 3017F COLORECTAL CA SCREEN DOC REV: CPT | Performed by: INTERNAL MEDICINE

## 2023-05-08 PROCEDURE — 1090F PRES/ABSN URINE INCON ASSESS: CPT | Performed by: INTERNAL MEDICINE

## 2023-05-08 PROCEDURE — 1123F ACP DISCUSS/DSCN MKR DOCD: CPT | Performed by: INTERNAL MEDICINE

## 2023-05-08 PROCEDURE — 99214 OFFICE O/P EST MOD 30 MIN: CPT | Performed by: INTERNAL MEDICINE

## 2023-05-08 PROCEDURE — G8428 CUR MEDS NOT DOCUMENT: HCPCS | Performed by: INTERNAL MEDICINE

## 2023-05-08 PROCEDURE — G8399 PT W/DXA RESULTS DOCUMENT: HCPCS | Performed by: INTERNAL MEDICINE

## 2023-05-08 PROCEDURE — G8419 CALC BMI OUT NRM PARAM NOF/U: HCPCS | Performed by: INTERNAL MEDICINE

## 2023-05-08 ASSESSMENT — PATIENT HEALTH QUESTIONNAIRE - PHQ9
2. FEELING DOWN, DEPRESSED OR HOPELESS: 0
1. LITTLE INTEREST OR PLEASURE IN DOING THINGS: 0
SUM OF ALL RESPONSES TO PHQ QUESTIONS 1-9: 0
SUM OF ALL RESPONSES TO PHQ9 QUESTIONS 1 & 2: 0

## 2023-05-08 NOTE — PROGRESS NOTES
Afia Machuca was seen today for diabetes and discuss medications. Diagnoses and all orders for this visit:    Type 2 diabetes mellitus without complication, without long-term current use of insulin ([facility] [address]  Patient is doing diet  Metformin gave her weakness so she did not continue  Discussion with patient and was under the impression that I was trying to push medication and on further discussion explained to her that with diabetes we are looking at her overall cardiovascular profile and medications such as GLP-1: Ozempic, Trulicity or SGLT2 inhibitors: Mamadou Buschks or Juan Raisin was offered primarily fo cardiac and renal protection but would also afford diabetes control. After further discussion prefers to stay on diet control for her diabetes which I also told her it was helpful for her heart and kidneys as well. But she defers prescription SGLT2 or GLP-1 for now. She will research and get with me at next visit if she would like to proceed      Meningioma ([facility] [address]  No complaints of headaches and followed by neurosurgery      Mixed hyperlipidemia  Patient has been on atorvastatin and was not able to tolerate due to muscle ache and weakness. Off medication her LDL was 148. We discussed medications statin versus Zetia versus diabetic acid versus Repatha. We discussed mechanism of action of each and she would like to try bempedoic acid if covered by her insurance. Up-to-date did not show evidence to support zetia greater than 79year-old so she defers this  Does not want Repatha injection  Could not tolerate statin    She will try bempedoic acid. This will be a prior authorization and spoke with Haylie Kaba. If this medication is approved she will try for 30 days if it is denied  She will try Colestid. Note that she was also interested in non rx medication.   We discussed omega-3 fatty acids, red yeast rice, berberine, probiotics to adjunct with cholesterol medication.  -     Bempedoic Acid 180 MG TABS; Take 180 mg by

## 2023-05-12 ENCOUNTER — CLINICAL DOCUMENTATION (OUTPATIENT)
Age: 73
End: 2023-05-12

## 2023-05-12 NOTE — PROGRESS NOTES
Meaghan Pinedo (Levon Kang) - [id number]  Nexletol 180MG tablets  Status: PA Response - Approved  Created:  May 8th, 2023 [phone]

## 2023-05-19 ENCOUNTER — CLINICAL DOCUMENTATION (OUTPATIENT)
Age: 73
End: 2023-05-19

## 2023-06-09 ENCOUNTER — TELEPHONE (OUTPATIENT)
Age: 73
End: 2023-06-09

## 2023-06-09 DIAGNOSIS — D32.9 MENINGIOMA (HCC): Primary | ICD-10-CM

## 2023-06-09 NOTE — TELEPHONE ENCOUNTER
Patient requesting referral so she can be seen by a rheumatologist (Dr. Mariah Forte) Doctor states that a referral is needed in order to schedule an appointment.      Phone:  [phone]

## 2023-09-06 RX ORDER — TRIAMTERENE AND HYDROCHLOROTHIAZIDE 37.5; 25 MG/1; MG/1
TABLET ORAL
Qty: 90 TABLET | Refills: 1 | Status: SHIPPED | OUTPATIENT
Start: 2023-09-06

## 2023-09-07 ENCOUNTER — TELEPHONE (OUTPATIENT)
Age: 73
End: 2023-09-07

## 2023-09-07 DIAGNOSIS — M19.90 ARTHRITIS: Primary | ICD-10-CM

## 2023-09-07 NOTE — TELEPHONE ENCOUNTER
Referral order faxed with office notes. Wrote on cover sheet to please contact pt to schedule new pt appt.

## 2023-09-07 NOTE — TELEPHONE ENCOUNTER
Pt states she received a call from [address] [place] [address]/[employer] [facility], they can't see in their office d/t incorrect dx code with order, the dx code on the order was Meningioma. Pt needs a new order sent to them with a dx code related to Rheumatology. I advised I will get this sent over today. Pt was thankful.

## 2023-11-03 ENCOUNTER — TELEPHONE (OUTPATIENT)
Age: 73
End: 2023-11-03

## 2023-11-03 NOTE — TELEPHONE ENCOUNTER
PSR reached out to patient to reschedule appointment due to provider being out of the office - no answer, left detailed VM with date/time change    If patient returns call, please assist in rescheduling if necessar

## 2023-11-17 ENCOUNTER — ANESTHESIA EVENT (OUTPATIENT)
Facility: HOSPITAL | Age: 73
End: 2023-11-17
Payer: MEDICARE

## 2023-11-17 ENCOUNTER — ANESTHESIA (OUTPATIENT)
Facility: HOSPITAL | Age: 73
End: 2023-11-17
Payer: MEDICARE

## 2023-11-17 ENCOUNTER — HOSPITAL ENCOUNTER (OUTPATIENT)
Facility: HOSPITAL | Age: 73
Setting detail: OUTPATIENT SURGERY
Discharge: HOME | End: 2023-11-17
Attending: INTERNAL MEDICINE | Admitting: INTERNAL MEDICINE
Payer: MEDICARE

## 2023-11-17 VITALS
BODY MASS INDEX: 28.3 KG/M2 | DIASTOLIC BLOOD PRESSURE: 74 MMHG | SYSTOLIC BLOOD PRESSURE: 139 MMHG | TEMPERATURE: 98.5 F | HEART RATE: 71 BPM | HEIGHT: 64 IN | WEIGHT: 165.79 LBS | OXYGEN SATURATION: 99 % | RESPIRATION RATE: 18 BRPM

## 2023-11-17 PROCEDURE — 2580000003 HC RX 258: Performed by: INTERNAL MEDICINE

## 2023-11-17 PROCEDURE — 7100000011 HC PHASE II RECOVERY - ADDTL 15 MIN: Performed by: INTERNAL MEDICINE

## 2023-11-17 PROCEDURE — 88305 TISSUE EXAM BY PATHOLOGIST: CPT

## 2023-11-17 PROCEDURE — 3600007512: Performed by: INTERNAL MEDICINE

## 2023-11-17 PROCEDURE — 3600007502: Performed by: INTERNAL MEDICINE

## 2023-11-17 PROCEDURE — 3700000000 HC ANESTHESIA ATTENDED CARE: Performed by: INTERNAL MEDICINE

## 2023-11-17 PROCEDURE — 3700000001 HC ADD 15 MINUTES (ANESTHESIA): Performed by: INTERNAL MEDICINE

## 2023-11-17 PROCEDURE — 6360000002 HC RX W HCPCS: Performed by: NURSE ANESTHETIST, CERTIFIED REGISTERED

## 2023-11-17 PROCEDURE — 7100000010 HC PHASE II RECOVERY - FIRST 15 MIN: Performed by: INTERNAL MEDICINE

## 2023-11-17 RX ORDER — LIDOCAINE HCL/PF 100 MG/5ML
SYRINGE (ML) INJECTION PRN
Status: DISCONTINUED | OUTPATIENT
Start: 2023-11-17 | End: 2023-11-17 | Stop reason: SDUPTHER

## 2023-11-17 RX ORDER — PROPOFOL 10 MG/ML
INJECTION, EMULSION INTRAVENOUS CONTINUOUS PRN
Status: DISCONTINUED | OUTPATIENT
Start: 2023-11-17 | End: 2023-11-17 | Stop reason: SDUPTHER

## 2023-11-17 RX ORDER — SODIUM CHLORIDE 9 MG/ML
INJECTION, SOLUTION INTRAVENOUS CONTINUOUS
Status: DISCONTINUED | OUTPATIENT
Start: 2023-11-17 | End: 2023-11-17 | Stop reason: HOSPADM

## 2023-11-17 RX ADMIN — SODIUM CHLORIDE: 9 INJECTION, SOLUTION INTRAVENOUS at 10:48

## 2023-11-17 RX ADMIN — LIDOCAINE HYDROCHLORIDE 50 MG: 20 INJECTION INTRAVENOUS at 10:54

## 2023-11-17 RX ADMIN — PROPOFOL 25 MG: 10 INJECTION, EMULSION INTRAVENOUS at 10:56

## 2023-11-17 RX ADMIN — PROPOFOL 150 MCG/KG/MIN: 10 INJECTION, EMULSION INTRAVENOUS at 10:54

## 2023-11-17 RX ADMIN — PROPOFOL 50 MG: 10 INJECTION, EMULSION INTRAVENOUS at 10:53

## 2023-11-17 ASSESSMENT — PAIN - FUNCTIONAL ASSESSMENT: PAIN_FUNCTIONAL_ASSESSMENT: 0-10

## 2023-11-17 NOTE — H&P
Colleen Jonsa M.D.  [phone]    History and Physical       NAME:  Hue Jenkins   :   1950   MRN:   [id number]           Consult Date: 2023 10:38 AM    Chief Complaint:  Colon cancer screening and colon polyp surveillance    History of Present Illness:  Patient is a 74 y.o. who is seen for colon cancer screening and colon polyp surveillance. Denies any ongoing GI complaints. PMH:  Past Medical History:   Diagnosis Date    Arthritis     IN HIPS - resolved since replacement    Brain tumor ([address])     Pt being monitored by Dr Artie Valentino, meningioma    Environmental allergies     GERD (gastroesophageal reflux disease)     High cholesterol     Hypertension     Migraine     \"AFFECTS EYES\"; EPISODES START WITH EAR FULLNESS SENSATION    S/P chemotherapy, time since greater than 12 weeks     Uterine cancer ([address])      was seen by Dr. Vince Carvalho    Vertigo or labyrinthine disorder 10/31/2011    ENT shunt placed on left side behind ear       PSH:  Past Surgical History:   Procedure Laterality Date    [facility]    COLONOSCOPY  3/12/2010    ([place])-f/u 5 yrs. COLONOSCOPY N/A 2018    COLONOSCOPY performed by Tomi Dunne MD at [facility]  2012    shunt placed behind left ear to help with vertigo    HYSTERECTOMY (CERVIX STATUS UNKNOWN)  [facility]      right ARMIDA    TOTAL HIP ARTHROPLASTY Right 2017       Allergies: Allergies   Allergen Reactions    Buspirone Other (See Comments)     Altered mental status       Home Medications:  Prior to Admission Medications   Prescriptions Last Dose Informant Patient Reported? Taking?    Bempedoic Acid 180 MG TABS   No No   Sig: Take 180 mg by mouth daily   atorvastatin (LIPITOR) 10 MG tablet   Yes No   Sig: Take by mouth daily   Patient not taking: Reported on 2023   pantoprazole (PROTONIX) 40 MG tablet Unknown  Yes No   Sig: Take 1 tablet by mouth daily pneumococcal 20-valent conjugat (PREVNAR 20) 0.5 ML PARESH inj   Yes No   Sig: Inject 0.5 mLs into the muscle   Patient not taking: Reported on 2023   potassium gluconate 550 mg tablet Past Week  Yes No   Sig: Take 99 mg by mouth daily   triamterene-hydroCHLOROthiazide (MAXZIDE-25) 37.5-25 MG per tablet 2023  No No   Sig: TAKE ONE TABLET BY MOUTH DAILY   verapamil (CALAN) 40 MG tablet 2023  Yes No   Sig: ENT RX: TAKE 1 TABLET BY MOUTH ONCE DAILY   zoster recombinant adjuvanted vaccine [facility]) 50 MCG/0.5ML SUSR injection   Yes No   Si.5mL by IntraMUSCular route once now and then repeat in 2-6 months   Patient not taking: Reported on 2023      Facility-Administered Medications: None       Hospital Medications:  No current facility-administered medications for this encounter.        Social History:  Social History     Tobacco Use    Smoking status: Former     Packs/day: .5     Types: Cigarettes     Quit date: 1994     Years since quittin.8    Smokeless tobacco: Never   Substance Use Topics    Alcohol use: No     Alcohol/week: 0.0 standard drinks of alcohol       Family History:  Family History   Problem Relation Age of Onset    Breast Cancer Other     Anesth Problems Neg Hx     Breast Cancer Other     Heart Disease Brother     Breast Cancer Sister 61    Hypertension Sister     Heart Disease Sister     Elevated Lipids Sister     Diabetes Sister     Cancer Father         kidney    Diabetes Father     Heart Disease Mother     Cancer Mother         colon    Stroke Mother              Review of Systems:      Constitutional: negative fever, negative chills, negative weight loss  Eyes:   negative visual changes  ENT:   negative sore throat, tongue or lip swelling  Respiratory:  negative cough, negative dyspnea  Cards:  negative for chest pain, palpitations, lower extremity edema  GI:   See HPI  :  negative for frequency, dysuria  Integument:  negative for rash and pruritus  Heme:  negative

## 2023-11-17 NOTE — DISCHARGE INSTRUCTIONS
[facility] Madelyn Clemens M.D.  [phone]            COLON DISCHARGE INSTRUCTIONS       2023    Jasmyn Bryant  :  1950  Tony Medical Record Number:  [id number]      COLONOSCOPY FINDINGS:  Your colonoscopy showed one diminutive polyp that was removed and sent to pathology, diverticulosis and small internal hemorrhoids. DISCOMFORT:  Redness at IV site- apply warm compress to area; if redness or soreness persist- contact your physician  There may be a slight amount of blood passed from the rectum  Gaseous discomfort- walking, belching will help relieve any discomfort  You may not operate a vehicle for 12 hours  You may not engage in an occupation involving machinery or appliances for rest of today  You may not drink alcoholic beverages for at least 12 hours  Avoid making any critical decisions for at least 24 hour  DIET:   High fiber diet   - however -  remember your colon is empty and a heavy meal will produce gas. Avoid these foods:  vegetables, fried / greasy foods, carbonated drinks for today     ACTIVITY:  You may resume your normal daily activities it is recommended that you spend the remainder of the day resting -  avoid any strenuous activity. CALL M.D. ANY SIGN OF:   Increasing pain, nausea, vomiting  Abdominal distension (swelling)  New increased bleeding (oral or rectal)  Fever (chills)  Pain in chest area  Bloody discharge from nose or mouth   Shortness of breath    Follow-up Instructions:   Call Dr. Bekah Santana if any questions or problems. Telephone # [phone]  Biopsy results will be available in  5 to 7 days  Repeat colonoscopy in 5 years.

## 2023-11-17 NOTE — PERIOP NOTE
1049 Patient has been evaluated by anesthesia pre-procedure. Patient alert and oriented. Vital signs will not be charted by the Endoscopy nurse. All vitals, airway, and loc are monitored by anesthesia staff, Houston Rodriguez, throughout procedure. 12   Endoscope was pre-cleaned at bedside immediately following procedure by endo tech [facility]. 1112   Patient tolerated procedure. Abdomen soft and patient arousable and voices no complaints. Patient transported to endoscopy recovery area. Report given to post procedure RN, Olinda Osgood.

## 2023-11-17 NOTE — ANESTHESIA POSTPROCEDURE EVALUATION
Department of Anesthesiology  Postprocedure Note    Patient: Lilly Mari  MRN: [id number]  YOB: 1950  Date of evaluation: 11/17/2023      Procedure Summary     Date: 11/17/23 Room / Location: [facility] ENDO  / [facility] ENDOSCOPY    Anesthesia Start: 1048 Anesthesia Stop: 1110    Procedures:       COLONOSCOPY (Lower GI Region)      COLONOSCOPY POLYPECTOMY SNARE/COLD BIOPSY (Lower GI Region) Diagnosis:       Personal history of colonic polyps      (Personal history of colonic polyps [Z86.010])    Surgeons: Kailey Crook MD Responsible Provider: Dennis Ly MD    Anesthesia Type: MAC ASA Status: 2          Anesthesia Type: No value filed.     Antonieta Phase I:      Antonieta Phase II: Antonieta Score: 10      Anesthesia Post Evaluation    Patient location during evaluation: PACU  Patient participation: complete - patient participated  Level of consciousness: awake  Airway patency: patent  Nausea & Vomiting: no vomiting and no nausea  Complications: no  Cardiovascular status: hemodynamically stable  Respiratory status: acceptable  Hydration status: stable  Pain management: adequate

## 2023-11-17 NOTE — OP NOTE
Maliha Lambert M.D.  [phone]            2023          Colonoscopy Operative Report  Maxine Osuna  :  1950  [facility] Medical Record Number:  [id number]      Indications:    Personal history of colon polyps (screening only)     :  Halina Weems MD    Referring Provider: Suleman Rosa MD    Sedation:  MAC anesthesia    Pre-Procedural Exam:      Airway: clear,  No airway problems anticipated  Heart: RRR, without gallops or rubs  Lungs: clear bilaterally without wheezes, crackles, or rhonchi  Abdomen: soft, nontender, nondistended, bowel sounds present  Mental Status: awake, alert and oriented to person, place and time     Procedure Details:  After informed consent was obtained with all risks and benefits of procedure explained and preoperative exam completed, the patient was taken to the endoscopy suite and placed in the left lateral decubitus position. Upon sequential sedation as per above, a digital rectal exam was performed. The Olympus videocolonoscope  was inserted in the rectum and carefully advanced to the cecum, which was identified by the ileocecal valve and appendiceal orifice. The quality of preparation was good. The colonoscope was slowly withdrawn with careful inspection and evaluation between folds. Retroflexion in the rectum was performed. Findings:   Terminal Ileum: normal  Cecum: normal  Ascending Colon: normal  Transverse Colon: normal  Descending Colon: 1  Sessile polyp(s), the largest 2 mm in size;  Sigmoid: no mucosal lesion appreciated  moderate diverticulosis; Rectum: no mucosal lesion appreciated  Grade 1 internal hemorrhoid(s);     Interventions:  1 complete polypectomy were performed using cold biopsy forceps and the polyps were  retrieved    Specimen Removed:  specimen #1, Random colon biopsies                                       Specimen #2, 2 mm in size, located in the descending colon removed by cold biopsy and

## 2023-12-12 ENCOUNTER — OFFICE VISIT (OUTPATIENT)
Age: 73
End: 2023-12-12
Payer: MEDICARE

## 2023-12-12 VITALS
BODY MASS INDEX: 28.38 KG/M2 | HEIGHT: 64 IN | SYSTOLIC BLOOD PRESSURE: 129 MMHG | WEIGHT: 166.2 LBS | TEMPERATURE: 98.2 F | DIASTOLIC BLOOD PRESSURE: 85 MMHG | HEART RATE: 80 BPM | OXYGEN SATURATION: 97 % | RESPIRATION RATE: 14 BRPM

## 2023-12-12 DIAGNOSIS — M54.42 CHRONIC BILATERAL LOW BACK PAIN WITH BILATERAL SCIATICA: ICD-10-CM

## 2023-12-12 DIAGNOSIS — E78.2 MIXED HYPERLIPIDEMIA: ICD-10-CM

## 2023-12-12 DIAGNOSIS — E11.9 TYPE 2 DIABETES MELLITUS WITHOUT COMPLICATION, WITHOUT LONG-TERM CURRENT USE OF INSULIN (HCC): ICD-10-CM

## 2023-12-12 DIAGNOSIS — I10 ESSENTIAL (PRIMARY) HYPERTENSION: ICD-10-CM

## 2023-12-12 DIAGNOSIS — M54.41 CHRONIC BILATERAL LOW BACK PAIN WITH BILATERAL SCIATICA: ICD-10-CM

## 2023-12-12 DIAGNOSIS — R53.83 OTHER FATIGUE: Primary | ICD-10-CM

## 2023-12-12 DIAGNOSIS — G89.29 CHRONIC BILATERAL LOW BACK PAIN WITH BILATERAL SCIATICA: ICD-10-CM

## 2023-12-12 DIAGNOSIS — R53.83 OTHER FATIGUE: ICD-10-CM

## 2023-12-12 LAB
ALBUMIN SERPL-MCNC: 4 G/DL (ref 3.5–5)
ALBUMIN/GLOB SERPL: 1 (ref 1.1–2.2)
ALP SERPL-CCNC: 62 U/L (ref 45–117)
ALT SERPL-CCNC: 22 U/L (ref 12–78)
ANION GAP SERPL CALC-SCNC: 3 MMOL/L (ref 5–15)
AST SERPL-CCNC: 14 U/L (ref 15–37)
BILIRUB SERPL-MCNC: 0.5 MG/DL (ref 0.2–1)
BUN SERPL-MCNC: 20 MG/DL (ref 6–20)
BUN/CREAT SERPL: 19 (ref 12–20)
CALCIUM SERPL-MCNC: 9.5 MG/DL (ref 8.5–10.1)
CHLORIDE SERPL-SCNC: 109 MMOL/L (ref 97–108)
CO2 SERPL-SCNC: 25 MMOL/L (ref 21–32)
CREAT SERPL-MCNC: 1.03 MG/DL (ref 0.55–1.02)
ERYTHROCYTE [DISTWIDTH] IN BLOOD BY AUTOMATED COUNT: 15.5 % (ref 11.5–14.5)
EST. AVERAGE GLUCOSE BLD GHB EST-MCNC: 128 MG/DL
GLOBULIN SER CALC-MCNC: 4 G/DL (ref 2–4)
GLUCOSE SERPL-MCNC: 101 MG/DL (ref 65–100)
HBA1C MFR BLD: 6.1 % (ref 4–5.6)
HCT VFR BLD AUTO: 38.6 % (ref 35–47)
HGB BLD-MCNC: 12.3 G/DL (ref 11.5–16)
MCH RBC QN AUTO: 25.5 PG (ref 26–34)
MCHC RBC AUTO-ENTMCNC: 31.9 G/DL (ref 30–36.5)
MCV RBC AUTO: 80.1 FL (ref 80–99)
NRBC # BLD: 0 K/UL (ref 0–0.01)
NRBC BLD-RTO: 0 PER 100 WBC
PLATELET # BLD AUTO: 320 K/UL (ref 150–400)
PMV BLD AUTO: 10.3 FL (ref 8.9–12.9)
POTASSIUM SERPL-SCNC: 3.7 MMOL/L (ref 3.5–5.1)
PROT SERPL-MCNC: 8 G/DL (ref 6.4–8.2)
RBC # BLD AUTO: 4.82 M/UL (ref 3.8–5.2)
SODIUM SERPL-SCNC: 137 MMOL/L (ref 136–145)
T4 FREE SERPL-MCNC: 1 NG/DL (ref 0.8–1.5)
TSH SERPL DL<=0.05 MIU/L-ACNC: 1.86 UIU/ML (ref 0.36–3.74)
WBC # BLD AUTO: 6 K/UL (ref 3.6–11)

## 2023-12-12 PROCEDURE — 99214 OFFICE O/P EST MOD 30 MIN: CPT | Performed by: INTERNAL MEDICINE

## 2023-12-12 PROCEDURE — 1036F TOBACCO NON-USER: CPT | Performed by: INTERNAL MEDICINE

## 2023-12-12 PROCEDURE — G8399 PT W/DXA RESULTS DOCUMENT: HCPCS | Performed by: INTERNAL MEDICINE

## 2023-12-12 PROCEDURE — 3017F COLORECTAL CA SCREEN DOC REV: CPT | Performed by: INTERNAL MEDICINE

## 2023-12-12 PROCEDURE — G8484 FLU IMMUNIZE NO ADMIN: HCPCS | Performed by: INTERNAL MEDICINE

## 2023-12-12 PROCEDURE — G8419 CALC BMI OUT NRM PARAM NOF/U: HCPCS | Performed by: INTERNAL MEDICINE

## 2023-12-12 PROCEDURE — 3044F HG A1C LEVEL LT 7.0%: CPT | Performed by: INTERNAL MEDICINE

## 2023-12-12 PROCEDURE — 3079F DIAST BP 80-89 MM HG: CPT | Performed by: INTERNAL MEDICINE

## 2023-12-12 PROCEDURE — 3074F SYST BP LT 130 MM HG: CPT | Performed by: INTERNAL MEDICINE

## 2023-12-12 PROCEDURE — 2022F DILAT RTA XM EVC RTNOPTHY: CPT | Performed by: INTERNAL MEDICINE

## 2023-12-12 PROCEDURE — 1090F PRES/ABSN URINE INCON ASSESS: CPT | Performed by: INTERNAL MEDICINE

## 2023-12-12 PROCEDURE — G8428 CUR MEDS NOT DOCUMENT: HCPCS | Performed by: INTERNAL MEDICINE

## 2023-12-12 PROCEDURE — 1123F ACP DISCUSS/DSCN MKR DOCD: CPT | Performed by: INTERNAL MEDICINE

## 2023-12-12 ASSESSMENT — PATIENT HEALTH QUESTIONNAIRE - PHQ9
SUM OF ALL RESPONSES TO PHQ QUESTIONS 1-9: 0
SUM OF ALL RESPONSES TO PHQ QUESTIONS 1-9: 0
1. LITTLE INTEREST OR PLEASURE IN DOING THINGS: 0
SUM OF ALL RESPONSES TO PHQ9 QUESTIONS 1 & 2: 0
SUM OF ALL RESPONSES TO PHQ QUESTIONS 1-9: 0
SUM OF ALL RESPONSES TO PHQ QUESTIONS 1-9: 0
2. FEELING DOWN, DEPRESSED OR HOPELESS: 0

## 2023-12-27 ENCOUNTER — TELEPHONE (OUTPATIENT)
Age: 73
End: 2023-12-27

## 2024-01-08 ENCOUNTER — TRANSCRIBE ORDERS (OUTPATIENT)
Facility: HOSPITAL | Age: 74
End: 2024-01-08

## 2024-01-08 DIAGNOSIS — Z12.31 SCREENING MAMMOGRAM, ENCOUNTER FOR: Primary | ICD-10-CM

## 2024-01-29 NOTE — PROGRESS NOTES
ASSESSMENT & PLAN:  1. Other fatigue  Multifactorial  Will check thyroid  Having chronic back pain and not optimally controlled which may be contributing  History of diet-controlled diabetes and will assess to see control which may also be hyperglycemia-     TSH; Future  -     T4, Free; Future  -     CBC; Future  -     Comprehensive Metabolic Panel; Future  2. Mixed hyperlipidemia  She was not able to tolerate atorvastatin  She is trying to eat a heart healthy diet  3. Essential (primary) hypertension  Controlled continue Maxide-     Comprehensive Metabolic Panel; Future  4. Chronic bilateral low back pain with bilateral sciatica  Patient with chronic symptoms and discussed treatment options. We discussed physical therapy versus medication/Cymbalta versus EVERARDO versus pain clinic  Patient deferred physical therapy as cost prohibitive, did not want to initiate medications although Cymbalta may be a good alternative for her. Defers EVERARDO from orthopedics and will work with pain clinic for evaluation    -     External Referral To Pain Clinic  5. Type 2 diabetes mellitus without complication, without long-term current use of insulin (HCC)  Continue Dash diet-     Hemoglobin A1C; Future        Patient is overall controlled. Follow-up in 6 months or earlier if needed    Chief Complaint   Patient presents with    Hypertension    Medication Evaluation     Fatigue  Patient reports her fatigue symptoms started after her colonoscopy on November 17. Nov 17 colonscopy, 1 polyp and recheck in 5 years  Fatigue, unusual fatigue  Had cold from grandkids and symptoms have not improved  Sx for 3 weeks and not better  Denies weight loss, fevers or night sweats. Low back pain  Pulled back last December  Sx occurred in March  She was seen by orthopedics and offered injection however deferred. She also deferred physical therapy      Hyperlipdimia  She was unable to take atorvastatin due to muscle ache and muscle weakness.   We Stable

## 2024-02-02 ENCOUNTER — HOSPITAL ENCOUNTER (OUTPATIENT)
Facility: HOSPITAL | Age: 74
End: 2024-02-02
Attending: INTERNAL MEDICINE
Payer: MEDICARE

## 2024-02-02 DIAGNOSIS — Z12.31 SCREENING MAMMOGRAM, ENCOUNTER FOR: ICD-10-CM

## 2024-02-02 PROCEDURE — 77063 BREAST TOMOSYNTHESIS BI: CPT

## 2024-02-14 ENCOUNTER — TELEPHONE (OUTPATIENT)
Age: 74
End: 2024-02-14

## 2024-02-14 DIAGNOSIS — D72.9 ABNORMAL WHITE BLOOD CELL: Primary | ICD-10-CM

## 2024-02-14 NOTE — TELEPHONE ENCOUNTER
The patient will like a callback to discuss information on a rheumatologist. PSR offered her an appointment she declined  [phone] or [phone]

## 2024-02-19 ENCOUNTER — TELEPHONE (OUTPATIENT)
Age: 74
End: 2024-02-19

## 2024-02-19 DIAGNOSIS — D72.9 ABNORMAL WHITE BLOOD CELL COUNT: Primary | ICD-10-CM

## 2024-02-19 NOTE — TELEPHONE ENCOUNTER
Patient is requesting to change providers, she would like to see Dr Krueger.     Call to let patient know  Kylee [phone]

## 2024-02-20 NOTE — TELEPHONE ENCOUNTER
PSR reached out to patient with information about Rheumatology referral - thankful for call back and waiting for  to inform about potentially switching providers.

## 2024-02-20 NOTE — TELEPHONE ENCOUNTER
Patient is calling back to ask about switching providers as well as what rheumatologist we sent the referral to and who can she call contact

## 2024-03-07 RX ORDER — TRIAMTERENE AND HYDROCHLOROTHIAZIDE 37.5; 25 MG/1; MG/1
1 TABLET ORAL DAILY
Qty: 90 TABLET | Refills: 1 | Status: SHIPPED | OUTPATIENT
Start: 2024-03-07

## 2024-05-03 ENCOUNTER — OFFICE VISIT (OUTPATIENT)
Age: 74
End: 2024-05-03
Payer: MEDICARE

## 2024-05-03 VITALS
OXYGEN SATURATION: 96 % | BODY MASS INDEX: 28.31 KG/M2 | HEIGHT: 64 IN | HEART RATE: 81 BPM | RESPIRATION RATE: 14 BRPM | DIASTOLIC BLOOD PRESSURE: 82 MMHG | SYSTOLIC BLOOD PRESSURE: 133 MMHG | WEIGHT: 165.8 LBS

## 2024-05-03 DIAGNOSIS — R00.2 HEART PALPITATIONS: ICD-10-CM

## 2024-05-03 DIAGNOSIS — I10 PRIMARY HYPERTENSION: ICD-10-CM

## 2024-05-03 DIAGNOSIS — M48.061 SPINAL STENOSIS OF LUMBAR REGION WITHOUT NEUROGENIC CLAUDICATION: Primary | ICD-10-CM

## 2024-05-03 DIAGNOSIS — N18.31 STAGE 3A CHRONIC KIDNEY DISEASE (HCC): ICD-10-CM

## 2024-05-03 DIAGNOSIS — N18.31 TYPE 2 DIABETES MELLITUS WITH STAGE 3A CHRONIC KIDNEY DISEASE, WITHOUT LONG-TERM CURRENT USE OF INSULIN (HCC): ICD-10-CM

## 2024-05-03 DIAGNOSIS — D32.9 MENINGIOMA (HCC): ICD-10-CM

## 2024-05-03 DIAGNOSIS — E78.49 OTHER HYPERLIPIDEMIA: ICD-10-CM

## 2024-05-03 DIAGNOSIS — E11.22 TYPE 2 DIABETES MELLITUS WITH STAGE 3A CHRONIC KIDNEY DISEASE, WITHOUT LONG-TERM CURRENT USE OF INSULIN (HCC): ICD-10-CM

## 2024-05-03 PROBLEM — Z71.89 ADVANCED CARE PLANNING/COUNSELING DISCUSSION: Status: RESOLVED | Noted: 2017-02-02 | Resolved: 2024-05-03

## 2024-05-03 LAB
ANION GAP SERPL CALC-SCNC: 5 MMOL/L (ref 5–15)
BUN SERPL-MCNC: 22 MG/DL (ref 6–20)
BUN/CREAT SERPL: 21 (ref 12–20)
CALCIUM SERPL-MCNC: 10.1 MG/DL (ref 8.5–10.1)
CHLORIDE SERPL-SCNC: 107 MMOL/L (ref 97–108)
CHOLEST SERPL-MCNC: 261 MG/DL
CO2 SERPL-SCNC: 26 MMOL/L (ref 21–32)
CREAT SERPL-MCNC: 1.07 MG/DL (ref 0.55–1.02)
CREAT UR-MCNC: 77 MG/DL
EST. AVERAGE GLUCOSE BLD GHB EST-MCNC: 131 MG/DL
GLUCOSE SERPL-MCNC: 102 MG/DL (ref 65–100)
HBA1C MFR BLD: 6.2 % (ref 4–5.6)
HDLC SERPL-MCNC: 71 MG/DL
HDLC SERPL: 3.7 (ref 0–5)
LDLC SERPL CALC-MCNC: 168 MG/DL (ref 0–100)
MAGNESIUM SERPL-MCNC: 2.2 MG/DL (ref 1.6–2.4)
MICROALBUMIN UR-MCNC: 1.16 MG/DL
MICROALBUMIN/CREAT UR-RTO: 15 MG/G (ref 0–30)
POTASSIUM SERPL-SCNC: 3.4 MMOL/L (ref 3.5–5.1)
SODIUM SERPL-SCNC: 138 MMOL/L (ref 136–145)
SPECIMEN HOLD: NORMAL
TRIGL SERPL-MCNC: 110 MG/DL
VLDLC SERPL CALC-MCNC: 22 MG/DL

## 2024-05-03 PROCEDURE — G8427 DOCREV CUR MEDS BY ELIG CLIN: HCPCS | Performed by: INTERNAL MEDICINE

## 2024-05-03 PROCEDURE — 99214 OFFICE O/P EST MOD 30 MIN: CPT | Performed by: INTERNAL MEDICINE

## 2024-05-03 PROCEDURE — G8419 CALC BMI OUT NRM PARAM NOF/U: HCPCS | Performed by: INTERNAL MEDICINE

## 2024-05-03 PROCEDURE — 1123F ACP DISCUSS/DSCN MKR DOCD: CPT | Performed by: INTERNAL MEDICINE

## 2024-05-03 PROCEDURE — 3075F SYST BP GE 130 - 139MM HG: CPT | Performed by: INTERNAL MEDICINE

## 2024-05-03 PROCEDURE — 3079F DIAST BP 80-89 MM HG: CPT | Performed by: INTERNAL MEDICINE

## 2024-05-03 PROCEDURE — 1090F PRES/ABSN URINE INCON ASSESS: CPT | Performed by: INTERNAL MEDICINE

## 2024-05-03 PROCEDURE — 1036F TOBACCO NON-USER: CPT | Performed by: INTERNAL MEDICINE

## 2024-05-03 PROCEDURE — G8399 PT W/DXA RESULTS DOCUMENT: HCPCS | Performed by: INTERNAL MEDICINE

## 2024-05-03 RX ORDER — DULOXETIN HYDROCHLORIDE 30 MG/1
30 CAPSULE, DELAYED RELEASE ORAL DAILY
Qty: 30 CAPSULE | Refills: 1 | Status: SHIPPED | OUTPATIENT
Start: 2024-05-03

## 2024-05-03 SDOH — ECONOMIC STABILITY: FOOD INSECURITY: WITHIN THE PAST 12 MONTHS, YOU WORRIED THAT YOUR FOOD WOULD RUN OUT BEFORE YOU GOT MONEY TO BUY MORE.: NEVER TRUE

## 2024-05-03 SDOH — ECONOMIC STABILITY: FOOD INSECURITY: WITHIN THE PAST 12 MONTHS, THE FOOD YOU BOUGHT JUST DIDN'T LAST AND YOU DIDN'T HAVE MONEY TO GET MORE.: NEVER TRUE

## 2024-05-03 SDOH — ECONOMIC STABILITY: HOUSING INSECURITY
IN THE LAST 12 MONTHS, WAS THERE A TIME WHEN YOU DID NOT HAVE A STEADY PLACE TO SLEEP OR SLEPT IN A SHELTER (INCLUDING NOW)?: NO

## 2024-05-03 SDOH — ECONOMIC STABILITY: INCOME INSECURITY: HOW HARD IS IT FOR YOU TO PAY FOR THE VERY BASICS LIKE FOOD, HOUSING, MEDICAL CARE, AND HEATING?: NOT HARD AT ALL

## 2024-05-03 ASSESSMENT — PATIENT HEALTH QUESTIONNAIRE - PHQ9
2. FEELING DOWN, DEPRESSED OR HOPELESS: NOT AT ALL
SUM OF ALL RESPONSES TO PHQ QUESTIONS 1-9: 0
SUM OF ALL RESPONSES TO PHQ9 QUESTIONS 1 & 2: 0
1. LITTLE INTEREST OR PLEASURE IN DOING THINGS: NOT AT ALL
SUM OF ALL RESPONSES TO PHQ QUESTIONS 1-9: 0

## 2024-05-03 NOTE — PROGRESS NOTES
Kylee Mcrae (:  1950) is a 74 y.o. female,New patient, here for evaluation of the following chief complaint(s):  New Patient      Assessment & Plan     1. Spinal stenosis of lumbar region without neurogenic claudication  Chronic, low back pain.  Physical therapy is not possible right now due to cost.  She has declined epidural steroid injections right now as well.  We discussed the benefits and possible side effects of duloxetine and she is interested in starting the medication.  She will start duloxetine 30 mg once daily and follow-up in about a month.  - DULoxetine (CYMBALTA) 30 MG extended release capsule; Take 1 capsule by mouth daily  Dispense: 30 capsule; Refill: 1    2. Heart palpitations  Likely ectopy.  This is intermittent, brief, nonexertional.  She has been on magnesium in addition to her potassium supplement.  Will check BMP and magnesium levels today.  I asked her to follow-up with me if she has more frequent, daily symptoms.  - Basic Metabolic Panel; Future  - Magnesium; Future    3. Type 2 diabetes mellitus with stage 3a chronic kidney disease, without long-term current use of insulin (HCC)  Previously very well-controlled with diet only.  A1c was improved at 6.1% in December.  Continue current regimen, pending labs today.  Dilated eye exam is up-to-date.  - Basic Metabolic Panel; Future  - Microalbumin / Creatinine Urine Ratio; Future  - Hemoglobin A1C; Future  - Lipid Panel; Future    4. Other hyperlipidemia  She is fasting today, so we will have a lipid panel.  She has not tolerated 2 different statins.  We could consider twice weekly rosuvastatin if she is willing in the future.  - Lipid Panel; Future    5. Primary hypertension  Controlled, on verapamil and triamterene/hydrochlorothiazide.  Continue current medications, pending labs.  - Basic Metabolic Panel; Future    6. Stage 3a chronic kidney disease (HCC)  Very mild, stage IIIa chronic kidney disease.  Would have a low

## 2024-09-03 ENCOUNTER — TELEPHONE (OUTPATIENT)
Age: 74
End: 2024-09-03

## 2024-09-03 RX ORDER — TRIAMTERENE/HYDROCHLOROTHIAZID 37.5-25 MG
1 TABLET ORAL DAILY
Qty: 90 TABLET | Refills: 4 | Status: SHIPPED | OUTPATIENT
Start: 2024-09-03

## 2024-09-03 NOTE — TELEPHONE ENCOUNTER
Medication refill for      triamterene-hydroCHLOROthiazide (MAXZIDE-25) 37.5-25 MG per tablet      [facility] [id number] - [place], [place] - [address] - P [phone] - F [phone] [[id number]]

## 2024-09-09 RX ORDER — TRIAMTERENE/HYDROCHLOROTHIAZID 37.5-25 MG
1 TABLET ORAL DAILY
Qty: 90 TABLET | Refills: 4 | OUTPATIENT
Start: 2024-09-09

## 2024-09-30 ENCOUNTER — TELEPHONE (OUTPATIENT)
Age: 74
End: 2024-09-30

## 2024-09-30 ENCOUNTER — OFFICE VISIT (OUTPATIENT)
Age: 74
End: 2024-09-30
Payer: MEDICARE

## 2024-09-30 VITALS
DIASTOLIC BLOOD PRESSURE: 84 MMHG | SYSTOLIC BLOOD PRESSURE: 129 MMHG | HEART RATE: 72 BPM | RESPIRATION RATE: 14 BRPM | OXYGEN SATURATION: 97 % | HEIGHT: 64 IN | BODY MASS INDEX: 28.48 KG/M2 | WEIGHT: 166.8 LBS

## 2024-09-30 DIAGNOSIS — N18.31 STAGE 3A CHRONIC KIDNEY DISEASE (HCC): ICD-10-CM

## 2024-09-30 DIAGNOSIS — E11.22 TYPE 2 DIABETES MELLITUS WITH STAGE 3A CHRONIC KIDNEY DISEASE, WITHOUT LONG-TERM CURRENT USE OF INSULIN (HCC): ICD-10-CM

## 2024-09-30 DIAGNOSIS — M54.42 CHRONIC BILATERAL LOW BACK PAIN WITH BILATERAL SCIATICA: ICD-10-CM

## 2024-09-30 DIAGNOSIS — N18.31 TYPE 2 DIABETES MELLITUS WITH STAGE 3A CHRONIC KIDNEY DISEASE, WITHOUT LONG-TERM CURRENT USE OF INSULIN (HCC): ICD-10-CM

## 2024-09-30 DIAGNOSIS — M54.41 CHRONIC BILATERAL LOW BACK PAIN WITH BILATERAL SCIATICA: ICD-10-CM

## 2024-09-30 DIAGNOSIS — I10 PRIMARY HYPERTENSION: ICD-10-CM

## 2024-09-30 DIAGNOSIS — G89.29 CHRONIC BILATERAL LOW BACK PAIN WITH BILATERAL SCIATICA: ICD-10-CM

## 2024-09-30 DIAGNOSIS — Z00.00 MEDICARE ANNUAL WELLNESS VISIT, SUBSEQUENT: Primary | ICD-10-CM

## 2024-09-30 LAB
ALBUMIN SERPL-MCNC: 4.1 G/DL (ref 3.5–5)
ALBUMIN/GLOB SERPL: 1.1 (ref 1.1–2.2)
ALP SERPL-CCNC: 72 U/L (ref 45–117)
ALT SERPL-CCNC: 19 U/L (ref 12–78)
ANION GAP SERPL CALC-SCNC: 6 MMOL/L (ref 2–12)
AST SERPL-CCNC: 14 U/L (ref 15–37)
BASOPHILS # BLD: 0 K/UL (ref 0–0.1)
BASOPHILS NFR BLD: 1 % (ref 0–1)
BILIRUB SERPL-MCNC: 0.5 MG/DL (ref 0.2–1)
BUN SERPL-MCNC: 21 MG/DL (ref 6–20)
BUN/CREAT SERPL: 19 (ref 12–20)
CALCIUM SERPL-MCNC: 10 MG/DL (ref 8.5–10.1)
CHLORIDE SERPL-SCNC: 107 MMOL/L (ref 97–108)
CO2 SERPL-SCNC: 27 MMOL/L (ref 21–32)
CREAT SERPL-MCNC: 1.12 MG/DL (ref 0.55–1.02)
DIFFERENTIAL METHOD BLD: ABNORMAL
EOSINOPHIL # BLD: 0.2 K/UL (ref 0–0.4)
EOSINOPHIL NFR BLD: 3 % (ref 0–7)
ERYTHROCYTE [DISTWIDTH] IN BLOOD BY AUTOMATED COUNT: 15 % (ref 11.5–14.5)
EST. AVERAGE GLUCOSE BLD GHB EST-MCNC: 137 MG/DL
GLOBULIN SER CALC-MCNC: 3.9 G/DL (ref 2–4)
GLUCOSE SERPL-MCNC: 88 MG/DL (ref 65–100)
HBA1C MFR BLD: 6.4 % (ref 4–5.6)
HCT VFR BLD AUTO: 41.2 % (ref 35–47)
HGB BLD-MCNC: 12.9 G/DL (ref 11.5–16)
IMM GRANULOCYTES # BLD AUTO: 0 K/UL (ref 0–0.04)
IMM GRANULOCYTES NFR BLD AUTO: 0 % (ref 0–0.5)
LYMPHOCYTES # BLD: 3 K/UL (ref 0.8–3.5)
LYMPHOCYTES NFR BLD: 44 % (ref 12–49)
MCH RBC QN AUTO: 25.8 PG (ref 26–34)
MCHC RBC AUTO-ENTMCNC: 31.3 G/DL (ref 30–36.5)
MCV RBC AUTO: 82.4 FL (ref 80–99)
MONOCYTES # BLD: 0.5 K/UL (ref 0–1)
MONOCYTES NFR BLD: 7 % (ref 5–13)
NEUTS SEG # BLD: 3 K/UL (ref 1.8–8)
NEUTS SEG NFR BLD: 45 % (ref 32–75)
NRBC # BLD: 0 K/UL (ref 0–0.01)
NRBC BLD-RTO: 0 PER 100 WBC
PLATELET # BLD AUTO: 288 K/UL (ref 150–400)
PMV BLD AUTO: 10.1 FL (ref 8.9–12.9)
POTASSIUM SERPL-SCNC: 3.8 MMOL/L (ref 3.5–5.1)
PROT SERPL-MCNC: 8 G/DL (ref 6.4–8.2)
RBC # BLD AUTO: 5 M/UL (ref 3.8–5.2)
SODIUM SERPL-SCNC: 140 MMOL/L (ref 136–145)
WBC # BLD AUTO: 6.8 K/UL (ref 3.6–11)

## 2024-09-30 PROCEDURE — 3074F SYST BP LT 130 MM HG: CPT | Performed by: INTERNAL MEDICINE

## 2024-09-30 PROCEDURE — G8419 CALC BMI OUT NRM PARAM NOF/U: HCPCS | Performed by: INTERNAL MEDICINE

## 2024-09-30 PROCEDURE — G0439 PPPS, SUBSEQ VISIT: HCPCS | Performed by: INTERNAL MEDICINE

## 2024-09-30 PROCEDURE — 3079F DIAST BP 80-89 MM HG: CPT | Performed by: INTERNAL MEDICINE

## 2024-09-30 PROCEDURE — 1036F TOBACCO NON-USER: CPT | Performed by: INTERNAL MEDICINE

## 2024-09-30 PROCEDURE — 1090F PRES/ABSN URINE INCON ASSESS: CPT | Performed by: INTERNAL MEDICINE

## 2024-09-30 PROCEDURE — 3044F HG A1C LEVEL LT 7.0%: CPT | Performed by: INTERNAL MEDICINE

## 2024-09-30 PROCEDURE — G8427 DOCREV CUR MEDS BY ELIG CLIN: HCPCS | Performed by: INTERNAL MEDICINE

## 2024-09-30 PROCEDURE — G8399 PT W/DXA RESULTS DOCUMENT: HCPCS | Performed by: INTERNAL MEDICINE

## 2024-09-30 PROCEDURE — 99214 OFFICE O/P EST MOD 30 MIN: CPT | Performed by: INTERNAL MEDICINE

## 2024-09-30 PROCEDURE — 1123F ACP DISCUSS/DSCN MKR DOCD: CPT | Performed by: INTERNAL MEDICINE

## 2024-09-30 ASSESSMENT — PATIENT HEALTH QUESTIONNAIRE - PHQ9
2. FEELING DOWN, DEPRESSED OR HOPELESS: NOT AT ALL
SUM OF ALL RESPONSES TO PHQ QUESTIONS 1-9: 0
SUM OF ALL RESPONSES TO PHQ9 QUESTIONS 1 & 2: 0
SUM OF ALL RESPONSES TO PHQ QUESTIONS 1-9: 0
1. LITTLE INTEREST OR PLEASURE IN DOING THINGS: NOT AT ALL

## 2024-09-30 ASSESSMENT — LIFESTYLE VARIABLES
HOW OFTEN DO YOU HAVE A DRINK CONTAINING ALCOHOL: NEVER
HOW MANY STANDARD DRINKS CONTAINING ALCOHOL DO YOU HAVE ON A TYPICAL DAY: PATIENT DOES NOT DRINK

## 2024-09-30 NOTE — TELEPHONE ENCOUNTER
Referral to pain management and last office note sent to pain management,.     Earline \"Lui\" ALETA JuniorN

## 2024-09-30 NOTE — TELEPHONE ENCOUNTER
Patient referred to Pain management Dr. Gomez. Dr. Gomez office told the patient that they need a referral and last office notes, labs, imaging before scheduling an appointment.  Please contact patient for more information.

## 2024-09-30 NOTE — PROGRESS NOTES
minutes per day, four days per week. Denies any significant decrease in hearing.  Has had no falls in the last year. She is independent with ADLs, and iADLs, and feels safe at home.    She was more dizzy after taking duloxetine, so she stopped the medication. Her joint pain has been bad still--did not improve with a steroid injection per ortho, and PT is too expensive for her. She has been doing home exercises on her own. She has low back pain radiating to her buttocks and thighs. She has been taking Aleve muscle. The following problems were reviewed today and where indicated follow up appointments were made and/or referrals ordered. Her diet has been eating well, some sweets. Ms. Mcrae denies exertional chest pain, dyspnea on exertion.      Positive Risk Factor Screenings with Interventions:               General HRA Questions:  Select all that apply: (!) New or Increased Pain, New or Increased Fatigue  Interventions - Pain:  See AVS for additional education material  Interventions Fatigue:  See AVS for additional education material            Safety:  Do you have non-slip mats or non-slip surfaces or shower bars or grab bars in your shower or bathtub?: (!) No  Interventions:  See AVS for additional education material                   Objective   Vitals:    09/30/24 1120   BP: 129/84   Site: Left Upper Arm   Position: Sitting   Cuff Size: Large Adult   Pulse: 72   Resp: 14   SpO2: 97%   Weight: 75.7 kg (166 lb 12.8 oz)   Height: 1.626 m (5' 4\")      Body mass index is 28.63 kg/m².          General: well nourished, well appearing, in no distress  Heart: regular, normal rate, no murmurs noted, distal pulses 2+ bilaterally, no pitting peripheral edema  Lungs: good air movement, clear to auscultation bilaterally, no wheezing or rales noted   Psych: full, appropriate affect, normal speech PHQ2-score: 0  Neuro: alert, oriented x 3               Allergies   Allergen Reactions    Atorvastatin      Muscle pain and fatigue

## 2024-09-30 NOTE — PATIENT INSTRUCTIONS
I recommend you have updated flu and COVID vaccines this fall, at the pharmacy.  You should consider the RSV vaccine.    Try contacting Dr. Danyel Gomez at [facility]: [phone]     Please bring in a copy of your advanced medical directive/living will so we and the hospital will have it on file.     If you have pain, I recommend you use acetaminophen (Tylenol). You should avoid using ibuprofen, naproxen, Advil, Aleve, Motrin as these medications will worsen your kidney function.           Fatigue: Care Instructions  Overview     Fatigue is a feeling of tiredness, exhaustion, or lack of energy. You may feel fatigue because of too much or not enough activity. It can also come from stress, lack of sleep, boredom, and poor diet. Many medical problems, such as viral infections, can cause fatigue. Emotional problems, especially depression, are often the cause of fatigue.  Fatigue is most often a symptom of another problem. Treatment for fatigue depends on the cause. For example, if you have fatigue because you have a certain health problem, treating this problem also treats your fatigue. If depression or anxiety is the cause, treatment may help.  Follow-up care is a key part of your treatment and safety. Be sure to make and go to all appointments, and call your doctor if you are having problems. It's also a good idea to know your test results and keep a list of the medicines you take.  How can you care for yourself at home?  Get regular exercise. But try not to overdo it. It may help to go back and forth between rest and exercise.  Get plenty of rest.  Eat a variety of healthy foods. Try not to skip any meals.  Avoid or try to cut back on your use of caffeine, tobacco, and alcohol. Caffeine is most often found in coffee, tea, cola drinks, and energy drinks.  Limit medicines that can cause fatigue. These include medicines such as cold and allergy medicines.  When should you call for help?  Watch closely for changes

## 2024-10-07 ENCOUNTER — TRANSCRIBE ORDERS (OUTPATIENT)
Facility: HOSPITAL | Age: 74
End: 2024-10-07

## 2024-10-07 DIAGNOSIS — M48.062 SPINAL STENOSIS, LUMBAR REGION, WITH NEUROGENIC CLAUDICATION: Primary | ICD-10-CM

## 2025-01-15 ENCOUNTER — TRANSCRIBE ORDERS (OUTPATIENT)
Facility: HOSPITAL | Age: 75
End: 2025-01-15

## 2025-01-15 DIAGNOSIS — Z12.31 VISIT FOR SCREENING MAMMOGRAM: ICD-10-CM

## 2025-02-03 ENCOUNTER — HOSPITAL ENCOUNTER (OUTPATIENT)
Facility: HOSPITAL | Age: 75
Discharge: HOME | End: 2025-02-06
Attending: INTERNAL MEDICINE
Payer: MEDICARE

## 2025-02-03 VITALS — WEIGHT: 166 LBS | BODY MASS INDEX: 28.49 KG/M2

## 2025-02-03 DIAGNOSIS — Z12.31 VISIT FOR SCREENING MAMMOGRAM: ICD-10-CM

## 2025-02-03 PROCEDURE — 77063 BREAST TOMOSYNTHESIS BI: CPT

## 2025-03-26 ENCOUNTER — TELEPHONE (OUTPATIENT)
Facility: CLINIC | Age: 75
End: 2025-03-26

## 2025-03-26 NOTE — TELEPHONE ENCOUNTER
Attempted to contact patient regarding upcoming Medicare wellness appointment and completion of HRA questionnaire. LVM for patient to please return call at  [phone]

## 2025-03-27 ENCOUNTER — TELEPHONE (OUTPATIENT)
Facility: CLINIC | Age: 75
End: 2025-03-27

## 2025-03-27 SDOH — HEALTH STABILITY: PHYSICAL HEALTH: ON AVERAGE, HOW MANY MINUTES DO YOU ENGAGE IN EXERCISE AT THIS LEVEL?: 30 MIN

## 2025-03-27 SDOH — HEALTH STABILITY: PHYSICAL HEALTH: ON AVERAGE, HOW MANY DAYS PER WEEK DO YOU ENGAGE IN MODERATE TO STRENUOUS EXERCISE (LIKE A BRISK WALK)?: 4 DAYS

## 2025-03-27 ASSESSMENT — LIFESTYLE VARIABLES
HOW OFTEN DO YOU HAVE A DRINK CONTAINING ALCOHOL: NEVER
HOW OFTEN DO YOU HAVE A DRINK CONTAINING ALCOHOL: 1
HOW MANY STANDARD DRINKS CONTAINING ALCOHOL DO YOU HAVE ON A TYPICAL DAY: 0
HOW MANY STANDARD DRINKS CONTAINING ALCOHOL DO YOU HAVE ON A TYPICAL DAY: PATIENT DOES NOT DRINK
HOW OFTEN DO YOU HAVE SIX OR MORE DRINKS ON ONE OCCASION: 1

## 2025-03-27 ASSESSMENT — PATIENT HEALTH QUESTIONNAIRE - PHQ9
2. FEELING DOWN, DEPRESSED OR HOPELESS: NOT AT ALL
SUM OF ALL RESPONSES TO PHQ QUESTIONS 1-9: 0
1. LITTLE INTEREST OR PLEASURE IN DOING THINGS: NOT AT ALL

## 2025-03-30 SDOH — ECONOMIC STABILITY: INCOME INSECURITY: IN THE LAST 12 MONTHS, WAS THERE A TIME WHEN YOU WERE NOT ABLE TO PAY THE MORTGAGE OR RENT ON TIME?: NO

## 2025-03-30 SDOH — ECONOMIC STABILITY: TRANSPORTATION INSECURITY
IN THE PAST 12 MONTHS, HAS THE LACK OF TRANSPORTATION KEPT YOU FROM MEDICAL APPOINTMENTS OR FROM GETTING MEDICATIONS?: NO

## 2025-03-30 SDOH — ECONOMIC STABILITY: FOOD INSECURITY: WITHIN THE PAST 12 MONTHS, YOU WORRIED THAT YOUR FOOD WOULD RUN OUT BEFORE YOU GOT MONEY TO BUY MORE.: PATIENT DECLINED

## 2025-03-30 SDOH — ECONOMIC STABILITY: TRANSPORTATION INSECURITY
IN THE PAST 12 MONTHS, HAS LACK OF TRANSPORTATION KEPT YOU FROM MEETINGS, WORK, OR FROM GETTING THINGS NEEDED FOR DAILY LIVING?: NO

## 2025-03-30 SDOH — ECONOMIC STABILITY: FOOD INSECURITY: WITHIN THE PAST 12 MONTHS, THE FOOD YOU BOUGHT JUST DIDN'T LAST AND YOU DIDN'T HAVE MONEY TO GET MORE.: PATIENT DECLINED

## 2025-03-31 ENCOUNTER — OFFICE VISIT (OUTPATIENT)
Facility: CLINIC | Age: 75
End: 2025-03-31
Payer: MEDICARE

## 2025-03-31 VITALS
OXYGEN SATURATION: 98 % | WEIGHT: 166.6 LBS | HEIGHT: 64 IN | SYSTOLIC BLOOD PRESSURE: 117 MMHG | HEART RATE: 82 BPM | DIASTOLIC BLOOD PRESSURE: 74 MMHG | BODY MASS INDEX: 28.44 KG/M2

## 2025-03-31 DIAGNOSIS — I10 PRIMARY HYPERTENSION: ICD-10-CM

## 2025-03-31 DIAGNOSIS — E78.49 OTHER HYPERLIPIDEMIA: ICD-10-CM

## 2025-03-31 DIAGNOSIS — E11.22 TYPE 2 DIABETES MELLITUS WITH STAGE 3A CHRONIC KIDNEY DISEASE, WITHOUT LONG-TERM CURRENT USE OF INSULIN (HCC): Primary | ICD-10-CM

## 2025-03-31 DIAGNOSIS — N18.31 TYPE 2 DIABETES MELLITUS WITH STAGE 3A CHRONIC KIDNEY DISEASE, WITHOUT LONG-TERM CURRENT USE OF INSULIN (HCC): ICD-10-CM

## 2025-03-31 DIAGNOSIS — N18.31 TYPE 2 DIABETES MELLITUS WITH STAGE 3A CHRONIC KIDNEY DISEASE, WITHOUT LONG-TERM CURRENT USE OF INSULIN (HCC): Primary | ICD-10-CM

## 2025-03-31 DIAGNOSIS — E11.22 TYPE 2 DIABETES MELLITUS WITH STAGE 3A CHRONIC KIDNEY DISEASE, WITHOUT LONG-TERM CURRENT USE OF INSULIN (HCC): ICD-10-CM

## 2025-03-31 DIAGNOSIS — N18.31 STAGE 3A CHRONIC KIDNEY DISEASE (HCC): ICD-10-CM

## 2025-03-31 DIAGNOSIS — D32.9 MENINGIOMA (HCC): ICD-10-CM

## 2025-03-31 LAB
ANION GAP SERPL CALC-SCNC: 5 MMOL/L (ref 2–12)
BUN SERPL-MCNC: 22 MG/DL (ref 6–20)
BUN/CREAT SERPL: 23 (ref 12–20)
CALCIUM SERPL-MCNC: 10.2 MG/DL (ref 8.5–10.1)
CHLORIDE SERPL-SCNC: 106 MMOL/L (ref 97–108)
CHOLEST SERPL-MCNC: 243 MG/DL
CO2 SERPL-SCNC: 26 MMOL/L (ref 21–32)
CREAT SERPL-MCNC: 0.95 MG/DL (ref 0.55–1.02)
CREAT UR-MCNC: 162 MG/DL
EST. AVERAGE GLUCOSE BLD GHB EST-MCNC: 143 MG/DL
GLUCOSE SERPL-MCNC: 104 MG/DL (ref 65–100)
HBA1C MFR BLD: 6.6 % (ref 4–5.6)
HDLC SERPL-MCNC: 61 MG/DL
HDLC SERPL: 4 (ref 0–5)
LDLC SERPL CALC-MCNC: 150.8 MG/DL (ref 0–100)
MICROALBUMIN UR-MCNC: 1.1 MG/DL
MICROALBUMIN/CREAT UR-RTO: 7 MG/G (ref 0–30)
POTASSIUM SERPL-SCNC: 3.4 MMOL/L (ref 3.5–5.1)
SODIUM SERPL-SCNC: 137 MMOL/L (ref 136–145)
TRIGL SERPL-MCNC: 156 MG/DL
VLDLC SERPL CALC-MCNC: 31.2 MG/DL

## 2025-03-31 PROCEDURE — 1159F MED LIST DOCD IN RCRD: CPT | Performed by: INTERNAL MEDICINE

## 2025-03-31 PROCEDURE — 1036F TOBACCO NON-USER: CPT | Performed by: INTERNAL MEDICINE

## 2025-03-31 PROCEDURE — 1160F RVW MEDS BY RX/DR IN RCRD: CPT | Performed by: INTERNAL MEDICINE

## 2025-03-31 PROCEDURE — 1125F AMNT PAIN NOTED PAIN PRSNT: CPT | Performed by: INTERNAL MEDICINE

## 2025-03-31 PROCEDURE — G8419 CALC BMI OUT NRM PARAM NOF/U: HCPCS | Performed by: INTERNAL MEDICINE

## 2025-03-31 PROCEDURE — 1090F PRES/ABSN URINE INCON ASSESS: CPT | Performed by: INTERNAL MEDICINE

## 2025-03-31 PROCEDURE — 99214 OFFICE O/P EST MOD 30 MIN: CPT | Performed by: INTERNAL MEDICINE

## 2025-03-31 PROCEDURE — 3074F SYST BP LT 130 MM HG: CPT | Performed by: INTERNAL MEDICINE

## 2025-03-31 PROCEDURE — 1123F ACP DISCUSS/DSCN MKR DOCD: CPT | Performed by: INTERNAL MEDICINE

## 2025-03-31 PROCEDURE — 3078F DIAST BP <80 MM HG: CPT | Performed by: INTERNAL MEDICINE

## 2025-03-31 PROCEDURE — G8427 DOCREV CUR MEDS BY ELIG CLIN: HCPCS | Performed by: INTERNAL MEDICINE

## 2025-03-31 PROCEDURE — G8399 PT W/DXA RESULTS DOCUMENT: HCPCS | Performed by: INTERNAL MEDICINE

## 2025-03-31 NOTE — PROGRESS NOTES
A/P:          ICD-10-CM    1. Type 2 diabetes mellitus with stage 3a chronic kidney disease, without long-term current use of insulin (HCC)  E11.22 Basic Metabolic Panel    N18.31 Hemoglobin A1C     Lipid Panel     Albumin/Creatinine Ratio, Urine      2. Primary hypertension  I10 Basic Metabolic Panel     Lipid Panel      3. Other hyperlipidemia  E78.49 Lipid Panel      4. Stage 3a chronic kidney disease (HCC)  N18.31 Basic Metabolic Panel     Albumin/Creatinine Ratio, Urine      5. Meningioma (HCC)  D32.9            Assessment & Plan  1.  Hyperlipidemia  -Treated so far with lifestyle modification.  She will have lipids with labs today.    2. Hypertension.  - Blood pressure is well-controlled with current medication regimen.  -Continue current medications, pending labs.    3. Diabetes mellitus.  - Last recorded A1c level was 6.4.  - Advised to maintain current diet and medication regimen to keep A1c levels stable.  - Blood work will be done today to check current A1c and cholesterol levels.  - No significant changes in diet reported, but advised to monitor sugar intake.    4. Chronic kidney disease.  - Kidney function has been stable for the past 3 years with a slight decrease but no significant deterioration, EGFR is around 51.  - Advised to avoid NSAIDs to prevent further kidney damage.  - Blood and urine test will be conducted today to monitor kidney function.  - Continues to use Tylenol for pain management.    5. Meningioma.  - MRI in 12/2024 showed no changes.  - Dr. Robin will continue to monitor the condition with periodic imaging as needed.  - No new symptoms or concerns reported related to meningioma.         Follow up:  6 months for AWV          An electronic signature was used to authenticate this note.    --Lillian Krueger MD         HPI: Kylee Mcrae is here for a follow up visit:      History of Present Illness  The patient presents for a regular follow-up of blood pressure, diabetes, and

## 2025-03-31 NOTE — PATIENT INSTRUCTIONS
If you have pain, I recommend you use acetaminophen (Tylenol). You should avoid using ibuprofen, naproxen, Advil, Aleve, Motrin as these medications will worsen your kidney function.

## 2025-04-01 ENCOUNTER — RESULTS FOLLOW-UP (OUTPATIENT)
Facility: CLINIC | Age: 75
End: 2025-04-01

## 2025-04-15 ENCOUNTER — TELEPHONE (OUTPATIENT)
Facility: CLINIC | Age: 75
End: 2025-04-15

## 2025-04-15 RX ORDER — ROSUVASTATIN CALCIUM 5 MG/1
5 TABLET, COATED ORAL
Qty: 8 TABLET | Refills: 5 | Status: SHIPPED | OUTPATIENT
Start: 2025-04-17

## 2025-04-15 NOTE — TELEPHONE ENCOUNTER
Outgoing call to pt. Pt name and  verified. Pt made aware per Dr. Krueger:     \"Ms. Mcrae,      Your labs look okay--blood sugar has been higher, and A1C is up to 6.6%. I recommend you follow a lower sugar diet and exercise most days to keep this from going up higher, and requiring medication.      Speaking of medication, I would recommend you try a low dose statin, twice a week to see if you can tolerate it. Your cholesterol is too high for someone with diabetes.  I would recommend rosuvastatin 5 mg twice a week.  Are you willing to try this?     Your kidney function tests, electrolytes, urine sample all look good, overall. Let me know you think about the statin.\"    Pt stated she understood. Educational information was given to pt on what food were causing her sugar to go up and what foods were high in cholesterol. We discussed what foods to stay away from and what foods to eat more of. Pt stated she was rx'd a statin years ago but it made her legs hurt and gave her vasyl horses at night. Even with that, pt stated she would try the medication Dr. Krueger is recommended. Pt made aware to take it 2x a week (Monday and Thursday). Pt requested medication sent to [facility] on [address].

## 2025-04-15 NOTE — TELEPHONE ENCOUNTER
Please call--patient did not read the [employer] message below:     Ms. Mcrae,      Your labs look okay--blood sugar has been higher, and A1C is up to 6.6%. I recommend you follow a lower sugar diet and exercise most days to keep this from going up higher, and requiring medication.      Speaking of medication, I would recommend you try a low dose statin, twice a week to see if you can tolerate it. Your cholesterol is too high for someone with diabetes.  I would recommend rosuvastatin 5 mg twice a week.  Are you willing to try this?     Your kidney function tests, electrolytes, urine sample all look good, overall. Let me know you think about the statin.     Take care,   Dr. Krueger

## 2025-08-07 RX ORDER — ROSUVASTATIN CALCIUM 5 MG/1
5 TABLET, COATED ORAL
Qty: 24 TABLET | OUTPATIENT
Start: 2025-08-07

## 2025-08-26 ENCOUNTER — TELEPHONE (OUTPATIENT)
Facility: CLINIC | Age: 75
End: 2025-08-26

## 2025-08-27 RX ORDER — PANTOPRAZOLE SODIUM 40 MG/1
40 TABLET, DELAYED RELEASE ORAL DAILY PRN
Qty: 30 TABLET | Refills: 2 | Status: SHIPPED | OUTPATIENT
Start: 2025-08-27

## (undated) DEVICE — CATH IV AUTOGRD BC PNK 20GA 25 -- INSYTE

## (undated) DEVICE — SET ADMIN 16ML TBNG L100IN 2 Y INJ SITE IV PIGGY BK DISP

## (undated) DEVICE — CANN NASAL O2 CAPNOGRAPHY AD -- FILTERLINE

## (undated) DEVICE — KENDALL RADIOLUCENT FOAM MONITORING ELECTRODE -RECTANGULAR SHAPE: Brand: KENDALL

## (undated) DEVICE — 1200 GUARD II KIT W/5MM TUBE W/O VAC TUBE: Brand: GUARDIAN

## (undated) DEVICE — Device

## (undated) DEVICE — SIMPLICITY FLUFF UNDERPAD 23X36, MODERATE: Brand: SIMPLICITY

## (undated) DEVICE — SOLIDIFIER MEDC 1200ML -- CONVERT TO 356117

## (undated) DEVICE — KIT COLON W/ 1.1OZ LUB AND 2 END

## (undated) DEVICE — BAG BELONG PT PERS CLEAR HANDL

## (undated) DEVICE — (D)SENSOR RMFG 02 PULS OXMTR -- DISC BY MFR USE ITEM 133445

## (undated) DEVICE — CUFF ADULT 1 PC 1 VINYL DISP --